# Patient Record
Sex: MALE | Race: WHITE | Employment: OTHER | URBAN - METROPOLITAN AREA
[De-identification: names, ages, dates, MRNs, and addresses within clinical notes are randomized per-mention and may not be internally consistent; named-entity substitution may affect disease eponyms.]

---

## 2017-01-16 ENCOUNTER — APPOINTMENT (OUTPATIENT)
Dept: GENERAL RADIOLOGY | Age: 77
DRG: 069 | End: 2017-01-16
Payer: MEDICARE

## 2017-01-16 ENCOUNTER — APPOINTMENT (OUTPATIENT)
Dept: ULTRASOUND IMAGING | Age: 77
DRG: 069 | End: 2017-01-16
Payer: MEDICARE

## 2017-01-16 ENCOUNTER — HOSPITAL ENCOUNTER (OUTPATIENT)
Age: 77
Setting detail: OBSERVATION
Discharge: HOME | DRG: 069 | End: 2017-01-18
Attending: STUDENT IN AN ORGANIZED HEALTH CARE EDUCATION/TRAINING PROGRAM | Admitting: INTERNAL MEDICINE
Payer: MEDICARE

## 2017-01-16 ENCOUNTER — APPOINTMENT (OUTPATIENT)
Dept: CT IMAGING | Age: 77
DRG: 069 | End: 2017-01-16
Payer: MEDICARE

## 2017-01-16 DIAGNOSIS — G45.9 TRANSIENT CEREBRAL ISCHEMIA, UNSPECIFIED TYPE: ICD-10-CM

## 2017-01-16 DIAGNOSIS — R41.0 DISORIENTATION: Primary | ICD-10-CM

## 2017-01-16 LAB
ALBUMIN SERPL-MCNC: 4.1 G/DL (ref 3.9–4.9)
ALP BLD-CCNC: 83 U/L (ref 35–104)
ALT SERPL-CCNC: 21 U/L (ref 0–41)
AMMONIA: 40 UMOL/L (ref 16–60)
ANION GAP SERPL CALCULATED.3IONS-SCNC: 13 MEQ/L (ref 7–13)
APTT: 25.7 SEC (ref 21.6–35.4)
AST SERPL-CCNC: 20 U/L (ref 0–40)
BACTERIA: ABNORMAL /HPF
BASOPHILS ABSOLUTE: 0 K/UL (ref 0–0.2)
BASOPHILS RELATIVE PERCENT: 0 %
BILIRUB SERPL-MCNC: 0.9 MG/DL (ref 0–1.2)
BILIRUBIN URINE: NEGATIVE
BLOOD, URINE: ABNORMAL
BUN BLDV-MCNC: 19 MG/DL (ref 8–23)
C-REACTIVE PROTEIN, HIGH SENSITIVITY: 12.6 MG/L (ref 0–5)
CALCIUM SERPL-MCNC: 8.8 MG/DL (ref 8.6–10.2)
CHLORIDE BLD-SCNC: 97 MEQ/L (ref 98–107)
CHOLESTEROL, TOTAL: 78 MG/DL (ref 0–199)
CHP ED QC CHECK: YES
CLARITY: CLEAR
CO2: 28 MEQ/L (ref 22–29)
COLOR: YELLOW
CREAT SERPL-MCNC: 1.16 MG/DL (ref 0.7–1.2)
EOSINOPHILS ABSOLUTE: 0.1 K/UL (ref 0–0.7)
EOSINOPHILS RELATIVE PERCENT: 1.2 %
EPITHELIAL CELLS, UA: ABNORMAL /HPF
GFR AFRICAN AMERICAN: >60
GFR NON-AFRICAN AMERICAN: >60
GLOBULIN: 2.3 G/DL (ref 2.3–3.5)
GLUCOSE BLD-MCNC: 105 MG/DL (ref 60–115)
GLUCOSE BLD-MCNC: 113 MG/DL (ref 60–115)
GLUCOSE BLD-MCNC: 117 MG/DL (ref 74–109)
GLUCOSE BLD-MCNC: 119 MG/DL
GLUCOSE URINE: NEGATIVE MG/DL
HCT VFR BLD CALC: 43.4 % (ref 42–52)
HDLC SERPL-MCNC: 29 MG/DL (ref 40–59)
HEMOGLOBIN: 14.5 G/DL (ref 14–18)
INR BLD: 1.1
KETONES, URINE: NEGATIVE MG/DL
LACTIC ACID: 1 MMOL/L (ref 0.5–2.2)
LDL CHOLESTEROL CALCULATED: 35 MG/DL (ref 0–129)
LEUKOCYTE ESTERASE, URINE: NEGATIVE
LYMPHOCYTES ABSOLUTE: 0.2 K/UL (ref 1–4.8)
LYMPHOCYTES RELATIVE PERCENT: 3.4 %
MAGNESIUM: 2.1 MG/DL (ref 1.7–2.3)
MCH RBC QN AUTO: 32.2 PG (ref 27–31.3)
MCHC RBC AUTO-ENTMCNC: 33.5 % (ref 33–37)
MCV RBC AUTO: 96 FL (ref 80–100)
MONOCYTES ABSOLUTE: 0.5 K/UL (ref 0.2–0.8)
MONOCYTES RELATIVE PERCENT: 6.7 %
NEUTROPHILS ABSOLUTE: 6.3 K/UL (ref 1.4–6.5)
NEUTROPHILS RELATIVE PERCENT: 88.7 %
NITRITE, URINE: NEGATIVE
PDW BLD-RTO: 14 % (ref 11.5–14.5)
PERFORMED ON: NORMAL
PERFORMED ON: NORMAL
PH UA: 6.5 (ref 5–9)
PLATELET # BLD: 144 K/UL (ref 130–400)
POTASSIUM SERPL-SCNC: 4.2 MEQ/L (ref 3.5–5.1)
PROTEIN UA: 30 MG/DL
PROTHROMBIN TIME: 11.6 SEC (ref 9.6–12.3)
RBC # BLD: 4.52 M/UL (ref 4.7–6.1)
RBC UA: ABNORMAL /HPF (ref 0–2)
SODIUM BLD-SCNC: 138 MEQ/L (ref 132–144)
SPECIFIC GRAVITY UA: 1.02 (ref 1–1.03)
TOTAL CK: 98 U/L (ref 0–190)
TOTAL PROTEIN: 6.4 G/DL (ref 6.4–8.1)
TRIGL SERPL-MCNC: 68 MG/DL (ref 0–200)
TROPONIN: <0.01 NG/ML (ref 0–0.01)
TSH SERPL DL<=0.05 MIU/L-ACNC: 1.09 UIU/ML (ref 0.27–4.2)
URINE REFLEX TO CULTURE: YES
UROBILINOGEN, URINE: 0.2 E.U./DL
WBC # BLD: 7.1 K/UL (ref 4.8–10.8)
WBC UA: ABNORMAL /HPF (ref 0–5)

## 2017-01-16 PROCEDURE — 1210000000 HC MED SURG R&B

## 2017-01-16 PROCEDURE — 80053 COMPREHEN METABOLIC PANEL: CPT

## 2017-01-16 PROCEDURE — 6370000000 HC RX 637 (ALT 250 FOR IP): Performed by: STUDENT IN AN ORGANIZED HEALTH CARE EDUCATION/TRAINING PROGRAM

## 2017-01-16 PROCEDURE — G0378 HOSPITAL OBSERVATION PER HR: HCPCS

## 2017-01-16 PROCEDURE — 86141 C-REACTIVE PROTEIN HS: CPT

## 2017-01-16 PROCEDURE — 81001 URINALYSIS AUTO W/SCOPE: CPT

## 2017-01-16 PROCEDURE — 71010 XR CHEST PORTABLE: CPT

## 2017-01-16 PROCEDURE — 36415 COLL VENOUS BLD VENIPUNCTURE: CPT

## 2017-01-16 PROCEDURE — 6360000002 HC RX W HCPCS: Performed by: INTERNAL MEDICINE

## 2017-01-16 PROCEDURE — 93005 ELECTROCARDIOGRAM TRACING: CPT

## 2017-01-16 PROCEDURE — 87086 URINE CULTURE/COLONY COUNT: CPT

## 2017-01-16 PROCEDURE — 83735 ASSAY OF MAGNESIUM: CPT

## 2017-01-16 PROCEDURE — 93880 EXTRACRANIAL BILAT STUDY: CPT

## 2017-01-16 PROCEDURE — 82140 ASSAY OF AMMONIA: CPT

## 2017-01-16 PROCEDURE — 6370000000 HC RX 637 (ALT 250 FOR IP): Performed by: INTERNAL MEDICINE

## 2017-01-16 PROCEDURE — 96372 THER/PROPH/DIAG INJ SC/IM: CPT

## 2017-01-16 PROCEDURE — 99285 EMERGENCY DEPT VISIT HI MDM: CPT

## 2017-01-16 PROCEDURE — 87040 BLOOD CULTURE FOR BACTERIA: CPT

## 2017-01-16 PROCEDURE — 70450 CT HEAD/BRAIN W/O DYE: CPT

## 2017-01-16 PROCEDURE — 84443 ASSAY THYROID STIM HORMONE: CPT

## 2017-01-16 PROCEDURE — 83605 ASSAY OF LACTIC ACID: CPT

## 2017-01-16 PROCEDURE — 82550 ASSAY OF CK (CPK): CPT

## 2017-01-16 PROCEDURE — 84484 ASSAY OF TROPONIN QUANT: CPT

## 2017-01-16 PROCEDURE — 80061 LIPID PANEL: CPT

## 2017-01-16 PROCEDURE — 85025 COMPLETE CBC W/AUTO DIFF WBC: CPT

## 2017-01-16 PROCEDURE — 85730 THROMBOPLASTIN TIME PARTIAL: CPT

## 2017-01-16 PROCEDURE — 85610 PROTHROMBIN TIME: CPT

## 2017-01-16 PROCEDURE — 2580000003 HC RX 258: Performed by: INTERNAL MEDICINE

## 2017-01-16 RX ORDER — ATORVASTATIN CALCIUM 10 MG/1
10 TABLET, FILM COATED ORAL DAILY
Status: DISCONTINUED | OUTPATIENT
Start: 2017-01-16 | End: 2017-01-18 | Stop reason: HOSPADM

## 2017-01-16 RX ORDER — DOCUSATE SODIUM 100 MG/1
100 CAPSULE, LIQUID FILLED ORAL 2 TIMES DAILY
Status: DISCONTINUED | OUTPATIENT
Start: 2017-01-16 | End: 2017-01-18 | Stop reason: HOSPADM

## 2017-01-16 RX ORDER — DONEPEZIL HYDROCHLORIDE 5 MG/1
10 TABLET, FILM COATED ORAL NIGHTLY
Status: DISCONTINUED | OUTPATIENT
Start: 2017-01-16 | End: 2017-01-18 | Stop reason: HOSPADM

## 2017-01-16 RX ORDER — MESALAMINE 400 MG/1
400 CAPSULE, DELAYED RELEASE ORAL 3 TIMES DAILY
Status: DISCONTINUED | OUTPATIENT
Start: 2017-01-16 | End: 2017-01-18 | Stop reason: HOSPADM

## 2017-01-16 RX ORDER — ASPIRIN 81 MG/1
324 TABLET, CHEWABLE ORAL ONCE
Status: COMPLETED | OUTPATIENT
Start: 2017-01-16 | End: 2017-01-16

## 2017-01-16 RX ORDER — SODIUM CHLORIDE 0.9 % (FLUSH) 0.9 %
10 SYRINGE (ML) INJECTION EVERY 12 HOURS SCHEDULED
Status: DISCONTINUED | OUTPATIENT
Start: 2017-01-16 | End: 2017-01-18 | Stop reason: HOSPADM

## 2017-01-16 RX ORDER — PROPRANOLOL HYDROCHLORIDE 40 MG/1
80 TABLET ORAL 2 TIMES DAILY
Status: DISCONTINUED | OUTPATIENT
Start: 2017-01-16 | End: 2017-01-18 | Stop reason: HOSPADM

## 2017-01-16 RX ORDER — SODIUM CHLORIDE 0.9 % (FLUSH) 0.9 %
10 SYRINGE (ML) INJECTION PRN
Status: DISCONTINUED | OUTPATIENT
Start: 2017-01-16 | End: 2017-01-18 | Stop reason: HOSPADM

## 2017-01-16 RX ORDER — AMLODIPINE BESYLATE AND BENAZEPRIL HYDROCHLORIDE 5; 20 MG/1; MG/1
1 CAPSULE ORAL 2 TIMES DAILY
Status: DISCONTINUED | OUTPATIENT
Start: 2017-01-16 | End: 2017-01-16 | Stop reason: CLARIF

## 2017-01-16 RX ORDER — ACETAMINOPHEN 325 MG/1
650 TABLET ORAL EVERY 4 HOURS PRN
Status: DISCONTINUED | OUTPATIENT
Start: 2017-01-16 | End: 2017-01-18 | Stop reason: HOSPADM

## 2017-01-16 RX ORDER — AMLODIPINE BESYLATE 5 MG/1
5 TABLET ORAL 2 TIMES DAILY
Status: DISCONTINUED | OUTPATIENT
Start: 2017-01-16 | End: 2017-01-18 | Stop reason: HOSPADM

## 2017-01-16 RX ORDER — CLONAZEPAM 0.5 MG/1
0.5 TABLET ORAL NIGHTLY PRN
Status: DISCONTINUED | OUTPATIENT
Start: 2017-01-16 | End: 2017-01-18 | Stop reason: HOSPADM

## 2017-01-16 RX ORDER — MEMANTINE HYDROCHLORIDE 5 MG/1
10 TABLET ORAL DAILY
Status: DISCONTINUED | OUTPATIENT
Start: 2017-01-16 | End: 2017-01-18 | Stop reason: HOSPADM

## 2017-01-16 RX ORDER — POTASSIUM CHLORIDE 20 MEQ/1
20 TABLET, EXTENDED RELEASE ORAL DAILY
Status: DISCONTINUED | OUTPATIENT
Start: 2017-01-16 | End: 2017-01-18 | Stop reason: HOSPADM

## 2017-01-16 RX ORDER — MULTIVITAMIN WITH FOLIC ACID 400 MCG
1 TABLET ORAL DAILY
Status: DISCONTINUED | OUTPATIENT
Start: 2017-01-16 | End: 2017-01-18 | Stop reason: HOSPADM

## 2017-01-16 RX ORDER — ASPIRIN 81 MG/1
81 TABLET ORAL DAILY
Status: DISCONTINUED | OUTPATIENT
Start: 2017-01-16 | End: 2017-01-18 | Stop reason: HOSPADM

## 2017-01-16 RX ORDER — LISINOPRIL 20 MG/1
20 TABLET ORAL 2 TIMES DAILY
Status: DISCONTINUED | OUTPATIENT
Start: 2017-01-16 | End: 2017-01-18 | Stop reason: HOSPADM

## 2017-01-16 RX ORDER — ALBUTEROL SULFATE 90 UG/1
2 AEROSOL, METERED RESPIRATORY (INHALATION) 4 TIMES DAILY
Status: DISCONTINUED | OUTPATIENT
Start: 2017-01-16 | End: 2017-01-17

## 2017-01-16 RX ORDER — OXYBUTYNIN CHLORIDE 5 MG/1
10 TABLET, EXTENDED RELEASE ORAL DAILY
Status: DISCONTINUED | OUTPATIENT
Start: 2017-01-16 | End: 2017-01-18 | Stop reason: HOSPADM

## 2017-01-16 RX ORDER — ONDANSETRON 2 MG/ML
4 INJECTION INTRAMUSCULAR; INTRAVENOUS EVERY 6 HOURS PRN
Status: DISCONTINUED | OUTPATIENT
Start: 2017-01-16 | End: 2017-01-18 | Stop reason: HOSPADM

## 2017-01-16 RX ORDER — MESALAMINE 1.2 G/1
1200 TABLET, DELAYED RELEASE ORAL
COMMUNITY
End: 2018-03-02 | Stop reason: ALTCHOICE

## 2017-01-16 RX ORDER — LOVASTATIN 20 MG/1
20 TABLET ORAL DAILY
Status: DISCONTINUED | OUTPATIENT
Start: 2017-01-16 | End: 2017-01-16 | Stop reason: CLARIF

## 2017-01-16 RX ORDER — BISACODYL 10 MG
10 SUPPOSITORY, RECTAL RECTAL DAILY PRN
Status: DISCONTINUED | OUTPATIENT
Start: 2017-01-16 | End: 2017-01-18 | Stop reason: HOSPADM

## 2017-01-16 RX ORDER — MESALAMINE 1.2 G/1
1.2 TABLET, DELAYED RELEASE ORAL
Status: DISCONTINUED | OUTPATIENT
Start: 2017-01-17 | End: 2017-01-16 | Stop reason: CLARIF

## 2017-01-16 RX ADMIN — DOCUSATE SODIUM 100 MG: 100 CAPSULE, LIQUID FILLED ORAL at 20:44

## 2017-01-16 RX ADMIN — ATORVASTATIN CALCIUM 10 MG: 10 TABLET, FILM COATED ORAL at 20:44

## 2017-01-16 RX ADMIN — DONEPEZIL HYDROCHLORIDE 10 MG: 5 TABLET, FILM COATED ORAL at 20:44

## 2017-01-16 RX ADMIN — ASPIRIN 81 MG 324 MG: 81 TABLET ORAL at 15:01

## 2017-01-16 RX ADMIN — PROPRANOLOL HYDROCHLORIDE 80 MG: 40 TABLET ORAL at 20:44

## 2017-01-16 RX ADMIN — LISINOPRIL 20 MG: 20 TABLET ORAL at 20:44

## 2017-01-16 RX ADMIN — MESALAMINE 400 MG: 400 CAPSULE, DELAYED RELEASE ORAL at 20:46

## 2017-01-16 RX ADMIN — AMLODIPINE BESYLATE 5 MG: 5 TABLET ORAL at 20:44

## 2017-01-16 RX ADMIN — ENOXAPARIN SODIUM 40 MG: 40 INJECTION SUBCUTANEOUS at 20:45

## 2017-01-16 RX ADMIN — Medication 10 ML: at 20:56

## 2017-01-16 ASSESSMENT — ENCOUNTER SYMPTOMS
ABDOMINAL PAIN: 0
COUGH: 1
DIARRHEA: 0
VOMITING: 0
SHORTNESS OF BREATH: 0
PHOTOPHOBIA: 0
NAUSEA: 0

## 2017-01-16 ASSESSMENT — PAIN SCALES - GENERAL
PAINLEVEL_OUTOF10: 0
PAINLEVEL_OUTOF10: 0

## 2017-01-17 ENCOUNTER — APPOINTMENT (OUTPATIENT)
Dept: NON INVASIVE DIAGNOSTICS | Age: 77
End: 2017-01-17
Payer: MEDICARE

## 2017-01-17 ENCOUNTER — HOSPITAL ENCOUNTER (OUTPATIENT)
Dept: MRI IMAGING | Age: 77
Discharge: HOME | End: 2017-01-17
Payer: MEDICARE

## 2017-01-17 ENCOUNTER — APPOINTMENT (OUTPATIENT)
Dept: MRI IMAGING | Age: 77
End: 2017-01-17
Payer: MEDICARE

## 2017-01-17 LAB
ANION GAP SERPL CALCULATED.3IONS-SCNC: 13 MEQ/L (ref 7–13)
BUN BLDV-MCNC: 18 MG/DL (ref 8–23)
CALCIUM SERPL-MCNC: 8.3 MG/DL (ref 8.6–10.2)
CHLORIDE BLD-SCNC: 97 MEQ/L (ref 98–107)
CO2: 27 MEQ/L (ref 22–29)
CREAT SERPL-MCNC: 0.95 MG/DL (ref 0.7–1.2)
GFR AFRICAN AMERICAN: >60
GFR NON-AFRICAN AMERICAN: >60
GLUCOSE BLD-MCNC: 99 MG/DL (ref 74–109)
HCT VFR BLD CALC: 37.6 % (ref 42–52)
HEMOGLOBIN: 12.6 G/DL (ref 14–18)
MCH RBC QN AUTO: 32.2 PG (ref 27–31.3)
MCHC RBC AUTO-ENTMCNC: 33.4 % (ref 33–37)
MCV RBC AUTO: 96.5 FL (ref 80–100)
PDW BLD-RTO: 14.6 % (ref 11.5–14.5)
PLATELET # BLD: 120 K/UL (ref 130–400)
POTASSIUM SERPL-SCNC: 3.6 MEQ/L (ref 3.5–5.1)
RBC # BLD: 3.9 M/UL (ref 4.7–6.1)
SODIUM BLD-SCNC: 137 MEQ/L (ref 132–144)
WBC # BLD: 5.9 K/UL (ref 4.8–10.8)

## 2017-01-17 PROCEDURE — 6370000000 HC RX 637 (ALT 250 FOR IP): Performed by: INTERNAL MEDICINE

## 2017-01-17 PROCEDURE — 97166 OT EVAL MOD COMPLEX 45 MIN: CPT

## 2017-01-17 PROCEDURE — 93306 TTE W/DOPPLER COMPLETE: CPT

## 2017-01-17 PROCEDURE — 2580000003 HC RX 258: Performed by: INTERNAL MEDICINE

## 2017-01-17 PROCEDURE — 6360000004 HC RX CONTRAST MEDICATION: Performed by: RADIOLOGY

## 2017-01-17 PROCEDURE — G8987 SELF CARE CURRENT STATUS: HCPCS

## 2017-01-17 PROCEDURE — 94761 N-INVAS EAR/PLS OXIMETRY MLT: CPT

## 2017-01-17 PROCEDURE — 70553 MRI BRAIN STEM W/O & W/DYE: CPT

## 2017-01-17 PROCEDURE — 1210000000 HC MED SURG R&B

## 2017-01-17 PROCEDURE — 6370000000 HC RX 637 (ALT 250 FOR IP): Performed by: STUDENT IN AN ORGANIZED HEALTH CARE EDUCATION/TRAINING PROGRAM

## 2017-01-17 PROCEDURE — G8988 SELF CARE GOAL STATUS: HCPCS

## 2017-01-17 PROCEDURE — 6360000002 HC RX W HCPCS: Performed by: INTERNAL MEDICINE

## 2017-01-17 PROCEDURE — 80048 BASIC METABOLIC PNL TOTAL CA: CPT

## 2017-01-17 PROCEDURE — A9579 GAD-BASE MR CONTRAST NOS,1ML: HCPCS | Performed by: RADIOLOGY

## 2017-01-17 PROCEDURE — 85027 COMPLETE CBC AUTOMATED: CPT

## 2017-01-17 PROCEDURE — 36415 COLL VENOUS BLD VENIPUNCTURE: CPT

## 2017-01-17 PROCEDURE — G0378 HOSPITAL OBSERVATION PER HR: HCPCS

## 2017-01-17 PROCEDURE — 70544 MR ANGIOGRAPHY HEAD W/O DYE: CPT

## 2017-01-17 RX ORDER — TIZANIDINE 2 MG/1
4 TABLET ORAL NIGHTLY
Status: DISCONTINUED | OUTPATIENT
Start: 2017-01-17 | End: 2017-01-18 | Stop reason: HOSPADM

## 2017-01-17 RX ORDER — LORAZEPAM 1 MG/1
1 TABLET ORAL ONCE
Status: COMPLETED | OUTPATIENT
Start: 2017-01-17 | End: 2017-01-17

## 2017-01-17 RX ORDER — ALBUTEROL SULFATE 90 UG/1
2 AEROSOL, METERED RESPIRATORY (INHALATION) 4 TIMES DAILY
Status: DISCONTINUED | OUTPATIENT
Start: 2017-01-18 | End: 2017-01-18 | Stop reason: HOSPADM

## 2017-01-17 RX ORDER — SODIUM CHLORIDE 0.9 % (FLUSH) 0.9 %
10 SYRINGE (ML) INJECTION 2 TIMES DAILY
Status: DISCONTINUED | OUTPATIENT
Start: 2017-01-17 | End: 2017-01-20 | Stop reason: HOSPADM

## 2017-01-17 RX ORDER — PHENOBARBITAL SODIUM 65 MG/ML
130 INJECTION INTRAMUSCULAR ONCE
Status: DISCONTINUED | OUTPATIENT
Start: 2017-01-17 | End: 2017-01-17 | Stop reason: ALTCHOICE

## 2017-01-17 RX ORDER — PRIMIDONE 50 MG/1
50 TABLET ORAL 2 TIMES DAILY
Status: DISCONTINUED | OUTPATIENT
Start: 2017-01-17 | End: 2017-01-18 | Stop reason: HOSPADM

## 2017-01-17 RX ADMIN — POTASSIUM CHLORIDE 20 MEQ: 1500 TABLET, EXTENDED RELEASE ORAL at 08:44

## 2017-01-17 RX ADMIN — DONEPEZIL HYDROCHLORIDE 10 MG: 5 TABLET, FILM COATED ORAL at 22:33

## 2017-01-17 RX ADMIN — ASPIRIN 81 MG: 81 TABLET ORAL at 08:44

## 2017-01-17 RX ADMIN — MESALAMINE 400 MG: 400 CAPSULE, DELAYED RELEASE ORAL at 22:32

## 2017-01-17 RX ADMIN — OXYBUTYNIN CHLORIDE 10 MG: 5 TABLET, FILM COATED, EXTENDED RELEASE ORAL at 08:44

## 2017-01-17 RX ADMIN — PROPRANOLOL HYDROCHLORIDE 80 MG: 40 TABLET ORAL at 22:40

## 2017-01-17 RX ADMIN — LORAZEPAM 1 MG: 1 TABLET ORAL at 09:53

## 2017-01-17 RX ADMIN — LISINOPRIL 20 MG: 20 TABLET ORAL at 08:44

## 2017-01-17 RX ADMIN — MEMANTINE 10 MG: 5 TABLET ORAL at 08:44

## 2017-01-17 RX ADMIN — MESALAMINE 400 MG: 400 CAPSULE, DELAYED RELEASE ORAL at 08:44

## 2017-01-17 RX ADMIN — AMLODIPINE BESYLATE 5 MG: 5 TABLET ORAL at 08:44

## 2017-01-17 RX ADMIN — PROPRANOLOL HYDROCHLORIDE 80 MG: 40 TABLET ORAL at 08:45

## 2017-01-17 RX ADMIN — DOCUSATE SODIUM 100 MG: 100 CAPSULE, LIQUID FILLED ORAL at 22:33

## 2017-01-17 RX ADMIN — LISINOPRIL 20 MG: 20 TABLET ORAL at 22:33

## 2017-01-17 RX ADMIN — GADOVERSETAMIDE 15 ML: 0.5 INJECTION, SOLUTION INTRAVENOUS at 13:00

## 2017-01-17 RX ADMIN — DOCUSATE SODIUM 100 MG: 100 CAPSULE, LIQUID FILLED ORAL at 08:44

## 2017-01-17 RX ADMIN — Medication 10 ML: at 22:35

## 2017-01-17 RX ADMIN — MULTIVITAMIN TABLET 1 TABLET: TABLET at 08:44

## 2017-01-17 RX ADMIN — ATORVASTATIN CALCIUM 10 MG: 10 TABLET, FILM COATED ORAL at 08:44

## 2017-01-17 RX ADMIN — AMLODIPINE BESYLATE 5 MG: 5 TABLET ORAL at 22:33

## 2017-01-17 ASSESSMENT — PAIN SCALES - GENERAL: PAINLEVEL_OUTOF10: 5

## 2017-01-17 ASSESSMENT — PAIN DESCRIPTION - PAIN TYPE: TYPE: CHRONIC PAIN

## 2017-01-17 ASSESSMENT — PAIN DESCRIPTION - ORIENTATION: ORIENTATION: RIGHT

## 2017-01-17 ASSESSMENT — PAIN DESCRIPTION - LOCATION: LOCATION: ARM

## 2017-01-18 VITALS
BODY MASS INDEX: 27.62 KG/M2 | TEMPERATURE: 98.7 F | RESPIRATION RATE: 18 BRPM | WEIGHT: 176 LBS | HEART RATE: 62 BPM | SYSTOLIC BLOOD PRESSURE: 127 MMHG | HEIGHT: 67 IN | OXYGEN SATURATION: 96 % | DIASTOLIC BLOOD PRESSURE: 70 MMHG

## 2017-01-18 LAB
ALBUMIN SERPL-MCNC: 3.5 G/DL (ref 3.9–4.9)
ALP BLD-CCNC: 67 U/L (ref 35–104)
ALT SERPL-CCNC: 15 U/L (ref 0–41)
AMMONIA: 46 UMOL/L (ref 16–60)
AST SERPL-CCNC: 15 U/L (ref 0–40)
BILIRUB SERPL-MCNC: 1 MG/DL (ref 0–1.2)
BILIRUBIN DIRECT: 0.2 MG/DL (ref 0–0.3)
BILIRUBIN, INDIRECT: 0.8 MG/DL (ref 0–0.6)
C-REACTIVE PROTEIN, HIGH SENSITIVITY: 106.7 MG/L (ref 0–5)
FOLATE: 13.7 NG/ML (ref 7.3–26.1)
SEDIMENTATION RATE, ERYTHROCYTE: 20 MM (ref 0–20)
TOTAL PROTEIN: 5.4 G/DL (ref 6.4–8.1)
TSH SERPL DL<=0.05 MIU/L-ACNC: 3.06 UIU/ML (ref 0.27–4.2)
URINE CULTURE, ROUTINE: NORMAL
VITAMIN B-12: 345 PG/ML (ref 211–946)

## 2017-01-18 PROCEDURE — 82746 ASSAY OF FOLIC ACID SERUM: CPT

## 2017-01-18 PROCEDURE — 97530 THERAPEUTIC ACTIVITIES: CPT

## 2017-01-18 PROCEDURE — 86780 TREPONEMA PALLIDUM: CPT

## 2017-01-18 PROCEDURE — G0378 HOSPITAL OBSERVATION PER HR: HCPCS

## 2017-01-18 PROCEDURE — 86618 LYME DISEASE ANTIBODY: CPT

## 2017-01-18 PROCEDURE — 96372 THER/PROPH/DIAG INJ SC/IM: CPT

## 2017-01-18 PROCEDURE — G8979 MOBILITY GOAL STATUS: HCPCS

## 2017-01-18 PROCEDURE — 86141 C-REACTIVE PROTEIN HS: CPT

## 2017-01-18 PROCEDURE — 83921 ORGANIC ACID SINGLE QUANT: CPT

## 2017-01-18 PROCEDURE — 6370000000 HC RX 637 (ALT 250 FOR IP): Performed by: STUDENT IN AN ORGANIZED HEALTH CARE EDUCATION/TRAINING PROGRAM

## 2017-01-18 PROCEDURE — 85652 RBC SED RATE AUTOMATED: CPT

## 2017-01-18 PROCEDURE — 82140 ASSAY OF AMMONIA: CPT

## 2017-01-18 PROCEDURE — 6370000000 HC RX 637 (ALT 250 FOR IP): Performed by: INTERNAL MEDICINE

## 2017-01-18 PROCEDURE — G8978 MOBILITY CURRENT STATUS: HCPCS

## 2017-01-18 PROCEDURE — 84443 ASSAY THYROID STIM HORMONE: CPT

## 2017-01-18 PROCEDURE — 2580000003 HC RX 258: Performed by: PSYCHIATRY & NEUROLOGY

## 2017-01-18 PROCEDURE — 6360000002 HC RX W HCPCS: Performed by: INTERNAL MEDICINE

## 2017-01-18 PROCEDURE — 6360000002 HC RX W HCPCS: Performed by: PSYCHIATRY & NEUROLOGY

## 2017-01-18 PROCEDURE — 82607 VITAMIN B-12: CPT

## 2017-01-18 PROCEDURE — 2580000003 HC RX 258: Performed by: INTERNAL MEDICINE

## 2017-01-18 PROCEDURE — 97162 PT EVAL MOD COMPLEX 30 MIN: CPT

## 2017-01-18 PROCEDURE — 6370000000 HC RX 637 (ALT 250 FOR IP): Performed by: PSYCHIATRY & NEUROLOGY

## 2017-01-18 PROCEDURE — 80076 HEPATIC FUNCTION PANEL: CPT

## 2017-01-18 PROCEDURE — 36415 COLL VENOUS BLD VENIPUNCTURE: CPT

## 2017-01-18 RX ORDER — ACETAMINOPHEN 325 MG/1
650 TABLET ORAL EVERY 4 HOURS PRN
Status: CANCELLED | OUTPATIENT
Start: 2017-01-18

## 2017-01-18 RX ORDER — ONDANSETRON 2 MG/ML
4 INJECTION INTRAMUSCULAR; INTRAVENOUS EVERY 6 HOURS PRN
Status: CANCELLED | OUTPATIENT
Start: 2017-01-18

## 2017-01-18 RX ORDER — ATORVASTATIN CALCIUM 10 MG/1
10 TABLET, FILM COATED ORAL DAILY
Status: CANCELLED | OUTPATIENT
Start: 2017-01-19

## 2017-01-18 RX ORDER — SODIUM CHLORIDE 0.9 % (FLUSH) 0.9 %
10 SYRINGE (ML) INJECTION EVERY 12 HOURS SCHEDULED
Status: CANCELLED | OUTPATIENT
Start: 2017-01-18

## 2017-01-18 RX ORDER — MEMANTINE HYDROCHLORIDE 5 MG/1
10 TABLET ORAL DAILY
Status: CANCELLED | OUTPATIENT
Start: 2017-01-19

## 2017-01-18 RX ORDER — CLONAZEPAM 0.5 MG/1
0.5 TABLET ORAL NIGHTLY PRN
Status: CANCELLED | OUTPATIENT
Start: 2017-01-18

## 2017-01-18 RX ORDER — OXYBUTYNIN CHLORIDE 5 MG/1
10 TABLET, EXTENDED RELEASE ORAL DAILY
Status: CANCELLED | OUTPATIENT
Start: 2017-01-19

## 2017-01-18 RX ORDER — DOCUSATE SODIUM 100 MG/1
100 CAPSULE, LIQUID FILLED ORAL 2 TIMES DAILY
Status: CANCELLED | OUTPATIENT
Start: 2017-01-18

## 2017-01-18 RX ORDER — MESALAMINE 400 MG/1
400 CAPSULE, DELAYED RELEASE ORAL 3 TIMES DAILY
Status: CANCELLED | OUTPATIENT
Start: 2017-01-18

## 2017-01-18 RX ORDER — ALBUTEROL SULFATE 90 UG/1
2 AEROSOL, METERED RESPIRATORY (INHALATION) 4 TIMES DAILY
Status: CANCELLED | OUTPATIENT
Start: 2017-01-18

## 2017-01-18 RX ORDER — SODIUM CHLORIDE 0.9 % (FLUSH) 0.9 %
10 SYRINGE (ML) INJECTION PRN
Status: CANCELLED | OUTPATIENT
Start: 2017-01-18

## 2017-01-18 RX ORDER — MULTIVITAMIN WITH FOLIC ACID 400 MCG
1 TABLET ORAL DAILY
Status: CANCELLED | OUTPATIENT
Start: 2017-01-19

## 2017-01-18 RX ORDER — DONEPEZIL HYDROCHLORIDE 5 MG/1
10 TABLET, FILM COATED ORAL NIGHTLY
Status: CANCELLED | OUTPATIENT
Start: 2017-01-18

## 2017-01-18 RX ORDER — PROPRANOLOL HYDROCHLORIDE 40 MG/1
80 TABLET ORAL 2 TIMES DAILY
Status: CANCELLED | OUTPATIENT
Start: 2017-01-18

## 2017-01-18 RX ORDER — ASPIRIN 81 MG/1
81 TABLET ORAL DAILY
Status: CANCELLED | OUTPATIENT
Start: 2017-01-19

## 2017-01-18 RX ORDER — PRIMIDONE 50 MG/1
50 TABLET ORAL 2 TIMES DAILY
Qty: 90 TABLET | Refills: 3 | Status: SHIPPED | OUTPATIENT
Start: 2017-01-18 | End: 2021-01-12 | Stop reason: SDUPTHER

## 2017-01-18 RX ORDER — AMLODIPINE BESYLATE 5 MG/1
5 TABLET ORAL 2 TIMES DAILY
Status: CANCELLED | OUTPATIENT
Start: 2017-01-18

## 2017-01-18 RX ORDER — TIZANIDINE 2 MG/1
4 TABLET ORAL NIGHTLY
Status: CANCELLED | OUTPATIENT
Start: 2017-01-18

## 2017-01-18 RX ORDER — BISACODYL 10 MG
10 SUPPOSITORY, RECTAL RECTAL DAILY PRN
Status: CANCELLED | OUTPATIENT
Start: 2017-01-18

## 2017-01-18 RX ORDER — PRIMIDONE 50 MG/1
50 TABLET ORAL 2 TIMES DAILY
Status: CANCELLED | OUTPATIENT
Start: 2017-01-18

## 2017-01-18 RX ORDER — LISINOPRIL 20 MG/1
20 TABLET ORAL 2 TIMES DAILY
Status: CANCELLED | OUTPATIENT
Start: 2017-01-18

## 2017-01-18 RX ORDER — POTASSIUM CHLORIDE 20 MEQ/1
20 TABLET, EXTENDED RELEASE ORAL DAILY
Status: CANCELLED | OUTPATIENT
Start: 2017-01-19

## 2017-01-18 RX ADMIN — LISINOPRIL 20 MG: 20 TABLET ORAL at 08:55

## 2017-01-18 RX ADMIN — PHENOBARBITAL SODIUM 130 MG: 65 INJECTION INTRAMUSCULAR; INTRAVENOUS at 00:38

## 2017-01-18 RX ADMIN — MULTIVITAMIN TABLET 1 TABLET: TABLET at 08:55

## 2017-01-18 RX ADMIN — PRIMIDONE 50 MG: 50 TABLET ORAL at 08:54

## 2017-01-18 RX ADMIN — ASPIRIN 81 MG: 81 TABLET ORAL at 08:54

## 2017-01-18 RX ADMIN — MEMANTINE 10 MG: 5 TABLET ORAL at 08:55

## 2017-01-18 RX ADMIN — OXYBUTYNIN CHLORIDE 10 MG: 5 TABLET, FILM COATED, EXTENDED RELEASE ORAL at 08:54

## 2017-01-18 RX ADMIN — ENOXAPARIN SODIUM 40 MG: 40 INJECTION SUBCUTANEOUS at 08:56

## 2017-01-18 RX ADMIN — POTASSIUM CHLORIDE 20 MEQ: 1500 TABLET, EXTENDED RELEASE ORAL at 08:55

## 2017-01-18 RX ADMIN — Medication 10 ML: at 09:00

## 2017-01-18 RX ADMIN — MESALAMINE 400 MG: 400 CAPSULE, DELAYED RELEASE ORAL at 08:55

## 2017-01-18 RX ADMIN — AMLODIPINE BESYLATE 5 MG: 5 TABLET ORAL at 08:54

## 2017-01-18 RX ADMIN — PROPRANOLOL HYDROCHLORIDE 80 MG: 40 TABLET ORAL at 08:54

## 2017-01-18 RX ADMIN — DOCUSATE SODIUM 100 MG: 100 CAPSULE, LIQUID FILLED ORAL at 08:55

## 2017-01-18 RX ADMIN — PRIMIDONE 50 MG: 50 TABLET ORAL at 00:37

## 2017-01-18 ASSESSMENT — PAIN SCALES - GENERAL
PAINLEVEL_OUTOF10: 0

## 2017-01-19 LAB — LYME, EIA: 0.12 LIV (ref 0–1.2)

## 2017-01-20 LAB
METHYLMALONIC ACID: 0.14 UMOL/L (ref 0–0.4)
RPR: NON REACTIVE

## 2017-01-21 LAB
BLOOD CULTURE, ROUTINE: NORMAL
CULTURE, BLOOD 2: NORMAL

## 2017-01-26 LAB
EKG ATRIAL RATE: 59 BPM
EKG P AXIS: 44 DEGREES
EKG P-R INTERVAL: 156 MS
EKG Q-T INTERVAL: 424 MS
EKG QRS DURATION: 88 MS
EKG QTC CALCULATION (BAZETT): 419 MS
EKG R AXIS: 20 DEGREES
EKG T AXIS: 41 DEGREES
EKG VENTRICULAR RATE: 59 BPM

## 2017-02-07 ENCOUNTER — HOSPITAL ENCOUNTER (OUTPATIENT)
Dept: LAB | Age: 77
Discharge: HOME | End: 2017-02-07
Payer: MEDICARE

## 2017-02-07 LAB
ANION GAP SERPL CALCULATED.3IONS-SCNC: 11 MEQ/L (ref 7–13)
BUN BLDV-MCNC: 25 MG/DL (ref 8–23)
CALCIUM SERPL-MCNC: 8.4 MG/DL (ref 8.6–10.2)
CHLORIDE BLD-SCNC: 95 MEQ/L (ref 98–107)
CO2: 35 MEQ/L (ref 22–29)
CREAT SERPL-MCNC: 1.58 MG/DL (ref 0.7–1.2)
GFR AFRICAN AMERICAN: 51.5
GFR NON-AFRICAN AMERICAN: 42.5
GLUCOSE BLD-MCNC: 102 MG/DL (ref 74–109)
POTASSIUM SERPL-SCNC: 3.5 MEQ/L (ref 3.5–5.1)
SODIUM BLD-SCNC: 141 MEQ/L (ref 132–144)

## 2017-02-07 PROCEDURE — 80048 BASIC METABOLIC PNL TOTAL CA: CPT

## 2017-02-07 PROCEDURE — 36415 COLL VENOUS BLD VENIPUNCTURE: CPT

## 2017-02-16 ENCOUNTER — HOSPITAL ENCOUNTER (OUTPATIENT)
Dept: LAB | Age: 77
Discharge: HOME | End: 2017-02-16
Payer: MEDICARE

## 2017-02-16 LAB
ALBUMIN SERPL-MCNC: 3.5 G/DL (ref 3.9–4.9)
ALP BLD-CCNC: 111 U/L (ref 35–104)
ALT SERPL-CCNC: 16 U/L (ref 0–41)
ANION GAP SERPL CALCULATED.3IONS-SCNC: 12 MEQ/L (ref 7–13)
AST SERPL-CCNC: 19 U/L (ref 0–40)
BILIRUB SERPL-MCNC: 0.3 MG/DL (ref 0–1.2)
BUN BLDV-MCNC: 29 MG/DL (ref 8–23)
CALCIUM SERPL-MCNC: 8.4 MG/DL (ref 8.6–10.2)
CHLORIDE BLD-SCNC: 99 MEQ/L (ref 98–107)
CO2: 28 MEQ/L (ref 22–29)
CREAT SERPL-MCNC: 1.24 MG/DL (ref 0.7–1.2)
GFR AFRICAN AMERICAN: >60
GFR NON-AFRICAN AMERICAN: 56.2
GLOBULIN: 2.2 G/DL (ref 2.3–3.5)
GLUCOSE BLD-MCNC: 82 MG/DL (ref 74–109)
POTASSIUM SERPL-SCNC: 4.1 MEQ/L (ref 3.5–5.1)
SODIUM BLD-SCNC: 139 MEQ/L (ref 132–144)
TOTAL PROTEIN: 5.7 G/DL (ref 6.4–8.1)

## 2017-02-16 PROCEDURE — 36415 COLL VENOUS BLD VENIPUNCTURE: CPT

## 2017-02-16 PROCEDURE — 80053 COMPREHEN METABOLIC PANEL: CPT

## 2017-04-11 ENCOUNTER — HOSPITAL ENCOUNTER (OUTPATIENT)
Dept: LAB | Age: 77
Discharge: HOME | End: 2017-04-11
Payer: MEDICARE

## 2017-04-11 PROCEDURE — 87086 URINE CULTURE/COLONY COUNT: CPT

## 2017-04-11 PROCEDURE — 88112 CYTOPATH CELL ENHANCE TECH: CPT

## 2017-04-13 LAB — URINE CULTURE, ROUTINE: NORMAL

## 2017-06-06 ENCOUNTER — OFFICE VISIT (OUTPATIENT)
Dept: FAMILY MEDICINE CLINIC | Age: 77
End: 2017-06-06

## 2017-06-06 ENCOUNTER — HOSPITAL ENCOUNTER (OUTPATIENT)
Age: 77
Setting detail: SPECIMEN
Discharge: HOME | End: 2017-06-06
Payer: MEDICARE

## 2017-06-06 VITALS
BODY MASS INDEX: 28.09 KG/M2 | SYSTOLIC BLOOD PRESSURE: 130 MMHG | DIASTOLIC BLOOD PRESSURE: 82 MMHG | HEART RATE: 50 BPM | RESPIRATION RATE: 18 BRPM | TEMPERATURE: 98.1 F | OXYGEN SATURATION: 98 % | WEIGHT: 179 LBS | HEIGHT: 67 IN

## 2017-06-06 DIAGNOSIS — N13.8 BPH WITH OBSTRUCTION/LOWER URINARY TRACT SYMPTOMS: Chronic | ICD-10-CM

## 2017-06-06 DIAGNOSIS — R30.0 DYSURIA: ICD-10-CM

## 2017-06-06 DIAGNOSIS — R39.15 URINARY URGENCY: ICD-10-CM

## 2017-06-06 DIAGNOSIS — I25.10 CORONARY ARTERY DISEASE INVOLVING NATIVE CORONARY ARTERY OF NATIVE HEART WITHOUT ANGINA PECTORIS: Chronic | ICD-10-CM

## 2017-06-06 DIAGNOSIS — R35.0 URINARY FREQUENCY: ICD-10-CM

## 2017-06-06 DIAGNOSIS — R30.0 DYSURIA: Primary | ICD-10-CM

## 2017-06-06 DIAGNOSIS — I10 ESSENTIAL HYPERTENSION: Chronic | ICD-10-CM

## 2017-06-06 DIAGNOSIS — Z23 NEED FOR VACCINATION: ICD-10-CM

## 2017-06-06 DIAGNOSIS — K57.30 DIVERTICULOSIS OF LARGE INTESTINE WITHOUT HEMORRHAGE: Chronic | ICD-10-CM

## 2017-06-06 DIAGNOSIS — G25.0 BENIGN HEAD TREMOR: Chronic | ICD-10-CM

## 2017-06-06 DIAGNOSIS — I50.9 CONGESTIVE HEART FAILURE, UNSPECIFIED: ICD-10-CM

## 2017-06-06 DIAGNOSIS — N32.81 OVERACTIVE BLADDER: Chronic | ICD-10-CM

## 2017-06-06 DIAGNOSIS — K51.90 ULCERATIVE COLITIS WITHOUT COMPLICATIONS, UNSPECIFIED LOCATION (HCC): Chronic | ICD-10-CM

## 2017-06-06 DIAGNOSIS — K51.90 ULCERATIVE COLITIS, UNSPECIFIED: ICD-10-CM

## 2017-06-06 DIAGNOSIS — N40.1 BPH WITH OBSTRUCTION/LOWER URINARY TRACT SYMPTOMS: Chronic | ICD-10-CM

## 2017-06-06 DIAGNOSIS — E78.5 HYPERLIPIDEMIA, UNSPECIFIED HYPERLIPIDEMIA TYPE: Chronic | ICD-10-CM

## 2017-06-06 LAB
BACTERIA: ABNORMAL /HPF
BILIRUBIN, POC: NORMAL
BLOOD URINE, POC: NORMAL
CLARITY, POC: CLEAR
COLOR, POC: YELLOW
EPITHELIAL CELLS, UA: ABNORMAL /HPF
GLUCOSE URINE, POC: NORMAL
KETONES, POC: NORMAL
LEUKOCYTE EST, POC: NORMAL
MUCUS: PRESENT
NITRITE, POC: NORMAL
PH, POC: 6.5
PROTEIN, POC: NORMAL
RBC UA: ABNORMAL /HPF (ref 0–2)
SPECIFIC GRAVITY, POC: 1.02
UROBILINOGEN, POC: NORMAL
WBC UA: ABNORMAL /HPF (ref 0–5)

## 2017-06-06 PROCEDURE — 81002 URINALYSIS NONAUTO W/O SCOPE: CPT | Performed by: FAMILY MEDICINE

## 2017-06-06 PROCEDURE — 3288F FALL RISK ASSESSMENT DOCD: CPT | Performed by: FAMILY MEDICINE

## 2017-06-06 PROCEDURE — 99214 OFFICE O/P EST MOD 30 MIN: CPT | Performed by: FAMILY MEDICINE

## 2017-06-06 PROCEDURE — 87086 URINE CULTURE/COLONY COUNT: CPT

## 2017-06-06 PROCEDURE — G8510 SCR DEP NEG, NO PLAN REQD: HCPCS | Performed by: FAMILY MEDICINE

## 2017-06-06 PROCEDURE — 81015 MICROSCOPIC EXAM OF URINE: CPT

## 2017-06-06 RX ORDER — TORSEMIDE 20 MG/1
20 TABLET ORAL DAILY
Refills: 1 | COMMUNITY
Start: 2017-04-03

## 2017-06-06 RX ORDER — CIPROFLOXACIN 500 MG/1
500 TABLET, FILM COATED ORAL 2 TIMES DAILY
Qty: 28 TABLET | Refills: 0 | Status: SHIPPED | OUTPATIENT
Start: 2017-06-06 | End: 2017-06-20

## 2017-06-06 RX ORDER — ACETAMINOPHEN 325 MG/1
650 TABLET ORAL EVERY 6 HOURS PRN
COMMUNITY
End: 2020-01-02

## 2017-06-06 RX ORDER — ATORVASTATIN CALCIUM 10 MG/1
10 TABLET, FILM COATED ORAL
COMMUNITY
End: 2017-09-28 | Stop reason: SDUPTHER

## 2017-06-06 RX ORDER — TIZANIDINE 4 MG/1
TABLET ORAL
Refills: 0 | COMMUNITY
Start: 2017-05-09 | End: 2017-09-28 | Stop reason: ALTCHOICE

## 2017-06-06 RX ORDER — TRAMADOL HYDROCHLORIDE 50 MG/1
50 TABLET ORAL
COMMUNITY
Start: 2016-11-09 | End: 2018-07-02 | Stop reason: ALTCHOICE

## 2017-06-06 RX ORDER — APIXABAN 5 MG/1
TABLET, FILM COATED ORAL
Refills: 0 | COMMUNITY
Start: 2017-05-30 | End: 2017-11-16

## 2017-06-06 RX ORDER — OMEPRAZOLE 40 MG/1
CAPSULE, DELAYED RELEASE ORAL
COMMUNITY
Start: 2017-05-24 | End: 2017-09-28 | Stop reason: ALTCHOICE

## 2017-06-06 RX ORDER — THIAMINE HCL 100 MG
TABLET ORAL
COMMUNITY
End: 2019-03-07 | Stop reason: DRUGHIGH

## 2017-06-06 RX ORDER — PROPRANOLOL HYDROCHLORIDE 120 MG/1
CAPSULE, EXTENDED RELEASE ORAL
COMMUNITY
Start: 2017-03-23 | End: 2017-12-20 | Stop reason: SDUPTHER

## 2017-06-06 ASSESSMENT — ENCOUNTER SYMPTOMS
NAUSEA: 0
VOMITING: 0
COUGH: 0
SHORTNESS OF BREATH: 0
ABDOMINAL PAIN: 0
DIARRHEA: 0
BLOOD IN STOOL: 0
CHEST TIGHTNESS: 0
WHEEZING: 0

## 2017-06-06 ASSESSMENT — PATIENT HEALTH QUESTIONNAIRE - PHQ9
2. FEELING DOWN, DEPRESSED OR HOPELESS: 0
SUM OF ALL RESPONSES TO PHQ9 QUESTIONS 1 & 2: 0
SUM OF ALL RESPONSES TO PHQ QUESTIONS 1-9: 0
1. LITTLE INTEREST OR PLEASURE IN DOING THINGS: 0

## 2017-06-08 LAB — URINE CULTURE, ROUTINE: NORMAL

## 2017-06-09 ENCOUNTER — HOSPITAL ENCOUNTER (OUTPATIENT)
Dept: LAB | Age: 77
Discharge: HOME | End: 2017-06-09
Payer: MEDICARE

## 2017-06-09 DIAGNOSIS — E78.5 HYPERLIPIDEMIA, UNSPECIFIED HYPERLIPIDEMIA TYPE: Chronic | ICD-10-CM

## 2017-06-09 DIAGNOSIS — I10 ESSENTIAL HYPERTENSION: Chronic | ICD-10-CM

## 2017-06-09 LAB
ALBUMIN SERPL-MCNC: 4 G/DL (ref 3.9–4.9)
ALP BLD-CCNC: 89 U/L (ref 35–104)
ALT SERPL-CCNC: 17 U/L (ref 0–41)
ANION GAP SERPL CALCULATED.3IONS-SCNC: 13 MEQ/L (ref 7–13)
AST SERPL-CCNC: 23 U/L (ref 0–40)
BASOPHILS ABSOLUTE: 0.1 K/UL (ref 0–0.2)
BASOPHILS RELATIVE PERCENT: 1 %
BILIRUB SERPL-MCNC: 0.7 MG/DL (ref 0–1.2)
BUN BLDV-MCNC: 30 MG/DL (ref 8–23)
CALCIUM SERPL-MCNC: 8.7 MG/DL (ref 8.6–10.2)
CHLORIDE BLD-SCNC: 102 MEQ/L (ref 98–107)
CHOLESTEROL, TOTAL: 133 MG/DL (ref 0–199)
CO2: 26 MEQ/L (ref 22–29)
CREAT SERPL-MCNC: 1.23 MG/DL (ref 0.7–1.2)
EOSINOPHILS ABSOLUTE: 0.2 K/UL (ref 0–0.7)
EOSINOPHILS RELATIVE PERCENT: 4.4 %
GFR AFRICAN AMERICAN: >60
GFR NON-AFRICAN AMERICAN: 56.7
GLOBULIN: 2.2 G/DL (ref 2.3–3.5)
GLUCOSE BLD-MCNC: 91 MG/DL (ref 74–109)
HCT VFR BLD CALC: 39.1 % (ref 42–52)
HDLC SERPL-MCNC: 47 MG/DL (ref 40–59)
HEMOGLOBIN: 13 G/DL (ref 14–18)
LDL CHOLESTEROL CALCULATED: 66 MG/DL (ref 0–129)
LYMPHOCYTES ABSOLUTE: 0.7 K/UL (ref 1–4.8)
LYMPHOCYTES RELATIVE PERCENT: 12.6 %
MCH RBC QN AUTO: 32.3 PG (ref 27–31.3)
MCHC RBC AUTO-ENTMCNC: 33.3 % (ref 33–37)
MCV RBC AUTO: 97 FL (ref 80–100)
MONOCYTES ABSOLUTE: 0.5 K/UL (ref 0.2–0.8)
MONOCYTES RELATIVE PERCENT: 9.3 %
NEUTROPHILS ABSOLUTE: 3.9 K/UL (ref 1.4–6.5)
NEUTROPHILS RELATIVE PERCENT: 72.7 %
PDW BLD-RTO: 13.9 % (ref 11.5–14.5)
PLATELET # BLD: 164 K/UL (ref 130–400)
POTASSIUM SERPL-SCNC: 4.5 MEQ/L (ref 3.5–5.1)
RBC # BLD: 4.03 M/UL (ref 4.7–6.1)
SODIUM BLD-SCNC: 141 MEQ/L (ref 132–144)
TOTAL PROTEIN: 6.2 G/DL (ref 6.4–8.1)
TRIGL SERPL-MCNC: 102 MG/DL (ref 0–200)
WBC # BLD: 5.4 K/UL (ref 4.8–10.8)

## 2017-06-09 PROCEDURE — 36415 COLL VENOUS BLD VENIPUNCTURE: CPT

## 2017-06-09 PROCEDURE — 80061 LIPID PANEL: CPT

## 2017-06-09 PROCEDURE — 80053 COMPREHEN METABOLIC PANEL: CPT

## 2017-06-09 PROCEDURE — 85025 COMPLETE CBC W/AUTO DIFF WBC: CPT

## 2017-06-20 ENCOUNTER — OFFICE VISIT (OUTPATIENT)
Dept: FAMILY MEDICINE CLINIC | Age: 77
End: 2017-06-20

## 2017-06-20 VITALS
RESPIRATION RATE: 18 BRPM | HEIGHT: 67 IN | HEART RATE: 57 BPM | DIASTOLIC BLOOD PRESSURE: 60 MMHG | BODY MASS INDEX: 28.25 KG/M2 | OXYGEN SATURATION: 97 % | TEMPERATURE: 98.2 F | SYSTOLIC BLOOD PRESSURE: 120 MMHG | WEIGHT: 180 LBS

## 2017-06-20 DIAGNOSIS — I10 ESSENTIAL HYPERTENSION: Chronic | ICD-10-CM

## 2017-06-20 DIAGNOSIS — N32.81 OVERACTIVE BLADDER: Primary | Chronic | ICD-10-CM

## 2017-06-20 DIAGNOSIS — I25.10 CORONARY ARTERY DISEASE INVOLVING NATIVE CORONARY ARTERY OF NATIVE HEART WITHOUT ANGINA PECTORIS: Chronic | ICD-10-CM

## 2017-06-20 DIAGNOSIS — N40.1 BPH WITH OBSTRUCTION/LOWER URINARY TRACT SYMPTOMS: Chronic | ICD-10-CM

## 2017-06-20 DIAGNOSIS — R31.29 MICROSCOPIC HEMATURIA: ICD-10-CM

## 2017-06-20 DIAGNOSIS — N13.8 BPH WITH OBSTRUCTION/LOWER URINARY TRACT SYMPTOMS: Chronic | ICD-10-CM

## 2017-06-20 PROCEDURE — 99214 OFFICE O/P EST MOD 30 MIN: CPT | Performed by: FAMILY MEDICINE

## 2017-06-20 RX ORDER — POTASSIUM CHLORIDE 20 MEQ/1
TABLET, EXTENDED RELEASE ORAL
Refills: 1 | COMMUNITY
Start: 2017-06-13 | End: 2019-01-22 | Stop reason: SDUPTHER

## 2017-06-20 RX ORDER — TRAMADOL HYDROCHLORIDE 50 MG/1
50 TABLET ORAL
COMMUNITY
Start: 2016-11-09 | End: 2017-09-28 | Stop reason: SDUPTHER

## 2017-06-20 ASSESSMENT — ENCOUNTER SYMPTOMS
VOMITING: 0
BLOOD IN STOOL: 0
WHEEZING: 0
ABDOMINAL PAIN: 0
COUGH: 0
NAUSEA: 0
ANAL BLEEDING: 0
CHEST TIGHTNESS: 0
DIARRHEA: 0
SHORTNESS OF BREATH: 0

## 2017-06-26 ENCOUNTER — HOSPITAL ENCOUNTER (OUTPATIENT)
Dept: LAB | Age: 77
Discharge: HOME | End: 2017-06-26
Payer: MEDICARE

## 2017-06-26 DIAGNOSIS — R31.29 MICROSCOPIC HEMATURIA: ICD-10-CM

## 2017-06-26 LAB
AMORPHOUS: NORMAL
BILIRUBIN URINE: NEGATIVE
BLOOD, URINE: NEGATIVE
CLARITY: CLEAR
COLOR: YELLOW
EPITHELIAL CELLS, UA: NORMAL /HPF
GLUCOSE URINE: NEGATIVE MG/DL
KETONES, URINE: NEGATIVE MG/DL
LEUKOCYTE ESTERASE, URINE: ABNORMAL
NITRITE, URINE: NEGATIVE
PH UA: 7.5 (ref 5–9)
PROTEIN UA: NEGATIVE MG/DL
RBC UA: NORMAL /HPF (ref 0–2)
RENAL EPITHELIAL, UA: NORMAL /HPF
SPECIFIC GRAVITY UA: 1.01 (ref 1–1.03)
URINE REFLEX TO CULTURE: YES
UROBILINOGEN, URINE: 0.2 E.U./DL
WBC UA: NORMAL /HPF (ref 0–5)

## 2017-06-26 PROCEDURE — 87086 URINE CULTURE/COLONY COUNT: CPT

## 2017-06-26 PROCEDURE — 81001 URINALYSIS AUTO W/SCOPE: CPT

## 2017-06-28 LAB — URINE CULTURE, ROUTINE: NORMAL

## 2017-08-17 PROCEDURE — G8987 SELF CARE CURRENT STATUS: HCPCS

## 2017-08-17 PROCEDURE — G8988 SELF CARE GOAL STATUS: HCPCS

## 2017-08-18 PROCEDURE — G8978 MOBILITY CURRENT STATUS: HCPCS

## 2017-08-18 PROCEDURE — G8979 MOBILITY GOAL STATUS: HCPCS

## 2017-09-28 ENCOUNTER — OFFICE VISIT (OUTPATIENT)
Dept: FAMILY MEDICINE CLINIC | Age: 77
End: 2017-09-28

## 2017-09-28 ENCOUNTER — CARE COORDINATION (OUTPATIENT)
Dept: FAMILY MEDICINE CLINIC | Age: 77
End: 2017-09-28

## 2017-09-28 VITALS
WEIGHT: 181.2 LBS | RESPIRATION RATE: 12 BRPM | BODY MASS INDEX: 28.44 KG/M2 | SYSTOLIC BLOOD PRESSURE: 138 MMHG | DIASTOLIC BLOOD PRESSURE: 78 MMHG | HEIGHT: 67 IN | TEMPERATURE: 97 F | HEART RATE: 60 BPM

## 2017-09-28 DIAGNOSIS — Z23 NEED FOR VACCINATION: ICD-10-CM

## 2017-09-28 DIAGNOSIS — N40.1 BPH WITH OBSTRUCTION/LOWER URINARY TRACT SYMPTOMS: Chronic | ICD-10-CM

## 2017-09-28 DIAGNOSIS — N13.8 BPH WITH OBSTRUCTION/LOWER URINARY TRACT SYMPTOMS: Chronic | ICD-10-CM

## 2017-09-28 DIAGNOSIS — N32.81 OVERACTIVE BLADDER: Chronic | ICD-10-CM

## 2017-09-28 DIAGNOSIS — R42 DIZZINESS: ICD-10-CM

## 2017-09-28 DIAGNOSIS — R26.81 UNSTEADY GAIT: Primary | ICD-10-CM

## 2017-09-28 PROCEDURE — 99214 OFFICE O/P EST MOD 30 MIN: CPT | Performed by: FAMILY MEDICINE

## 2017-09-28 PROCEDURE — 90662 IIV NO PRSV INCREASED AG IM: CPT | Performed by: FAMILY MEDICINE

## 2017-09-28 PROCEDURE — G0008 ADMIN INFLUENZA VIRUS VAC: HCPCS | Performed by: FAMILY MEDICINE

## 2017-09-28 RX ORDER — TROSPIUM CHLORIDE 20 MG/1
TABLET, FILM COATED ORAL
Refills: 0 | COMMUNITY
Start: 2017-08-30 | End: 2020-01-02 | Stop reason: ALTCHOICE

## 2017-09-28 RX ORDER — TERAZOSIN 1 MG/1
CAPSULE ORAL
Refills: 0 | COMMUNITY
Start: 2017-08-05 | End: 2018-03-02 | Stop reason: ALTCHOICE

## 2017-09-28 ASSESSMENT — ENCOUNTER SYMPTOMS
DIARRHEA: 0
SHORTNESS OF BREATH: 0
VOMITING: 0
CHEST TIGHTNESS: 0
COUGH: 0
WHEEZING: 0
SORE THROAT: 0
SINUS PRESSURE: 0
ABDOMINAL PAIN: 0
NAUSEA: 0

## 2017-10-09 ENCOUNTER — TELEPHONE (OUTPATIENT)
Dept: FAMILY MEDICINE CLINIC | Age: 77
End: 2017-10-09

## 2017-10-09 NOTE — TELEPHONE ENCOUNTER
Patient was here to see Dr. Alexandra Malagon for a OV on 9/29/2017. Dr. Alexandra Malagon asked that I call Dr. [facility] office ([phone]) to get most recent Urology note faxed to the office. When I called the other day the office was already closed as they close at 4:30 PM. I called today and the  reported that the patient does not have anyone listed as his PCP so they would need the patient to sign a release form.     I called and spoke with Nicolette Collet  Patient will be in the area on Wednesday  She will have patient stop by to sign release form    TO MA or FO:    Dr. Ibarra Sheets fax number is [phone]

## 2017-10-11 ENCOUNTER — HOSPITAL ENCOUNTER (OUTPATIENT)
Dept: PHYSICAL THERAPY | Age: 77
Setting detail: THERAPIES SERIES
Discharge: HOME | End: 2017-10-11
Payer: MEDICARE

## 2017-10-11 PROCEDURE — G8979 MOBILITY GOAL STATUS: HCPCS

## 2017-10-11 PROCEDURE — G8978 MOBILITY CURRENT STATUS: HCPCS

## 2017-10-11 PROCEDURE — 97110 THERAPEUTIC EXERCISES: CPT

## 2017-10-11 PROCEDURE — 97162 PT EVAL MOD COMPLEX 30 MIN: CPT

## 2017-10-11 PROCEDURE — 97112 NEUROMUSCULAR REEDUCATION: CPT

## 2017-10-11 ASSESSMENT — PAIN SCALES - GENERAL: PAINLEVEL_OUTOF10: 3

## 2017-10-11 ASSESSMENT — PAIN DESCRIPTION - LOCATION: LOCATION: SHOULDER

## 2017-10-11 ASSESSMENT — PAIN DESCRIPTION - ORIENTATION: ORIENTATION: RIGHT

## 2017-10-11 ASSESSMENT — PAIN DESCRIPTION - PAIN TYPE: TYPE: CHRONIC PAIN

## 2017-10-11 NOTE — PROGRESS NOTES
improve with his balance        Therapy Time   Individual Concurrent Group Co-treatment   Time In  2:00pm         Time Out  3:00pm         Minutes  60 min                 JESSEE Hoyos#[id number]

## 2017-10-17 ENCOUNTER — HOSPITAL ENCOUNTER (OUTPATIENT)
Dept: PHYSICAL THERAPY | Age: 77
Setting detail: THERAPIES SERIES
Discharge: HOME | End: 2017-10-17
Payer: MEDICARE

## 2017-10-17 PROCEDURE — 97112 NEUROMUSCULAR REEDUCATION: CPT

## 2017-10-17 PROCEDURE — 97116 GAIT TRAINING THERAPY: CPT

## 2017-10-17 PROCEDURE — 97110 THERAPEUTIC EXERCISES: CPT

## 2017-10-17 NOTE — PROGRESS NOTES
Physical Therapy  Daily Treatment Note  Date: 10/17/2017  Patient Name: Salma Gibbons  MRN: [id number]     :   1940    Treatment Diagnosis: Unsteady gait and dizziness    Subjective:   General  Chart Reviewed: Yes  Additional Pertinent Hx: Pt has dystonia and tremors   Family / Caregiver Present: No  Referring Practitioner: Dr. Jed Lyons  PT Visit Information  Onset Date: 10/11/17  Total # of Visits Approved: 10  Total # of Visits to Date: 2  Plan of Care/Certification Expiration Date: 17  No Show: 0  Canceled Appointment: 0  Subjective  Subjective: Pt. comes to therapy with no c/o pain or reports of LOB. Pain Screening  Patient Currently in Pain: No  Vital Signs  Patient Currently in Pain: No       Treatment Activities:                  Ambulation  Ambulation?: Yes  Ambulation 1  Surface: level tile;carpet  Device: No Device  Assistance: Independent  Quality of Gait: Slight lateral lean to R with gait good pace and sride no LOB   Distance: 440'         Balance  Tandem Stance R Leg:  (30\" w/o UE support )  Tandem Stance L Leg:  (15\" w/o UE support )  Single Leg Stance R Leg:  (3\", w/ single UE support maintains for 30\")  Single Leg Stance L Leg:  (3\" with Single UE support maintains for 30 sec )  Comments: Dynamic Balance Activities w/o device SBA/CGA including ; High knees, Retro gait, Side Stepping, Tandem Gait x2 laps ea, about 20-30 ft. Pt. challenged mostly with tandem gait and Retro. All Activities performed on stable surface. Exercises  Exercise 1: Heel Toe Raises x20 hold 3 sec   Exercise 2: Squats at Bar x10   Exercise 3: SLS BLE x3 hold up to 30 sec 1 with single UE support and x2 w/o UE support   Exercise 4: B Hip ABD w/ BUE support x10 ea. Exercise 5: B Hip Ext w/ BUE support x10 ea.    Exercise 6: Tandem Stance at bar single UE support x2 hold 30 sec x2                                    Assessment:   Conditions Requiring Skilled Therapeutic Intervention  Body structures, Functions, Activity limitations: Decreased functional mobility ; Decreased safe awareness;Decreased balance  Assessment: Pt. with decreased balance and stability requires single UE support to B UE for standing therex. PT. with mild LOB with tandem gait uses stepping strategy to correct and min A. Continue to trial unstable balance activities next session. Treatment Diagnosis: Unsteady gait and dizziness  Prognosis: Good  Patient Education: Issued HO   REQUIRES PT FOLLOW UP: Yes  Activity Tolerance  Activity Tolerance: Patient Tolerated treatment well      G-Code:     OutComes Score                                                     Goals:  Short term goals  Time Frame for Short term goals: 1 week  Short term goal 1: Pt reports able to do ther ex at least 1x/day  Long term goals  Time Frame for Long term goals : 4-5 weeks  Long term goal 1: Pt reports no falls for at least 1 month  Long term goal 2: Pt able to do SLS for 10 or greater B LE to improve balance  Long term goal 3: Improve Nayak Balance  for pt has 15% Disability Score or less (55/65 or better)  Long term goal 4: Indep with HEP  Long term goal 5: Pt able to ambulate for 400' or > with no LOB noted.   Patient Goals   Patient goals : Pt wants to increase in strength and improve with his balance     Plan:      Plan  Times per week: 2x week for 4-5 weeks=10 visits  Current Treatment Recommendations: Strengthening, ROM, Balance Training, Functional Mobility Training, Transfer Training, Endurance Training, Gait Training, Stair training, Neuromuscular Re-education, Pain Management, Home Exercise Program, Equipment Evaluation, Education, & procurement           Therapy Time   Individual Concurrent Group Co-treatment   Time In  1100         Time Out  1200         Minutes  [address] [place] [address]  License and [address] Number: [id number]

## 2017-10-20 ENCOUNTER — HOSPITAL ENCOUNTER (OUTPATIENT)
Dept: PHYSICAL THERAPY | Age: 77
Setting detail: THERAPIES SERIES
Discharge: HOME | End: 2017-10-20
Payer: MEDICARE

## 2017-10-20 PROCEDURE — 97110 THERAPEUTIC EXERCISES: CPT

## 2017-10-20 PROCEDURE — 97112 NEUROMUSCULAR REEDUCATION: CPT

## 2017-10-20 NOTE — PROGRESS NOTES
Physical Therapy  Daily Treatment Note  Date: 10/20/2017  Patient Name: Sierra Sandra  MRN: [id number]     :   1940      Treatment Diagnosis: Unsteady gait and dizziness    Subjective:   General  Chart Reviewed: Yes  Additional Pertinent Hx: Pt has dystonia and tremors   Family / Caregiver Present: No  Referring Practitioner: Dr. Robel Rush  PT Visit Information  Onset Date: 10/11/17  Total # of Visits Approved: 10  Total # of Visits to Date: 3  Plan of Care/Certification Expiration Date: 17  No Show: 0  Canceled Appointment: 0  Subjective  Subjective: \"No matter how many times I try to stand on one foot I can't, it I have alittle counterbalance I have no problem. \"   Pain Screening  Patient Currently in Pain: No  Vital Signs  Patient Currently in Pain: No       Treatment Activities:                           Balance  Comments: Dynamic Balance Activities stable and unstable surface w/o device CGA/SBA, including Retro , Sidestepping, High Knees Fwd AMbulation with head turns and w/ flex/ext. Also performed turning in full 360 and back. Pt. with 2 LOB this date with tandem gait on even surface(held on uneven) and also with ambulation w/ cervical rot on uneven surface. Exercises  Exercise 1: Heel Toe Raises x20 hold 3 sec   Exercise 2: Squats to Chair focus on eccentric lowering 2 x5 no hands   Exercise 3: SLS x2 ea. hold 30 sec with single UE support  Exercise 4: B Hip ABD w/ BUE support 2x10 hold 3 sec  2#    Exercise 5: B Hip Ext w/ BUE support2 x10 ea. hld 3 sec 2#    Exercise 6: Tandem Stance at bar single UE support x2 hold 30 sec x2   Exercise 7: standing March x20 hold 3 sec 1#   Exercise 8: B Hip Flex Standing 2 x10 hold 3 sec  2#                                    Assessment:   Conditions Requiring Skilled Therapeutic Intervention  Body structures, Functions, Activity limitations: Decreased functional mobility ; Decreased safe awareness;Decreased balance  Assessment: Added Dynamic

## 2017-10-24 ENCOUNTER — HOSPITAL ENCOUNTER (OUTPATIENT)
Dept: PHYSICAL THERAPY | Age: 77
Setting detail: THERAPIES SERIES
Discharge: HOME | End: 2017-10-24
Payer: MEDICARE

## 2017-10-24 PROCEDURE — 97112 NEUROMUSCULAR REEDUCATION: CPT

## 2017-10-24 PROCEDURE — 97110 THERAPEUTIC EXERCISES: CPT

## 2017-10-24 NOTE — PROGRESS NOTES
Physical Therapy  Daily Treatment Note  Date: 10/24/2017  Patient Name: Kris Sutton  MRN: [id number]     :   1940    Treatment Diagnosis: Unsteady gait and dizziness    Subjective:   General  Chart Reviewed: Yes  Additional Pertinent Hx: Pt has dystonia and tremors   Family / Caregiver Present: No  Referring Practitioner: Dr. John Dial  PT Visit Information  Onset Date: 10/11/17  Total # of Visits Approved: 10  Total # of Visits to Date: 4  Plan of Care/Certification Expiration Date: 17  No Show: 0  Canceled Appointment: 0  Subjective  Subjective: \"I think we should back off alittle today I was sore for two days p last time. \"   Pt. reports no falls since initiating therapy. Pain Screening  Patient Currently in Pain: No  Vital Signs  Patient Currently in Pain: No       Treatment Activities:                  Ambulation  Ambulation?: Yes  Ambulation 1  Surface: level tile;carpet  Device: No Device  Assistance: Independent  Quality of Gait: Slight lateral lean to R with gait good pace and stride no LOB   Distance: 440'         Balance  Comments: Dynamic Balance Activities unstable surface no AD w/ SBA/CGA which include High knee march, Retro gait , sidestepping. Also Catching and throwing ball with bounce pass in/out of WILLIAM, fwd and also sidestepping. Pt. displays 2 mild LOB which were able to self correct with stepping strategy. Exercises  Exercise 1: Heel Toe Raises x20 hold 3 sec   Exercise 2: Deep Squats x10   Exercise 3: SLS x2 ea. hold 30 sec with single UE support  Exercise 6: Tandem Stance at bar single UE support  hold 30 sec x2   Exercise 9: LAQ 2 x10 hold 5 sec   Exercise 10: Seated B HS Curl GTB 2 x10 hold 5 sec   Exercise 11: Seated Hip ABD x20 hold 5 sec                                    Assessment:   Conditions Requiring Skilled Therapeutic Intervention  Body structures, Functions, Activity limitations: Decreased functional mobility ; Decreased safe awareness;Decreased

## 2017-10-27 ENCOUNTER — HOSPITAL ENCOUNTER (OUTPATIENT)
Dept: PHYSICAL THERAPY | Age: 77
Setting detail: THERAPIES SERIES
Discharge: HOME | End: 2017-10-27
Payer: MEDICARE

## 2017-10-27 PROCEDURE — 97112 NEUROMUSCULAR REEDUCATION: CPT

## 2017-10-27 PROCEDURE — 97110 THERAPEUTIC EXERCISES: CPT

## 2017-10-27 NOTE — PROGRESS NOTES
Diagnosis: Unsteady gait and dizziness  Prognosis: Good  REQUIRES PT FOLLOW UP: Yes  Activity Tolerance  Activity Tolerance: Patient Tolerated treatment well      G-Code:     OutComes Score                                                     Goals:  Short term goals  Time Frame for Short term goals: 1 week  Short term goal 1: Pt reports able to do ther ex at least 1x/day (GOAL MET )  Long term goals  Time Frame for Long term goals : 4-5 weeks  Long term goal 1: Pt reports no falls for at least 1 month  Long term goal 2: Pt able to do SLS for 10 or greater B LE to improve balance  Long term goal 3: Improve Nayak Balance  for pt has 15% Disability Score or less (55/65 or better)  Long term goal 4: Indep with HEP  Long term goal 5: Pt able to ambulate for 400' or > with no LOB noted.  (GOAL MET )  Patient Goals   Patient goals : Pt wants to increase in strength and improve with his balance     Plan:          Times per week: 2x week for 4-5 weeks=10 visits  Current Treatment Recommendations: Strengthening, ROM, Balance Training, Functional Mobility Training, Transfer Training, Endurance Training, Gait Training, Stair training, Neuromuscular Re-education, Pain Management, Home Exercise Program, Equipment Evaluation, Education, & procurement    Therapy Time   Individual Concurrent Group Co-treatment   Time In  1100         Time Out  1200         Minutes  [address] [facility], [facility]  License and [address] Number: [id number]

## 2017-10-31 ENCOUNTER — HOSPITAL ENCOUNTER (OUTPATIENT)
Dept: PHYSICAL THERAPY | Age: 77
Setting detail: THERAPIES SERIES
Discharge: HOME | End: 2017-10-31
Payer: MEDICARE

## 2017-10-31 PROCEDURE — 97110 THERAPEUTIC EXERCISES: CPT

## 2017-10-31 NOTE — PROGRESS NOTES
Tolerance: Patient Tolerated treatment well      G-Code:     OutComes Score                                                     Goals:  Short term goals  Time Frame for Short term goals: 1 week  Short term goal 1: Pt reports able to do ther ex at least 1x/day (GOAL MET )  Long term goals  Time Frame for Long term goals : 4-5 weeks  Long term goal 1: Pt reports no falls for at least 1 month  Long term goal 2: Pt able to do SLS for 10 or greater B LE to improve balance  Long term goal 3: Improve Nayak Balance  for pt has 15% Disability Score or less (55/65 or better)  Long term goal 4: Indep with HEP  Long term goal 5: Pt able to ambulate for 400' or > with no LOB noted.  (GOAL MET )  Patient Goals   Patient goals : Pt wants to increase in strength and improve with his balance     Plan:      Times per week: 2x week for 4-5 weeks=10 visits  Current Treatment Recommendations: Strengthening, ROM, Balance Training, Functional Mobility Training, Transfer Training, Endurance Training, Gait Training, Stair training, Neuromuscular Re-education, Pain Management, Home Exercise Program, Equipment Evaluation, Education, & procurement        Therapy Time   Individual Concurrent Group Co-treatment   Time In  900         Time Out  1000         Minutes  [address] [place] [address]  License and [address] Number: [id number]

## 2017-11-03 ENCOUNTER — HOSPITAL ENCOUNTER (OUTPATIENT)
Dept: PHYSICAL THERAPY | Age: 77
Setting detail: THERAPIES SERIES
Discharge: HOME | End: 2017-11-03
Payer: MEDICARE

## 2017-11-03 PROCEDURE — 97112 NEUROMUSCULAR REEDUCATION: CPT

## 2017-11-03 PROCEDURE — 97110 THERAPEUTIC EXERCISES: CPT

## 2017-11-07 ENCOUNTER — HOSPITAL ENCOUNTER (OUTPATIENT)
Dept: PHYSICAL THERAPY | Age: 77
Setting detail: THERAPIES SERIES
Discharge: HOME | End: 2017-11-07
Payer: MEDICARE

## 2017-11-07 PROCEDURE — 97112 NEUROMUSCULAR REEDUCATION: CPT

## 2017-11-07 PROCEDURE — 97110 THERAPEUTIC EXERCISES: CPT

## 2017-11-07 NOTE — PROGRESS NOTES
Physical Therapy  Daily Treatment Note  Date: 2017  Patient Name: Poncho Chavez  MRN: [id number]     :   1940    Treatment Diagnosis: Unsteady gait and dizziness    Subjective:   General  Chart Reviewed: Yes  Additional Pertinent Hx: Pt has dystonia and tremors   Family / Caregiver Present: No  Referring Practitioner: Dr. Mabel Gallardo  PT Visit Information  Onset Date: 10/11/17  Total # of Visits Approved: 10  Total # of Visits to Date: 8  Plan of Care/Certification Expiration Date: 17  No Show: 0  Canceled Appointment: 0  Subjective  Subjective: Pt. and wife come to therapy with questions regarding HEP for ankle strengthening. \" Pt. reprots hasn't had any fall since initiating PT. Pain Screening  Patient Currently in Pain: No  Vital Signs  Patient Currently in Pain: No       Treatment Activities:                           Balance  Single Leg Stance R Leg:  (5\" )  Single Leg Stance L Leg:  (10\" )  Comments: Dynamic Balance Activities on unstable surface no AD w/ SBA including Marching, Retro, Ambulation with Eyes closed. Tandem Gait on level surface, all activities x20 -30' x2 ea.   No LOB this date           Exercises  Exercise 1: Heel Toe Raises x20 hold 5 sec   Exercise 3: SLS x3 ea no UE support best 5 sec RLE and 10 sec LLE   Exercise 4: B hip ABD w/ 2 # x10 ea. hold 3 sec   Exercise 5: B Hip Ext x10 ea. hold 3 sec 2#   Exercise 6: Tandem Stance at bar no  UE support  hold 30 sec x2   Exercise 7: standing March  x20 hold 3 sec 2#   Exercise 8: B Hip Flex Standing  x10 hold 3 sec  2#   Exercise 9: LAQ x20 hold 3 sec 2#    Exercise 10: Seated B HS Curl GTB x20 hold 3 sec   Exercise 17: Reveiwed ankle amara for HEP   Exercise 18: Catching and throwing ball at rebounder normal WILLIAM, narrow WILLIAM, narrow WILLIAM on blue disk x10 ea with CGA/ Min A                                    Assessment:   Conditions Requiring Skilled Therapeutic Intervention  Body structures, Functions, Activity limitations: Decreased

## 2017-11-08 ENCOUNTER — HOSPITAL ENCOUNTER (OUTPATIENT)
Dept: LAB | Age: 77
Discharge: HOME | End: 2017-11-08
Payer: MEDICARE

## 2017-11-10 ENCOUNTER — HOSPITAL ENCOUNTER (OUTPATIENT)
Age: 77
Setting detail: SPECIMEN
Discharge: HOME | End: 2017-11-10
Payer: MEDICARE

## 2017-11-10 ENCOUNTER — HOSPITAL ENCOUNTER (OUTPATIENT)
Dept: PHYSICAL THERAPY | Age: 77
Setting detail: THERAPIES SERIES
Discharge: HOME | End: 2017-11-10
Payer: MEDICARE

## 2017-11-10 PROCEDURE — 97530 THERAPEUTIC ACTIVITIES: CPT

## 2017-11-10 PROCEDURE — G8979 MOBILITY GOAL STATUS: HCPCS

## 2017-11-10 PROCEDURE — G8978 MOBILITY CURRENT STATUS: HCPCS

## 2017-11-10 PROCEDURE — 87506 IADNA-DNA/RNA PROBE TQ 6-11: CPT

## 2017-11-10 PROCEDURE — 87493 C DIFF AMPLIFIED PROBE: CPT

## 2017-11-10 PROCEDURE — 97112 NEUROMUSCULAR REEDUCATION: CPT

## 2017-11-10 PROCEDURE — 83993 ASSAY FOR CALPROTECTIN FECAL: CPT

## 2017-11-10 PROCEDURE — G8980 MOBILITY D/C STATUS: HCPCS

## 2017-11-10 ASSESSMENT — PAIN SCALES - GENERAL: PAINLEVEL_OUTOF10: 0

## 2017-11-10 NOTE — PROGRESS NOTES
balance exercises. REQUIRES PT FOLLOW UP: No  Activity Tolerance  Activity Tolerance: Patient Tolerated treatment well         Plan Discharged from PT       G-Code  PT G-Codes  Functional Assessment Tool Used: Nayak  Score: 11% (50/56) Disability at DC  Functional Limitation: Mobility: Walking and moving around  Mobility: Walking and Moving Around Current Status (): At least 1 percent but less than 20 percent impaired, limited or restricted  Mobility: Walking and Moving Around Goal Status (): At least 1 percent but less than 20 percent impaired, limited or restricted  Mobility: Walking and Moving Around Discharge Status (): At least 1 percent but less than 20 percent impaired, limited or restricted      Goals  Short term goals  Time Frame for Short term goals: 1 week  Short term goal 1: Pt reports able to do ther ex at least 1x/day (GOAL MET )  Long term goals  Time Frame for Long term goals : 4-5 weeks  Long term goal 1: Pt reports no falls for at least 1 month (GOAL MET )  Long term goal 2: Pt able to do SLS for 10 or greater B LE to improve balance (R SLS= 7 sec  L LE=4 sec  -NOT MET)  Long term goal 3: Improve Nayak Balance  for pt has 15% Disability Score or less (55/65 or better) (Nayak Balance Test at DC= 11% GOAL MET)  Long term goal 4: Indep with HEP (GOAL MET)  Long term goal 5: Pt able to ambulate for 400' or > with no LOB noted.  (GOAL MET )  Patient Goals   Patient goals : Pt wants to increase in strength and improve with his balance        Therapy Time   Individual Concurrent Group Co-treatment   Time In  11:10am         Time Out  11:45am         Minutes  35 min                 JESSEE Mcgowan#[id number]

## 2017-11-11 LAB
CLOSTRIDIUM DIFFICILE DNA AMPLIFICATION: NORMAL
GI BACTERIAL PATHOGENS BY PCR: NORMAL

## 2017-11-14 ENCOUNTER — HOSPITAL ENCOUNTER (OUTPATIENT)
Dept: PHYSICAL THERAPY | Age: 77
Setting detail: THERAPIES SERIES
Discharge: HOME | End: 2017-11-14
Payer: MEDICARE

## 2017-11-15 LAB — CALPROTECTIN: 16 UG/G

## 2017-11-16 ENCOUNTER — APPOINTMENT (OUTPATIENT)
Dept: PHYSICAL THERAPY | Age: 77
End: 2017-11-16
Payer: MEDICARE

## 2017-11-16 ENCOUNTER — HOSPITAL ENCOUNTER (OUTPATIENT)
Dept: LAB | Age: 77
Discharge: HOME | End: 2017-11-16
Payer: MEDICARE

## 2017-11-16 ENCOUNTER — OFFICE VISIT (OUTPATIENT)
Dept: FAMILY MEDICINE CLINIC | Age: 77
End: 2017-11-16

## 2017-11-16 ENCOUNTER — TELEPHONE (OUTPATIENT)
Dept: FAMILY MEDICINE CLINIC | Age: 77
End: 2017-11-16

## 2017-11-16 VITALS
BODY MASS INDEX: 27.59 KG/M2 | HEART RATE: 55 BPM | RESPIRATION RATE: 14 BRPM | WEIGHT: 175.8 LBS | SYSTOLIC BLOOD PRESSURE: 142 MMHG | DIASTOLIC BLOOD PRESSURE: 92 MMHG | HEIGHT: 67 IN | TEMPERATURE: 97.3 F

## 2017-11-16 DIAGNOSIS — Z86.73 HISTORY OF TIA (TRANSIENT ISCHEMIC ATTACK): Chronic | ICD-10-CM

## 2017-11-16 DIAGNOSIS — K92.1 MELENA: ICD-10-CM

## 2017-11-16 DIAGNOSIS — I48.0 PAROXYSMAL ATRIAL FIBRILLATION (HCC): Chronic | ICD-10-CM

## 2017-11-16 DIAGNOSIS — I25.10 CORONARY ARTERY DISEASE INVOLVING NATIVE CORONARY ARTERY OF NATIVE HEART WITHOUT ANGINA PECTORIS: Chronic | ICD-10-CM

## 2017-11-16 DIAGNOSIS — R31.0 GROSS HEMATURIA: Primary | ICD-10-CM

## 2017-11-16 DIAGNOSIS — R10.30 LOWER ABDOMINAL PAIN: ICD-10-CM

## 2017-11-16 DIAGNOSIS — R31.0 GROSS HEMATURIA: ICD-10-CM

## 2017-11-16 DIAGNOSIS — I50.9 CONGESTIVE HEART FAILURE, UNSPECIFIED CONGESTIVE HEART FAILURE CHRONICITY, UNSPECIFIED CONGESTIVE HEART FAILURE TYPE: Chronic | ICD-10-CM

## 2017-11-16 DIAGNOSIS — I10 ESSENTIAL HYPERTENSION: Chronic | ICD-10-CM

## 2017-11-16 PROBLEM — G45.9 TIA (TRANSIENT ISCHEMIC ATTACK): Status: RESOLVED | Noted: 2017-01-16 | Resolved: 2017-11-16

## 2017-11-16 LAB
ALBUMIN SERPL-MCNC: 4.2 G/DL (ref 3.9–4.9)
ALP BLD-CCNC: 83 U/L (ref 35–104)
ALT SERPL-CCNC: 16 U/L (ref 0–41)
ANION GAP SERPL CALCULATED.3IONS-SCNC: 12 MEQ/L (ref 7–13)
AST SERPL-CCNC: 24 U/L (ref 0–40)
BASOPHILS ABSOLUTE: 0 K/UL (ref 0–0.2)
BASOPHILS RELATIVE PERCENT: 0.5 %
BILIRUB SERPL-MCNC: 0.6 MG/DL (ref 0–1.2)
BILIRUBIN URINE: ABNORMAL
BILIRUBIN, POC: NORMAL
BLOOD URINE, POC: NORMAL
BLOOD, URINE: ABNORMAL
BUN BLDV-MCNC: 20 MG/DL (ref 8–23)
CALCIUM SERPL-MCNC: 9.1 MG/DL (ref 8.6–10.2)
CHLORIDE BLD-SCNC: 102 MEQ/L (ref 98–107)
CLARITY, POC: NORMAL
CLARITY: ABNORMAL
CO2: 28 MEQ/L (ref 22–29)
COLOR, POC: NORMAL
COLOR: ABNORMAL
CREAT SERPL-MCNC: 1.26 MG/DL (ref 0.7–1.2)
EOSINOPHILS ABSOLUTE: 0.4 K/UL (ref 0–0.7)
EOSINOPHILS RELATIVE PERCENT: 4.4 %
EPITHELIAL CELLS, UA: ABNORMAL /HPF
GFR AFRICAN AMERICAN: >60
GFR NON-AFRICAN AMERICAN: 55.1
GLOBULIN: 2.4 G/DL (ref 2.3–3.5)
GLUCOSE BLD-MCNC: 93 MG/DL (ref 74–109)
GLUCOSE URINE, POC: NORMAL
GLUCOSE URINE: NEGATIVE MG/DL
HCT VFR BLD CALC: 44.3 % (ref 42–52)
HEMOGLOBIN: 14.5 G/DL (ref 14–18)
KETONES, POC: NORMAL
KETONES, URINE: 15 MG/DL
LEUKOCYTE EST, POC: NORMAL
LEUKOCYTE ESTERASE, URINE: ABNORMAL
LYMPHOCYTES ABSOLUTE: 1 K/UL (ref 1–4.8)
LYMPHOCYTES RELATIVE PERCENT: 12.4 %
MCH RBC QN AUTO: 31.9 PG (ref 27–31.3)
MCHC RBC AUTO-ENTMCNC: 32.6 % (ref 33–37)
MCV RBC AUTO: 97.8 FL (ref 80–100)
MONOCYTES ABSOLUTE: 0.6 K/UL (ref 0.2–0.8)
MONOCYTES RELATIVE PERCENT: 7.6 %
NEUTROPHILS ABSOLUTE: 6 K/UL (ref 1.4–6.5)
NEUTROPHILS RELATIVE PERCENT: 75.1 %
NITRITE, POC: NORMAL
NITRITE, URINE: POSITIVE
PDW BLD-RTO: 14.2 % (ref 11.5–14.5)
PH UA: 7 (ref 5–9)
PH, POC: 7
PLATELET # BLD: 250 K/UL (ref 130–400)
POTASSIUM SERPL-SCNC: 4.7 MEQ/L (ref 3.5–5.1)
PROTEIN UA: 30 MG/DL
PROTEIN, POC: NORMAL
RBC # BLD: 4.53 M/UL (ref 4.7–6.1)
RBC UA: >100 /HPF (ref 0–2)
SODIUM BLD-SCNC: 142 MEQ/L (ref 132–144)
SPECIFIC GRAVITY UA: 1.01 (ref 1–1.03)
SPECIFIC GRAVITY, POC: 1.02
TOTAL PROTEIN: 6.6 G/DL (ref 6.4–8.1)
UROBILINOGEN, POC: NORMAL
UROBILINOGEN, URINE: 1 E.U./DL
WBC # BLD: 8 K/UL (ref 4.8–10.8)
WBC UA: ABNORMAL /HPF (ref 0–5)

## 2017-11-16 PROCEDURE — 81001 URINALYSIS AUTO W/SCOPE: CPT

## 2017-11-16 PROCEDURE — 85025 COMPLETE CBC W/AUTO DIFF WBC: CPT

## 2017-11-16 PROCEDURE — 99214 OFFICE O/P EST MOD 30 MIN: CPT | Performed by: FAMILY MEDICINE

## 2017-11-16 PROCEDURE — 36415 COLL VENOUS BLD VENIPUNCTURE: CPT

## 2017-11-16 PROCEDURE — 80053 COMPREHEN METABOLIC PANEL: CPT

## 2017-11-16 PROCEDURE — 81003 URINALYSIS AUTO W/O SCOPE: CPT | Performed by: FAMILY MEDICINE

## 2017-11-16 PROCEDURE — 87086 URINE CULTURE/COLONY COUNT: CPT

## 2017-11-16 RX ORDER — METRONIDAZOLE 500 MG/1
TABLET ORAL
Refills: 0 | COMMUNITY
Start: 2017-11-10 | End: 2018-03-02 | Stop reason: ALTCHOICE

## 2017-11-16 RX ORDER — CIPROFLOXACIN 500 MG/1
TABLET, FILM COATED ORAL
Refills: 0 | COMMUNITY
Start: 2017-11-10 | End: 2017-11-30 | Stop reason: ALTCHOICE

## 2017-11-16 ASSESSMENT — ENCOUNTER SYMPTOMS
ABDOMINAL PAIN: 1
VOMITING: 0
SHORTNESS OF BREATH: 0
CHEST TIGHTNESS: 0
BLOOD IN STOOL: 0
CONSTIPATION: 0
DIARRHEA: 0
ANAL BLEEDING: 0
WHEEZING: 0
NAUSEA: 0
COUGH: 0

## 2017-11-16 NOTE — PROGRESS NOTES
Subjective:      Patient ID: Vadim Downing is a 76 y.o. male who presents today for:  Chief Complaint   Patient presents with    Hematuria     Patient presents today c/o blood in his urine. SX started today patient is on eliquis. HPI     Patient here for acute visit Re: Gross hematuria. Patient reports today noting red colored blood in urine starting this AM. He reports he has noted red blood with every time he has urinated today. No dysuria, no new frequency or urgency. He reports suprapubic discomfort with chills and sweats over the past 4-6 weeks. He denies any fevers. No reported flank pain. No foul odor noted to urine. Patient reports mild lower abdominal discomfort. He was seen by Dr. Ml Serrano (GI) eight days ago for lower abdominal pain and was treated with course of cipro and flagyl for empiric management of diverticulosis. Patient reports taking cipro and flagyl as directed. He denies any N/V, no diarrhea or constipation.  No hematemesis, no rectal bleeding noted or hematochezia, however, patient does report very dark/black colored stool since this AM.     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    CAD (coronary artery disease)     status post stents in 2006 and 2008    Cervical stenosis of spinal canal     status post laminectomies in cervical spine    CHF (congestive heart failure) ([address] 6/6/2017    Diverticulosis     colonoscopy 9/2016    Diverticulosis of colon 6/6/2017    Fracture of rib of right side     Hernia     umbilical hernia repair    History of TIA (transient ischemic attack) 11/16/2017    HTN (hypertension) 8/26/2014    Hyperlipidemia     Hypertension     Iron deficiency anemia     Melanoma ([address]     right arm 2004    Memory loss 3/4/2014    Osteoarthritis     Paroxysmal atrial fibrillation ([address] 11/16/2017    Renal insufficiency     Status post total knee replacement distress. He has no wheezes. Abdominal: Soft. Bowel sounds are normal. He exhibits no distension. There is tenderness in the right lower quadrant, suprapubic area and left lower quadrant. There is no rigidity, no rebound, no guarding, no CVA tenderness and negative Kern's sign. Genitourinary: Testes normal and penis normal. Right testis shows no mass, no swelling and no tenderness. Left testis shows no mass, no swelling and no tenderness. Circumcised. No penile erythema or penile tenderness. No discharge found. Genitourinary Comments: No blood noted from urethra, No signs of local injury or irritation to head of penis   Musculoskeletal: Normal range of motion. He exhibits no edema. Lymphadenopathy:     He has no cervical adenopathy. Neurological: He is alert and oriented to person, place, and time. Skin: Skin is warm. No rash noted. Psychiatric: He has a normal mood and affect. Ortho Exam (If Applicable)    Results for POC orders placed in visit on 11/16/17   POCT Urinalysis No Micro (Auto)   Result Value Ref Range    Color, UA Dark red     Clarity, UA Cloudy     Glucose, UA POC -     Bilirubin, UA -     Ketones, UA -     Spec Grav, UA 1.020     Blood, UA POC 3+     pH, UA 7.0     Protein, UA POC 1+     Urobilinogen, UA -     Leukocytes, UA 1+     Nitrite, UA +        Assessment & Plan:      1. Gross hematuria  Unclear etiology. Consider relation to use of Eliquis +/- UTI +/- nephrolithiasis. Will send urine for formal culture and urinalysis. No change to chronic urination and patient currently on cipro x 8 days as part of empiric management of lower abdominal discomfort. He has visit already scheduled with urology office in 2 days, he will keep this visit. I will obtain imaging and labwork to further evaluate. - POCT Urinalysis No Micro (Auto)  - URINALYSIS, MICRO; Future  - Urine Culture; Future  - CBC Auto Differential; Future  - Comprehensive Metabolic Panel;  Future  - CT ABDOMEN PELVIS

## 2017-11-17 ENCOUNTER — HOSPITAL ENCOUNTER (OUTPATIENT)
Dept: CT IMAGING | Age: 77
Discharge: HOME | End: 2017-11-17
Payer: MEDICARE

## 2017-11-17 ENCOUNTER — HOSPITAL ENCOUNTER (OUTPATIENT)
Dept: LAB | Age: 77
End: 2017-11-17
Payer: MEDICARE

## 2017-11-17 DIAGNOSIS — R10.30 LOWER ABDOMINAL PAIN: ICD-10-CM

## 2017-11-17 DIAGNOSIS — R31.0 GROSS HEMATURIA: ICD-10-CM

## 2017-11-17 PROCEDURE — 6360000004 HC RX CONTRAST MEDICATION: Performed by: FAMILY MEDICINE

## 2017-11-17 PROCEDURE — 2500000003 HC RX 250 WO HCPCS: Performed by: FAMILY MEDICINE

## 2017-11-17 PROCEDURE — 74177 CT ABD & PELVIS W/CONTRAST: CPT

## 2017-11-17 RX ADMIN — IOPAMIDOL 100 ML: 755 INJECTION, SOLUTION INTRAVENOUS at 13:57

## 2017-11-17 RX ADMIN — BARIUM SULFATE 450 ML: 20 SUSPENSION ORAL at 12:46

## 2017-11-17 NOTE — TELEPHONE ENCOUNTER
Heriberto Joel nurse called back and was given info---she is sending the dr guzman msg to let him know----ah

## 2017-11-17 NOTE — TELEPHONE ENCOUNTER
Dr. Saul Lu saw patient today for Hematuria. He would like for us to call the patients Cardiologist with the following information tomorrow to give them a FYI so we are all on the same page:    Patient was advised to stop Eliquis until cleared by specialist due to gross hematuria and melena with worsening fatigue and abdominal pain. We have referred patient to Urology and GI.     Deo Madrigal MD Consulting Physician        Phone: [phone]

## 2017-11-18 LAB — URINE CULTURE, ROUTINE: NORMAL

## 2017-11-20 DIAGNOSIS — R10.30 LOWER ABDOMINAL PAIN: Primary | ICD-10-CM

## 2017-11-20 DIAGNOSIS — I10 ESSENTIAL HYPERTENSION: Chronic | ICD-10-CM

## 2017-11-20 DIAGNOSIS — I48.0 PAROXYSMAL ATRIAL FIBRILLATION (HCC): Chronic | ICD-10-CM

## 2017-11-30 ENCOUNTER — OFFICE VISIT (OUTPATIENT)
Dept: FAMILY MEDICINE CLINIC | Age: 77
End: 2017-11-30

## 2017-11-30 ENCOUNTER — HOSPITAL ENCOUNTER (OUTPATIENT)
Dept: ULTRASOUND IMAGING | Age: 77
Discharge: HOME | End: 2017-11-30
Payer: MEDICARE

## 2017-11-30 VITALS
HEART RATE: 76 BPM | BODY MASS INDEX: 27.15 KG/M2 | RESPIRATION RATE: 16 BRPM | TEMPERATURE: 97 F | HEIGHT: 67 IN | WEIGHT: 173 LBS | DIASTOLIC BLOOD PRESSURE: 84 MMHG | SYSTOLIC BLOOD PRESSURE: 142 MMHG

## 2017-11-30 DIAGNOSIS — I10 ESSENTIAL HYPERTENSION: Chronic | ICD-10-CM

## 2017-11-30 DIAGNOSIS — Z23 NEED FOR VACCINATION: ICD-10-CM

## 2017-11-30 DIAGNOSIS — R31.0 GROSS HEMATURIA: Primary | ICD-10-CM

## 2017-11-30 DIAGNOSIS — I48.0 PAROXYSMAL ATRIAL FIBRILLATION (HCC): Chronic | ICD-10-CM

## 2017-11-30 DIAGNOSIS — R31.9 HEMATURIA, UNSPECIFIED TYPE: ICD-10-CM

## 2017-11-30 DIAGNOSIS — K51.90 ULCERATIVE COLITIS WITHOUT COMPLICATIONS, UNSPECIFIED LOCATION (HCC): Chronic | ICD-10-CM

## 2017-11-30 DIAGNOSIS — I25.10 CORONARY ARTERY DISEASE INVOLVING NATIVE CORONARY ARTERY OF NATIVE HEART WITHOUT ANGINA PECTORIS: Chronic | ICD-10-CM

## 2017-11-30 DIAGNOSIS — I50.9 CONGESTIVE HEART FAILURE, UNSPECIFIED CONGESTIVE HEART FAILURE CHRONICITY, UNSPECIFIED CONGESTIVE HEART FAILURE TYPE: Chronic | ICD-10-CM

## 2017-11-30 DIAGNOSIS — R10.30 LOWER ABDOMINAL PAIN: ICD-10-CM

## 2017-11-30 LAB
BILIRUBIN, POC: NORMAL
BLOOD URINE, POC: NORMAL
CLARITY, POC: CLEAR
COLOR, POC: YELLOW
GLUCOSE URINE, POC: NORMAL
KETONES, POC: NORMAL
LEUKOCYTE EST, POC: NORMAL
NITRITE, POC: NORMAL
PH, POC: 7
PROTEIN, POC: NORMAL
SPECIFIC GRAVITY, POC: 1.02
UROBILINOGEN, POC: NORMAL

## 2017-11-30 PROCEDURE — 90732 PPSV23 VACC 2 YRS+ SUBQ/IM: CPT | Performed by: FAMILY MEDICINE

## 2017-11-30 PROCEDURE — 99214 OFFICE O/P EST MOD 30 MIN: CPT | Performed by: FAMILY MEDICINE

## 2017-11-30 PROCEDURE — 81003 URINALYSIS AUTO W/O SCOPE: CPT | Performed by: FAMILY MEDICINE

## 2017-11-30 PROCEDURE — 76770 US EXAM ABDO BACK WALL COMP: CPT

## 2017-11-30 PROCEDURE — G0009 ADMIN PNEUMOCOCCAL VACCINE: HCPCS | Performed by: FAMILY MEDICINE

## 2017-11-30 RX ORDER — NITROFURANTOIN 25; 75 MG/1; MG/1
CAPSULE ORAL
Refills: 0 | COMMUNITY
Start: 2017-11-27 | End: 2018-03-02 | Stop reason: ALTCHOICE

## 2017-11-30 ASSESSMENT — ENCOUNTER SYMPTOMS
VOMITING: 0
ABDOMINAL PAIN: 1
CHEST TIGHTNESS: 0
NAUSEA: 0
SHORTNESS OF BREATH: 0
CONSTIPATION: 0
BLOOD IN STOOL: 0
WHEEZING: 0
DIARRHEA: 0
COUGH: 0
ANAL BLEEDING: 0

## 2017-11-30 NOTE — PROGRESS NOTES
 potassium chloride (KLOR-CON M) 20 MEQ extended release tablet   1    Magnesium 500 MG TABS Take by mouth      propranolol (INDERAL LA) 120 MG extended release capsule       acetaminophen (TYLENOL) 325 MG tablet Take 650 mg by mouth every 6 hours as needed for Pain      torsemide (DEMADEX) 20 MG tablet   1    traMADol (ULTRAM) 50 MG tablet Take 50 mg by mouth      primidone (MYSOLINE) 50 MG tablet Take 1 tablet by mouth 2 times daily 90 tablet 3    mesalamine (LIALDA) 1.2 G EC tablet Take 1,200 mg by mouth daily (with breakfast)      atorvastatin (LIPITOR) 10 MG tablet   0    beclomethasone (QVAR) 80 MCG/ACT inhaler Inhale 2 puffs into the lungs      donepezil (ARICEPT) 5 MG tablet   0    amLODIPine-benazepril (LOTREL) 5-20 MG per capsule Take 1 capsule by mouth 2 times daily 180 capsule 3    aspirin 81 MG EC tablet Take 81 mg by mouth daily Patient is taking once a week      Multiple Vitamin (DAILY VITAMIN PO)   Take 1 tablet by mouth daily        No current facility-administered medications on file prior to visit. Allergies:  Codeine and Morphine    Review of Systems   Constitutional: Negative for appetite change, chills, diaphoresis, fatigue and fever. Respiratory: Negative for cough, chest tightness, shortness of breath and wheezing. Cardiovascular: Negative for chest pain, palpitations and leg swelling. No orthopnea, No PND   Gastrointestinal: Positive for abdominal pain. Negative for anal bleeding, blood in stool, constipation, diarrhea, nausea and vomiting. No heartburn, No melena   Genitourinary: Negative for dysuria, flank pain, frequency, hematuria and urgency. Musculoskeletal: Negative for myalgias. Skin: Negative for rash. Neurological: Negative for dizziness, syncope, weakness, light-headedness, numbness and headaches.        Objective:     BP (!) 142/84 (Site: Left Arm, Position: Sitting)   Pulse 76   Temp 97 °F (36.1 °C) (Temporal)   Resp 16   Ht 5' 7\" (1.702 m)   Wt 173 lb (78.5 kg)   BMI 27.10 kg/m²     Physical Exam   Constitutional: He is oriented to person, place, and time. He appears well-developed and well-nourished. Neck: Neck supple. Carotid bruit is not present. No thyromegaly present. Cardiovascular: Normal rate, regular rhythm, normal heart sounds and intact distal pulses. No murmur heard. Pulmonary/Chest: Effort normal and breath sounds normal. No respiratory distress. He has no wheezes. Abdominal: Soft. Bowel sounds are normal. He exhibits no distension. There is no tenderness. There is no rebound and no guarding. Musculoskeletal: Normal range of motion. He exhibits no edema. Lymphadenopathy:     He has no cervical adenopathy. Neurological: He is alert and oriented to person, place, and time. Skin: Skin is warm. No rash noted. Psychiatric: He has a normal mood and affect. Ortho Exam (If Applicable)    Results for POC orders placed in visit on 11/30/17   POCT Urinalysis No Micro (Auto)   Result Value Ref Range    Color, UA yellow     Clarity, UA clear     Glucose, UA POC -     Bilirubin, UA -     Ketones, UA -     Spec Grav, UA 1.020     Blood, UA POC -     pH, UA 7.0     Protein, UA POC + -     Urobilinogen, UA -     Leukocytes, UA -     Nitrite, UA -        Assessment & Plan:      1. Gross hematuria  Resolved since most recent visit. Urology evaluation is underway. Will await recommendations per specialist    - POCT Urinalysis No Micro (Auto)    2. Lower abdominal pain  Likely related to colitis. Patient with continued mild intermittent abdominal complaints of discomfort. He was offered referral to a [facility] GI [facility]. Patient and wife declined this referral today. He'll think about doing this after urology evaluation is completed for his gross hematuria. 3. Essential hypertension  Borderline elevated today in the office.   Patient will continue with current medication and

## 2017-12-04 ENCOUNTER — TELEPHONE (OUTPATIENT)
Dept: FAMILY MEDICINE CLINIC | Age: 77
End: 2017-12-04

## 2017-12-07 ENCOUNTER — PATIENT MESSAGE (OUTPATIENT)
Dept: FAMILY MEDICINE CLINIC | Age: 77
End: 2017-12-07

## 2017-12-07 DIAGNOSIS — I10 ESSENTIAL HYPERTENSION: Primary | Chronic | ICD-10-CM

## 2017-12-07 RX ORDER — AMLODIPINE BESYLATE AND BENAZEPRIL HYDROCHLORIDE 5; 20 MG/1; MG/1
1 CAPSULE ORAL 2 TIMES DAILY
Qty: 180 CAPSULE | Refills: 3 | Status: SHIPPED | OUTPATIENT
Start: 2017-12-07 | End: 2018-12-06 | Stop reason: SDUPTHER

## 2017-12-07 NOTE — TELEPHONE ENCOUNTER
From: Poncho Chavez  To: Zohreh Leiva MD  Sent: 12/7/2017 8:59 AM EST  Subject: Prescription Question    This message is being sent by Micaela Bhagat. Daniel on behalf of Ca Sanchez. I need a refill for Amlodipine Benazepril 5-20 mg. Thank you.

## 2017-12-13 ENCOUNTER — NURSE ONLY (OUTPATIENT)
Dept: FAMILY MEDICINE CLINIC | Age: 77
End: 2017-12-13

## 2017-12-13 ENCOUNTER — TELEPHONE (OUTPATIENT)
Dept: FAMILY MEDICINE CLINIC | Age: 77
End: 2017-12-13

## 2017-12-13 VITALS — DIASTOLIC BLOOD PRESSURE: 90 MMHG | SYSTOLIC BLOOD PRESSURE: 152 MMHG

## 2017-12-13 DIAGNOSIS — I10 ESSENTIAL HYPERTENSION: Primary | Chronic | ICD-10-CM

## 2017-12-13 NOTE — TELEPHONE ENCOUNTER
patient in today for  BP check , his machine it was 161/85 left arm ,  Ours was 152/90  RIGHT in office manual.please advise

## 2017-12-13 NOTE — PROGRESS NOTES
Patient in office for BP check. Telephone call sent to PCP.   PATIENT FIRST BP IS FROM HIS MACHINE , SECOUND IS OURS IN OFFICE PLEASE ADVISE

## 2017-12-18 ENCOUNTER — PATIENT MESSAGE (OUTPATIENT)
Dept: FAMILY MEDICINE CLINIC | Age: 77
End: 2017-12-18

## 2017-12-18 NOTE — TELEPHONE ENCOUNTER
From: Sierra Sandra  To: Celeste Latif MD  Sent: 12/18/2017 12:36 PM EST  Subject: Non-Urgent Medical Question    This message is being sent by Dilip Godfrey. Daniel on behalf of Sue Lombardi. Daniel.     we're you able to contact [facility] about a second opinion for Vincenzo Robbins since he can't get into [facility] until February 16th?

## 2017-12-20 RX ORDER — PROPRANOLOL HYDROCHLORIDE 160 MG/1
160 CAPSULE, EXTENDED RELEASE ORAL DAILY
Qty: 30 CAPSULE | Refills: 5 | Status: SHIPPED | OUTPATIENT
Start: 2017-12-20 | End: 2018-06-14 | Stop reason: SDUPTHER

## 2017-12-20 NOTE — TELEPHONE ENCOUNTER
Please call patient and wife to inform them that blood pressure remains elevated. I would like for him to increase his dose of propranolol to 160 mg daily. I have sent a prescription for the new higher dose tablet to his pharmacy.   He should return to the office in 2 weeks for another blood pressure check to determine if any further medication adjustments are needed

## 2018-03-02 ENCOUNTER — OFFICE VISIT (OUTPATIENT)
Dept: FAMILY MEDICINE CLINIC | Age: 78
End: 2018-03-02
Payer: MEDICARE

## 2018-03-02 ENCOUNTER — HOSPITAL ENCOUNTER (OUTPATIENT)
Dept: LAB | Age: 78
Discharge: HOME | End: 2018-03-02
Payer: MEDICARE

## 2018-03-02 VITALS
BODY MASS INDEX: 27.21 KG/M2 | DIASTOLIC BLOOD PRESSURE: 82 MMHG | WEIGHT: 173.4 LBS | HEIGHT: 67 IN | HEART RATE: 54 BPM | RESPIRATION RATE: 14 BRPM | SYSTOLIC BLOOD PRESSURE: 142 MMHG | TEMPERATURE: 97.3 F | OXYGEN SATURATION: 94 %

## 2018-03-02 DIAGNOSIS — K51.90 ULCERATIVE COLITIS WITHOUT COMPLICATIONS, UNSPECIFIED LOCATION (HCC): Chronic | ICD-10-CM

## 2018-03-02 DIAGNOSIS — I10 ESSENTIAL HYPERTENSION: Chronic | ICD-10-CM

## 2018-03-02 DIAGNOSIS — I25.10 CORONARY ARTERY DISEASE INVOLVING NATIVE CORONARY ARTERY OF NATIVE HEART WITHOUT ANGINA PECTORIS: Chronic | ICD-10-CM

## 2018-03-02 DIAGNOSIS — N13.8 BPH WITH OBSTRUCTION/LOWER URINARY TRACT SYMPTOMS: Chronic | ICD-10-CM

## 2018-03-02 DIAGNOSIS — I48.0 PAROXYSMAL ATRIAL FIBRILLATION (HCC): Chronic | ICD-10-CM

## 2018-03-02 DIAGNOSIS — K51.919 ULCERATIVE COLITIS WITH COMPLICATION, UNSPECIFIED LOCATION (HCC): ICD-10-CM

## 2018-03-02 DIAGNOSIS — I25.10 CORONARY ARTERY DISEASE INVOLVING NATIVE CORONARY ARTERY OF NATIVE HEART WITHOUT ANGINA PECTORIS: Primary | Chronic | ICD-10-CM

## 2018-03-02 DIAGNOSIS — Z23 NEED FOR VACCINATION: ICD-10-CM

## 2018-03-02 DIAGNOSIS — I50.9 CONGESTIVE HEART FAILURE, UNSPECIFIED CONGESTIVE HEART FAILURE CHRONICITY, UNSPECIFIED CONGESTIVE HEART FAILURE TYPE: Chronic | ICD-10-CM

## 2018-03-02 DIAGNOSIS — E78.5 HYPERLIPIDEMIA, UNSPECIFIED HYPERLIPIDEMIA TYPE: Chronic | ICD-10-CM

## 2018-03-02 DIAGNOSIS — N40.1 BPH WITH OBSTRUCTION/LOWER URINARY TRACT SYMPTOMS: Chronic | ICD-10-CM

## 2018-03-02 DIAGNOSIS — J45.30 MILD PERSISTENT ASTHMA WITHOUT COMPLICATION: Chronic | ICD-10-CM

## 2018-03-02 DIAGNOSIS — R10.30 LOWER ABDOMINAL PAIN: ICD-10-CM

## 2018-03-02 DIAGNOSIS — R31.9 HEMATURIA, UNSPECIFIED TYPE: ICD-10-CM

## 2018-03-02 LAB
ANION GAP SERPL CALCULATED.3IONS-SCNC: 12 MEQ/L (ref 7–13)
BUN BLDV-MCNC: 23 MG/DL (ref 8–23)
CALCIUM SERPL-MCNC: 8.9 MG/DL (ref 8.6–10.2)
CHLORIDE BLD-SCNC: 104 MEQ/L (ref 98–107)
CHOLESTEROL, TOTAL: 152 MG/DL (ref 0–199)
CO2: 29 MEQ/L (ref 22–29)
CREAT SERPL-MCNC: 0.98 MG/DL (ref 0.7–1.2)
GFR AFRICAN AMERICAN: >60
GFR NON-AFRICAN AMERICAN: >60
GLUCOSE BLD-MCNC: 99 MG/DL (ref 74–109)
HDLC SERPL-MCNC: 59 MG/DL (ref 40–59)
LDL CHOLESTEROL CALCULATED: 72 MG/DL (ref 0–129)
POTASSIUM SERPL-SCNC: 4.6 MEQ/L (ref 3.5–5.1)
SODIUM BLD-SCNC: 145 MEQ/L (ref 132–144)
TRIGL SERPL-MCNC: 105 MG/DL (ref 0–200)
TSH SERPL DL<=0.05 MIU/L-ACNC: 1.6 UIU/ML (ref 0.27–4.2)

## 2018-03-02 PROCEDURE — 84443 ASSAY THYROID STIM HORMONE: CPT

## 2018-03-02 PROCEDURE — 99214 OFFICE O/P EST MOD 30 MIN: CPT | Performed by: FAMILY MEDICINE

## 2018-03-02 PROCEDURE — 80061 LIPID PANEL: CPT

## 2018-03-02 PROCEDURE — 80048 BASIC METABOLIC PNL TOTAL CA: CPT

## 2018-03-02 PROCEDURE — 36415 COLL VENOUS BLD VENIPUNCTURE: CPT

## 2018-03-02 RX ORDER — APIXABAN 5 MG/1
5 TABLET, FILM COATED ORAL 2 TIMES DAILY
COMMUNITY
Start: 2018-01-31

## 2018-03-02 ASSESSMENT — ENCOUNTER SYMPTOMS
CONSTIPATION: 0
COUGH: 0
CHEST TIGHTNESS: 0
DIARRHEA: 0
NAUSEA: 0
VOMITING: 0
WHEEZING: 0
SHORTNESS OF BREATH: 0
ABDOMINAL PAIN: 0

## 2018-06-14 ENCOUNTER — PATIENT MESSAGE (OUTPATIENT)
Dept: FAMILY MEDICINE CLINIC | Age: 78
End: 2018-06-14

## 2018-06-14 DIAGNOSIS — I10 ESSENTIAL HYPERTENSION: Chronic | ICD-10-CM

## 2018-06-14 RX ORDER — PROPRANOLOL HYDROCHLORIDE 160 MG/1
160 CAPSULE, EXTENDED RELEASE ORAL DAILY
Qty: 30 CAPSULE | Refills: 5 | Status: SHIPPED | OUTPATIENT
Start: 2018-06-14 | End: 2018-07-02 | Stop reason: SDUPTHER

## 2018-07-02 ENCOUNTER — OFFICE VISIT (OUTPATIENT)
Dept: FAMILY MEDICINE CLINIC | Age: 78
End: 2018-07-02
Payer: MEDICARE

## 2018-07-02 VITALS
OXYGEN SATURATION: 95 % | RESPIRATION RATE: 14 BRPM | BODY MASS INDEX: 27.81 KG/M2 | HEIGHT: 67 IN | WEIGHT: 177.2 LBS | TEMPERATURE: 97.9 F | DIASTOLIC BLOOD PRESSURE: 78 MMHG | HEART RATE: 54 BPM | SYSTOLIC BLOOD PRESSURE: 146 MMHG

## 2018-07-02 DIAGNOSIS — N13.8 BPH WITH OBSTRUCTION/LOWER URINARY TRACT SYMPTOMS: Chronic | ICD-10-CM

## 2018-07-02 DIAGNOSIS — M15.9 PRIMARY OSTEOARTHRITIS INVOLVING MULTIPLE JOINTS: Chronic | ICD-10-CM

## 2018-07-02 DIAGNOSIS — K57.30 DIVERTICULOSIS OF COLON: Chronic | ICD-10-CM

## 2018-07-02 DIAGNOSIS — I25.10 CORONARY ARTERY DISEASE INVOLVING NATIVE CORONARY ARTERY OF NATIVE HEART WITHOUT ANGINA PECTORIS: Chronic | ICD-10-CM

## 2018-07-02 DIAGNOSIS — G25.0 BENIGN HEAD TREMOR: Chronic | ICD-10-CM

## 2018-07-02 DIAGNOSIS — E78.5 HYPERLIPIDEMIA, UNSPECIFIED HYPERLIPIDEMIA TYPE: Chronic | ICD-10-CM

## 2018-07-02 DIAGNOSIS — N40.1 BPH WITH OBSTRUCTION/LOWER URINARY TRACT SYMPTOMS: Chronic | ICD-10-CM

## 2018-07-02 DIAGNOSIS — I10 ESSENTIAL HYPERTENSION: Chronic | ICD-10-CM

## 2018-07-02 DIAGNOSIS — K51.90 ULCERATIVE COLITIS WITHOUT COMPLICATIONS, UNSPECIFIED LOCATION (HCC): Chronic | ICD-10-CM

## 2018-07-02 DIAGNOSIS — J45.30 MILD PERSISTENT ASTHMA WITHOUT COMPLICATION: Chronic | ICD-10-CM

## 2018-07-02 DIAGNOSIS — I50.9 CHRONIC CONGESTIVE HEART FAILURE, UNSPECIFIED CONGESTIVE HEART FAILURE TYPE: Chronic | ICD-10-CM

## 2018-07-02 DIAGNOSIS — N32.81 OVERACTIVE BLADDER: Chronic | ICD-10-CM

## 2018-07-02 DIAGNOSIS — I48.0 PAROXYSMAL ATRIAL FIBRILLATION (HCC): Chronic | ICD-10-CM

## 2018-07-02 DIAGNOSIS — Z23 NEED FOR VACCINATION: ICD-10-CM

## 2018-07-02 DIAGNOSIS — Z00.00 ROUTINE GENERAL MEDICAL EXAMINATION AT A HEALTH CARE FACILITY: Primary | ICD-10-CM

## 2018-07-02 PROCEDURE — G0438 PPPS, INITIAL VISIT: HCPCS | Performed by: FAMILY MEDICINE

## 2018-07-02 RX ORDER — BECLOMETHASONE DIPROPIONATE HFA 80 UG/1
AEROSOL, METERED RESPIRATORY (INHALATION)
Refills: 0 | COMMUNITY
Start: 2018-05-14 | End: 2018-07-02 | Stop reason: SDUPTHER

## 2018-07-02 RX ORDER — BECLOMETHASONE DIPROPIONATE HFA 80 UG/1
AEROSOL, METERED RESPIRATORY (INHALATION)
Qty: 1 INHALER | Refills: 5 | Status: SHIPPED | OUTPATIENT
Start: 2018-07-02 | End: 2020-01-02 | Stop reason: ALTCHOICE

## 2018-07-02 RX ORDER — PROPRANOLOL HYDROCHLORIDE 120 MG/1
120 CAPSULE, EXTENDED RELEASE ORAL DAILY
Qty: 30 CAPSULE | Refills: 5 | Status: SHIPPED | OUTPATIENT
Start: 2018-07-02 | End: 2019-03-07 | Stop reason: SDUPTHER

## 2018-07-02 RX ORDER — HYDRALAZINE HYDROCHLORIDE 10 MG/1
10 TABLET, FILM COATED ORAL 3 TIMES DAILY
Qty: 90 TABLET | Refills: 5 | Status: SHIPPED | OUTPATIENT
Start: 2018-07-02 | End: 2019-01-03 | Stop reason: SDUPTHER

## 2018-07-02 ASSESSMENT — ANXIETY QUESTIONNAIRES: GAD7 TOTAL SCORE: 1

## 2018-07-02 ASSESSMENT — LIFESTYLE VARIABLES: HOW OFTEN DO YOU HAVE A DRINK CONTAINING ALCOHOL: 0

## 2018-07-02 ASSESSMENT — PATIENT HEALTH QUESTIONNAIRE - PHQ9: SUM OF ALL RESPONSES TO PHQ QUESTIONS 1-9: 0

## 2018-07-02 NOTE — PROGRESS NOTES
Medicare Annual Wellness Visit    Name: Diann Everett Date: 2018   MRN: [id number] Sex: male   : 1940 Age: [de-identified] y.o. Rbeeca Horse is here for   Chief Complaint   Patient presents with   [facility]     Screenings for behavioral, psychosocial and functional/safety risks, and cognitive dysfunction are all negative except as indicated below. These results, as well as other patient data from the [address] form, are documented in Flowsheets linked to this Encounter. Allergies   Allergen Reactions    Codeine      nausea    Morphine     Singulair [Montelukast] Hives       Prior to Visit Medications    Medication Sig Taking?  Authorizing Provider   zoster recombinant adjuvanted vaccine (SHINGRIX) 50 MCG SUSR injection Inject 0.5 mLs into the muscle once for 1 dose Yes Stephanie Bower MD   QVAR REDIHALER 80 MCG/ACT AERB inhaler inhale 2 puffs by mouth twice a day Yes Stephanie Bower MD   propranolol (INDERAL LA) 120 MG extended release capsule Take 1 capsule by mouth daily Yes Stephanie Bower MD   hydrALAZINE (APRESOLINE) 10 MG tablet Take 1 tablet by mouth 3 times daily Yes Stephanie Bower MD   Fexofenadine HCl (ALLEGRA PO) Take by mouth Yes Historical Provider, MD   ELIQUIS 5 MG TABS tablet  Yes Historical Provider, MD   atorvastatin (LIPITOR) 10 MG tablet Take 1 tablet by mouth daily Yes Stephanie Bower MD   amLODIPine-benazepril (LOTREL) 5-20 MG per capsule Take 1 capsule by mouth 2 times daily Yes Stephanie Bower MD   trospium (SANCTURA) 20 MG tablet  Yes Historical Provider, MD   potassium chloride (KLOR-CON M) 20 MEQ extended release tablet  Yes Historical Provider, MD   Magnesium 500 MG TABS Take by mouth Yes Historical Provider, MD   acetaminophen (TYLENOL) 325 MG tablet Take 650 mg by mouth every 6 hours as needed for Pain Yes Historical Provider, MD   torsemide (DEMADEX) 20 MG tablet  Yes Historical Provider, MD   primidone (MYSOLINE) 50 MG tablet more falls in past year?: no  Fall with injury in past year?: no    Fall Risk Interventions:    · None indicated    Depression:   PHQ-2 Score: 0  Depression Interventions:  · None indicated    Anxiety:   Anxiety Score: 1  Anxiety Interventions:  · None indicated    Cognitive: Words recalled: 3  Clock Drawing Test (CDT) Score: Normal  Cognitive Impairment Interventions:  · None indicated    Substance Abuse:  Social History     Social History     Social History Main Topics    Smoking status: Never Smoker    Smokeless tobacco: Never Used    Alcohol use Yes      Comment: occasional    Drug use: No    Sexual activity: Yes     Partners: Female        Substance Abuse Interventions:  · None indicated    Health Risk Assessment:      General  In general, how would you say your health is?: Good  In the past 7 days, have you experienced any of the following?: None of These  Do you get the social and emotional support that you need?: Yes  Do you have a Living Will?: (!) No  General Health Risk Interventions:  · No Living Will: additional information provided       Body mass index is 27.75 kg/m².   Health Habits/Nutrition Interventions:  · Inadequate physical activity:  educational materials provided to promote increased physical activity  · Nutritional issues:  educational materials for healthy, well-balanced diet provided  · Dental exam overdue:  patient encouraged to make appointment with his/her dentist     Hearing/Vision  Do you or your family notice any trouble with your hearing?: (!) Yes  Do you have difficulty driving, watching TV, or doing any of your daily activities because of your eyesight?: No  Have you had an eye exam within the past year?: (!) No  Hearing/Vision Interventions:  · Hearing concerns:  patient declines any further evaluation/treatment for hearing issues  · Vision concerns:  patient encouraged to make appointment with his/her eye specialist    Safety  Do you have working smoke detectors?: Yes  Have all throw rugs been removed or fastened?: (!) No  Do you have non-slip mats in all bathtubs?: (!) No  Do all of your stairways have a railing or banister?: Yes  Are your doorways, halls and stairs free of clutter?: Yes  Do you always fasten your seatbelt when you are in a car?: Yes  Safety Interventions:  · Home safety tips provided     ADLs  In the past 7 days, did you need help from others to perform any of the following everyday activities?: None  In the past 7 days, did you need help from others to take care of any of the following?: None  ADL Interventions:  · None indicated    Personalized Preventive Plan   Current Health Maintenance Status    Immunization History   Administered Date(s) Administered    Influenza Vaccine, unspecified formulation 10/16/2016    Influenza Virus Vaccine 10/01/2014    Influenza, High Dose 09/28/2017    PPD Test 08/04/2016    Pneumococcal 13-valent Conjugate ([id number]) 10/16/2016    Pneumococcal Polysaccharide ([id number]) 11/30/2017    Tdap (Boostrix, Adacel) 03/09/2018    Zoster Live (Zostavax) 10/11/2013       Health Maintenance   Topic Date Due    Shingles Vaccine (1 of 2 - 2 Dose Series) 12/11/2013    Flu vaccine (1) 09/01/2018    Potassium monitoring  03/02/2019    Creatinine monitoring  03/02/2019    Colon cancer screen colonoscopy  09/16/2026    DTaP/Tdap/Td vaccine (2 - Td) 03/09/2028    Pneumococcal low/med risk  Completed       Recommendations for Preventive Services Due: see orders. Recommended screening schedule for the next 5-10 years is provided to the patient in written form: see Patient Instructions/AVS.    Assessment & Plan:      1. Routine general medical examination at a health care facility      2. Essential hypertension  Blood pressure above goal control in the office. Patient noted to be bradycardic. We will lower her dose of propranolol and add hydralazine for further blood pressure management.   Patient will keep close follow-up with nurse Previous Medication    PROPRANOLOL (INDERAL LA) 120 MG EXTENDED RELEASE CAPSULE propranolol (INDERAL LA) 160 MG extended release capsule       Take 1 capsule by mouth daily    Take 1 capsule by mouth daily    QVAR REDIHALER 80 MCG/ACT AERB INHALER QVAR REDIHALER 80 MCG/ACT AERB inhaler       inhale 2 puffs by mouth twice a day    inhale 2 puffs by mouth twice a day       Medications Discontinued During This Encounter   Medication Reason    beclomethasone (QVAR) 80 MCG/ACT inhaler DUPLICATE    traMADol (ULTRAM) 50 MG tablet Therapy completed    QVAR REDIHALER 80 MCG/ACT AERB inhaler REORDER    propranolol (INDERAL LA) 160 MG extended release capsule REORDER       Return for nurse visit BP check in 1 week, Chronic Disease Check in 4 Months.     Juan C Hall MD

## 2018-07-02 NOTE — PATIENT INSTRUCTIONS
pressure. DASH stands for Dietary Approaches to Stop Hypertension. Hypertension is high blood pressure. The DASH diet focuses on eating foods that are high in calcium, potassium, and magnesium. These nutrients can lower blood pressure. The foods that are highest in these nutrients are fruits, vegetables, low-fat dairy products, nuts, seeds, and legumes. But taking calcium, potassium, and magnesium supplements instead of eating foods that are high in those nutrients does not have the same effect. The DASH diet also includes whole grains, fish, and poultry. The DASH diet is one of several lifestyle changes your doctor may recommend to lower your high blood pressure. Your doctor may also want you to decrease the amount of sodium in your diet. Lowering sodium while following the DASH diet can lower blood pressure even further than just the DASH diet alone. Follow-up care is a key part of your treatment and safety. Be sure to make and go to all appointments, and call your doctor if you are having problems. Its also a good idea to know your test results and keep a list of the medicines you take. How can you care for yourself at home? Following the DASH diet  Eat 4 to 5 servings of fruit each day. A serving is 1 medium-sized piece of fruit, ½ cup chopped or canned fruit, 1/4 cup dried fruit, or 4 ounces (½ cup) of fruit juice. Choose fruit more often than fruit juice. Eat 4 to 5 servings of vegetables each day. A serving is 1 cup of lettuce or raw leafy vegetables, ½ cup of chopped or cooked vegetables, or 4 ounces (½ cup) of vegetable juice. Choose vegetables more often than vegetable juice. Get 2 to 3 servings of low-fat and fat-free dairy each day. A serving is 8 ounces of milk, 1 cup of yogurt, or 1 ½ ounces of cheese. Eat 7 to 8 servings of grains each day.  A serving is 1 slice of bread, 1 ounce of dry cereal, or ½ cup of cooked rice, pasta, or cooked cereal. Try to choose whole-grain products as much as or shower, or using the toilet. To avoid dizziness, get up slowly from a lying down position. Sit up first, dangling your legs for a minute or two before rising to a standing position. Overall Home Safety Check   According to the Consumer Product Safety Commision's \"Older Consumer Home Safety Checklist,\" it is important to check for potential hazards in each room. And remember, proper lighting is an essential factor in home safety. If you cannot see clearly, you are more likely to fall. Important questions to ask yourself include:   Are lamp, electric, extension, and telephone cords placed out of the flow of traffic and maintained in good condition? Have frayed cords been replaced? Are all small rugs and runners slip resistant? If not, you can secure them to the floor with a special double-sided carpet tape. Are smoke detectors properly locatedone on every floor of your home and one outside of every sleeping area? Are they in good working order? Are batteries replaced at least once a year? Do you have a well-maintained carbon monoxide detector outside every sleeping are in your home? Does your furniture layout leave plenty of space to maneuver between and around chairs, tables, beds, and sofas? Are hallways, stairs and passages between rooms well lit? Can you reach a lamp without getting out of bed? Are floor surfaces well maintained? Shag rugs, high-pile carpeting, tile floors, and polished wood floors can be particularly slippery. Stairs should always have handrails and be carpeted or fitted with a non-skid tread. Is your telephone easily reachable. Is the cord safely tucked away? Room by Room   According to the Association of Aging, bathrooms and jack are the two most potentially hazardous rooms in your home. In the Kitchen    Be sure your stove is in proper working order and always make sure burners and the oven are off before you go out or go to sleep.     Keep pots on the back burners, turn handles away from the front of the stove, and keep stove clean and free of grease build-up. Kitchen ventilation systems and range exhausts should be working properly. Keep flammable objects such as towels and pot holders away from the cooking area except when in use. Make sure kitchen curtains are tied back. Move cords and appliances away from the sink and hot surfaces. If extension cords are needed, install wiring guides so they do not hang over the sink, range, or working areas. Look for coffee pots, kettles and toaster ovens with automatic shut-offs. Keep a mop handy in the kitchen so you can wipe up spills instantly. You should also have a small fire extinguisher. Arrange your kitchen with frequently used items on lower shelves to avoid the need to stand on a stepstool to reach them. Make sure countertops are well-lit to avoid injuries while cutting and preparing food. In the Bathroom    Use a non-slip mat or decals in the tub and shower, since wet, soapy tile or porcelain surfaces are extremely slippery. Make sure bathroom rugs are non-skid or tape them firmly to the floor. Bathtubs should have at least one, preferably two, grab bars, firmly attached to structural supports in the wall. (Do not use built-in soap holders or glass shower doors as grab bars.)    Tub seats fitted with non-slip material on the legs allow you to wash sitting down. For people with limited mobility, bathtub transfer benches allow you to slide safely into the tub. Raised toilet seats and toilet safety rails are helpful for those with knee or hip problems. In the [facility]    Make sure you use a nightlight and that the area around your bed is clear of potential obstacles. Be careful with electric blankets and never go to sleep with a heating pad, which can cause serious burns even if on a low setting. Use fire-resistant mattress covers and pillows, and NEVER smoke in bed.     Keep a phone next to the bed that is programmed to dial 911 at the push of a button. If you have a chronic condition, you may want to sign on with an automatic call-in service. Typically the system includes a small pendant that connects directly to an emergency medical voice-response system. You should also make arrangements to stay in contact with someonefriend, neighbor, family memberon a regular schedule. Fire Prevention   According to the [employer]. (Smoke Alarms for Every) [address] [place] [address], senior citizens are one of the two highest risk groups for death and serious injuries due to residential fires. When cooking, wear short-sleeved items, never a bulky long-sleeved robe. The [facility]'s Safety Checklist for Older Consumers emphasizes the importance of checking basements, garages, workshops and storage areas for fire hazards, such as volatile liquids, piles of old rags or clothing and overloaded circuits. Never smoke in bed or when lying down on a couch or recliner chair. Small portable electric or kerosene heaters are responsible for many home fires and should be used cautiously if at all. If you do use one, be sure to keep them away from flammable materials. In case of fire, make sure you have a pre-established emergency exit plan. Have a professional check your fireplace and other fuel-burning appliances yearly. Helping Hands   Baby boomers entering the tavarez years will continue to see the development of new products to help older adults live safely and independently in spite of age-related changes. Making Life More Livable  , by Katie Resendiz, lists over 1,000 products for \"living well in the mature years,\" such as bathing and mobility aids, household security devices, ergonomically designed knives and peelers, and faucet valves and knobs for temperature control.  Medical supply stores and organizations are good sources of information about products that improve your quality of life and insure your safety. Last Reviewed: November 2009 Dariusz Trevino MD   Updated: 3/7/2011     ·        Advance Care Planning: Care Instructions  Your Care Instructions    It can be hard to live with an illness that cannot be cured. But if your health is getting worse, you may want to make decisions about end-of-life care. Planning for the end of your life does not mean that you are giving up. It is a way to make sure that your wishes are met. Clearly stating your wishes can make it easier for your loved ones. Making plans while you are still able may also ease your mind and make your final days less stressful and more meaningful. Follow-up care is a key part of your treatment and safety. Be sure to make and go to all appointments, and call your doctor if you are having problems. It's also a good idea to know your test results and keep a list of the medicines you take. What can you do to plan for the end of life? You can bring these issues up with your doctor. You do not need to wait until your doctor starts the conversation. You might start with \"I would not be willing to live with . Christopher Quiet Christopher Quiet Christopher Quiet \" When you complete this sentence it helps your doctor understand your wishes. Talk openly and honestly with your doctor. This is the best way to understand the decisions you will need to make as your health changes. Know that you can always change your mind. Ask your doctor about commonly used life-support measures. These include tube feedings, breathing machines, and fluids given through a vein (IV). Understanding these treatments will help you decide whether you want them. You may choose to have these life-supporting treatments for a limited time. This allows a trial period to see whether they will help you. You may also decide that you want your doctor to take only certain measures to keep you alive. It is important to spell out these conditions so that your doctor and family understand them.   Talk to your doctor about how medical file. Give your family updated copies of the papers. Where can you learn more? Go to [email or web address] and sign in to your [employer] account. Enter P184 in the [facility] box to learn more about \"Advance Care Planning: Care Instructions. \"     If you do not have an account, please click on the \"Sign Up Now\" link. Current as of: October 6, 2017  Content Version: 11.6  © 1429-5946 [employer], [employer] Care instructions adapted under license by [facility] ([facility]). If you have questions about a medical condition or this instruction, always ask your healthcare professional. [address] any warranty or liability for your use of this information. ·        Learning About Living Chris Byers  What is a living will? A living will is a legal form you use to write down the kind of care you want at the end of your life. It is used by the health professionals who will treat you if you aren't able to decide for yourself. If you put your wishes in writing, your loved ones and others will know what kind of care you want. They won't need to guess. This can ease your mind and be helpful to others. A living will is not the same as an estate or property will. An estate will explains what you want to happen with your money and property after you die. Is a living will a legal document? A living will is a legal document. Each state has its own laws about living [place]. If you move to another state, make sure that your living will is legal in the state where you now live. Or you might use a universal form that has been approved by many states. This kind of form can sometimes be completed and stored online. Your electronic copy will then be available wherever you have a connection to the Internet. In most cases, doctors will respect your wishes even if you have a form from a different state. You don't need an  to complete a living will.  But legal advice can be helpful if your state's laws are unclear, your health history is complicated, or your family can't agree on what should be in your living will. You can change your living will at any time. Some people find that their wishes about end-of-life care change as their health changes. In addition to making a living will, think about completing a medical power of  form. This form lets you name the person you want to make end-of-life treatment decisions for you (your \"health care agent\") if you're not able to. Many hospitals and nursing homes will give you the forms you need to complete a living will and a medical power of . Your living will is used only if you can't make or communicate decisions for yourself anymore. If you become able to make decisions again, you can accept or refuse any treatment, no matter what you wrote in your living will. Your state may offer an online registry. This is a place where you can store your living will online so the doctors and nurses who need to treat you can find it right away. What should you think about when creating a living will? Talk about your end-of-life wishes with your family members and your doctor. Let them know what you want. That way the people making decisions for you won't be surprised by your choices. Think about these questions as you make your living will:  Do you know enough about life support methods that might be used? If not, talk to your doctor so you know what might be done if you can't breathe on your own, your heart stops, or you're unable to swallow. What things would you still want to be able to do after you receive life-support methods? Would you want to be able to walk? To speak? To eat on your own? To live without the help of machines? If you have a choice, where do you want to be cared for? In your home? At a hospital or nursing home? Do you want certain Latter-day practices performed if you become very ill?   If you have a choice at the end of your life, where would you prefer to die? At home? In a hospital or nursing home? Somewhere else? Would you prefer to be buried or cremated? Do you want your organs to be donated after you die? What should you do with your living will? Make sure that your family members and your health care agent have copies of your living will. Give your doctor a copy of your living will to keep in your medical record. If you have more than one doctor, make sure that each one has a copy. You may want to put a copy of your living will where it can be easily found. Where can you learn more? Go to [email or web address] and sign in to your [employer] account. Enter K658 in the [employer] box to learn more about \"Learning About Living [place]. \"     If you do not have an account, please click on the \"Sign Up Now\" link. Current as of: October 6, 2017  Content Version: 11.6  © 9102-6904 [employer]. Care instructions adapted under license by [facility] ([facility]). If you have questions about a medical condition or this instruction, always ask your healthcare professional. [address] any warranty or liability for your use of this information. ·        Learning About Durable Power of  for Health Care  What is a durable power of  for health care? A durable power of  for health care is one type of the legal forms called advance directives. It lets you decide who you want to make treatment decisions for you if you cannot speak or decide for yourself. The person you choose is called your health care agent. Another type of advance directive is a living will. It lets you write down what kinds of treatment or life support you want or do not want. What should you think about when choosing a health care agent? Choose your health care agent carefully. This person may or may not be a family member.   Talk to the person before you make your final decision. Make sure he or she is comfortable with this responsibility. It's a good idea to choose someone who:  Is at least 25years old. Knows you well and understands what makes life meaningful for you. Understands your Episcopalian and moral values. Will do what you want, not what he or she wants. Will be able to make difficult choices at a stressful time. Will be able to refuse or stop treatment, if that is what you would want, even if you could die. Will be firm and confident with health professionals if needed. Will ask questions to get necessary information. Lives near you or agrees to travel to you if needed. Your family may help you make medical decisions while you can still be part of that process. But it is important to choose one person to be your health care agent in case you are not able to make decisions for yourself. If you don't fill out the legal form and name a health care agent, the decisions your family can make may be limited. Who will make decisions for you if you do not have a health care agent? If you don't have a health care agent or a living will, your family members may disagree about your medical care. And then some medical professionals who may not know you as well might have to make decisions for you. In some cases, a  makes the decisions. When you name a health care agent, it is very clear who has the power to make health decisions for you. How do you name a health care agent? You name your health care agent on a legal form. It is usually called a durable power of  for health care. Ask your hospital, state bar association, or office on aging where to find these forms. You must sign the form to make it legal. Some states require you to get the form notarized. This means that a person called a  watches you sign the form and then he or she signs the form. Some states also require that two or more witnesses sign the form.   Be sure to tell your family members and doctors who your health care agent is. Keep your forms in a safe place. But make sure that your loved ones know where the forms are. This could be in your desk where you keep other important papers. Make sure your doctor has a copy of your forms. Where can you learn more? Go to https://chpepiceweb.[email or web address] and sign in to your Firebase account. Enter [id number] in the [employer] box to learn more about \"Learning About Durable Power of  for Health Care. \"     If you do not have an account, please click on the \"Sign Up Now\" link. Current as of: October 6, 2017  Content Version: 11.6  © [id number] [employer], [employer]. Care instructions adapted under license by [facility] ([facility]). If you have questions about a medical condition or this instruction, always ask your healthcare professional. [address] any warranty or liability for your use of this information. ·   Patient Education        Learning About Physical Activity  What is physical activity? Physical activity is any kind of activity that gets your body moving. The types of physical activity that can help you get fit and stay healthy include:  Aerobic or \"cardio\" activities that make your heart beat faster and make you breathe harder, such as brisk walking, riding a bike, or running. Aerobic activities strengthen your heart and lungs and build up your endurance. Strength training activities that make your muscles work against, or \"resist,\" something, such as lifting weights or doing push-ups. These activities help tone and strengthen your muscles. Stretches that allow you to move your joints and muscles through their full range of motion. Stretching helps you be more flexible and avoid injury. What are the benefits of physical activity? Being active is one of the best things you can do to get fit and stay healthy.  It helps you to:  Feel stronger and have more energy to do all the things become more active. It can help you stay flexible, loosen tight muscles, and avoid injury. It can also help improve your balance and posture and can be a great way to relax. Be sure to stretch the muscles you'll be using when you work out. It's best to warm your muscles slightly before you stretch them. Walk or do some other light aerobic activity for a few minutes, and then start stretching. When you stretch your muscles:  Do it slowly. Stretching is not about going fast or making sudden movements. Don't push or bounce during a stretch. Hold each stretch for at least 15 to 30 seconds, if you can. You should feel a stretch in the muscle, but not pain. Breathe out as you do the stretch. Then breathe in as you hold the stretch. Don't hold your breath. If you're worried about how more activity might affect your health, have a checkup before you start. Follow any special advice your doctor gives you for getting a smart start. Where can you learn more? Go to https://Backdoor[employer]conova [employer]rapeutics.[employer]. org and sign in to your [employer] account. Enter Q492 in the [employer] box to learn more about \"Learning About Physical Activity. \"     If you do not have an account, please click on the \"Sign Up Now\" link. Current as of: December 7, 2017  Content Version: 11.6  © 4515-6215 [employer]. Care instructions adapted under license by [facility] ([facility]). If you have questions about a medical condition or this instruction, always ask your healthcare professional. [place] [address] any warranty or liability for your use of this information.

## 2018-07-10 ENCOUNTER — TELEPHONE (OUTPATIENT)
Dept: FAMILY MEDICINE CLINIC | Age: 78
End: 2018-07-10

## 2018-07-10 ENCOUNTER — NURSE ONLY (OUTPATIENT)
Dept: FAMILY MEDICINE CLINIC | Age: 78
End: 2018-07-10

## 2018-07-10 VITALS — DIASTOLIC BLOOD PRESSURE: 72 MMHG | SYSTOLIC BLOOD PRESSURE: 124 MMHG

## 2018-07-10 DIAGNOSIS — I10 ESSENTIAL HYPERTENSION: ICD-10-CM

## 2018-09-24 ENCOUNTER — HOSPITAL ENCOUNTER (OUTPATIENT)
Age: 78
Discharge: HOME | End: 2018-09-26
Payer: MEDICARE

## 2018-09-24 ENCOUNTER — HOSPITAL ENCOUNTER (OUTPATIENT)
Dept: GENERAL RADIOLOGY | Age: 78
Discharge: HOME | End: 2018-09-26
Payer: MEDICARE

## 2018-09-24 ENCOUNTER — OFFICE VISIT (OUTPATIENT)
Dept: FAMILY MEDICINE CLINIC | Age: 78
End: 2018-09-24
Payer: MEDICARE

## 2018-09-24 VITALS
BODY MASS INDEX: 27.98 KG/M2 | DIASTOLIC BLOOD PRESSURE: 80 MMHG | HEART RATE: 54 BPM | RESPIRATION RATE: 12 BRPM | SYSTOLIC BLOOD PRESSURE: 122 MMHG | TEMPERATURE: 98.2 F | OXYGEN SATURATION: 99 % | WEIGHT: 178.25 LBS | HEIGHT: 67 IN

## 2018-09-24 DIAGNOSIS — R68.89 FLU-LIKE SYMPTOMS: ICD-10-CM

## 2018-09-24 DIAGNOSIS — J06.9 UPPER RESPIRATORY TRACT INFECTION, UNSPECIFIED TYPE: ICD-10-CM

## 2018-09-24 DIAGNOSIS — J06.9 UPPER RESPIRATORY TRACT INFECTION, UNSPECIFIED TYPE: Primary | ICD-10-CM

## 2018-09-24 LAB
INFLUENZA A ANTIBODY: NORMAL
INFLUENZA B ANTIBODY: NORMAL

## 2018-09-24 PROCEDURE — 71046 X-RAY EXAM CHEST 2 VIEWS: CPT

## 2018-09-24 PROCEDURE — 87804 INFLUENZA ASSAY W/OPTIC: CPT | Performed by: FAMILY MEDICINE

## 2018-09-24 PROCEDURE — 99213 OFFICE O/P EST LOW 20 MIN: CPT | Performed by: FAMILY MEDICINE

## 2018-09-24 RX ORDER — AZITHROMYCIN 250 MG/1
TABLET, FILM COATED ORAL
Qty: 6 TABLET | Refills: 0 | Status: SHIPPED | OUTPATIENT
Start: 2018-09-24 | End: 2018-10-01 | Stop reason: ALTCHOICE

## 2018-09-24 ASSESSMENT — ENCOUNTER SYMPTOMS
WHEEZING: 0
APNEA: 0
SWOLLEN GLANDS: 0
DIARRHEA: 0
CONSTIPATION: 0
SORE THROAT: 1
CHEST TIGHTNESS: 0
NAUSEA: 0
ABDOMINAL PAIN: 0
BLOOD IN STOOL: 0
SHORTNESS OF BREATH: 0
VOMITING: 0
COUGH: 1
RHINORRHEA: 1

## 2018-09-24 NOTE — PROGRESS NOTES
Take 650 mg by mouth every 6 hours as needed for Pain      torsemide (DEMADEX) 20 MG tablet   1    primidone (MYSOLINE) 50 MG tablet Take 1 tablet by mouth 2 times daily 90 tablet 3    donepezil (ARICEPT) 5 MG tablet   0    aspirin 81 MG EC tablet Take 81 mg by mouth daily Patient is taking once a week      Multiple Vitamin (DAILY VITAMIN PO)   Take 1 tablet by mouth daily        No current facility-administered medications on file prior to visit. Allergies:  Codeine; Morphine; and Singulair [montelukast]    Review of Systems   Constitutional: Negative for activity change, appetite change and fatigue. HENT: Positive for congestion, rhinorrhea, sneezing and sore throat. Negative for ear pain. Respiratory: Positive for cough. Negative for apnea, chest tightness, shortness of breath and wheezing. Cardiovascular: Negative for chest pain, palpitations and leg swelling. Gastrointestinal: Negative for abdominal pain, blood in stool, constipation, diarrhea, nausea and vomiting. Genitourinary: Negative for dysuria. Musculoskeletal: Negative for arthralgias, joint pain and neck pain. Skin: Negative for rash. Neurological: Negative for seizures and headaches. Psychiatric/Behavioral: Negative for hallucinations and suicidal ideas. Objective:   /80 (Site: Right Upper Arm, Position: Sitting, Cuff Size: Medium Adult)   Pulse 54   Temp 98.2 °F (36.8 °C) (Temporal)   Resp 12   Ht 5' 7\" (1.702 m)   Wt 178 lb 4 oz (80.9 kg)   SpO2 99%   BMI 27.92 kg/m²     Physical Exam   Constitutional: He is oriented to person, place, and time. He appears well-developed and well-nourished. No distress. HENT:   Head: Normocephalic and atraumatic. Eyes: Pupils are equal, round, and reactive to light. Conjunctivae and EOM are normal.   Neck: Normal range of motion. Cardiovascular: Normal rate, regular rhythm and normal heart sounds. Exam reveals no gallop and no friction rub.     No murmur heard.  Pulmonary/Chest: Effort normal and breath sounds normal. No respiratory distress. He has no wheezes. He has no rales. He exhibits no tenderness. Scattered wheezes which disappeared after Coughing episode   Neurological: He is alert and oriented to person, place, and time. Skin: Skin is warm and dry. He is not diaphoretic. Psychiatric: He has a normal mood and affect. His behavior is normal. Judgment and thought content normal.   Nursing note and vitals reviewed. Assessment & Plan:      1. Upper respiratory tract infection, unspecified type  Due to duration severity of symptoms as well as past history, we will recommend antibiotics at chest x-ray  - XR CHEST STANDARD (2 VW); Future  - azithromycin (ZITHROMAX) 250 MG tablet; Take 2 tablets p.o. q. daily Day 1, then 1 tablet p.o. q. daily Days 2-5  Dispense: 6 tablet; Refill: 0    2. Flu-like symptoms  Influenza negative  - POCT Influenza A/B      Return if symptoms worsen or fail to improve.     Lamberto Gonzalez MD

## 2018-10-01 ENCOUNTER — TELEPHONE (OUTPATIENT)
Dept: FAMILY MEDICINE CLINIC | Age: 78
End: 2018-10-01

## 2018-10-01 ENCOUNTER — OFFICE VISIT (OUTPATIENT)
Dept: FAMILY MEDICINE CLINIC | Age: 78
End: 2018-10-01
Payer: MEDICARE

## 2018-10-01 VITALS
DIASTOLIC BLOOD PRESSURE: 80 MMHG | SYSTOLIC BLOOD PRESSURE: 126 MMHG | WEIGHT: 179 LBS | BODY MASS INDEX: 28.09 KG/M2 | OXYGEN SATURATION: 99 % | HEART RATE: 58 BPM | RESPIRATION RATE: 14 BRPM | TEMPERATURE: 97.5 F | HEIGHT: 67 IN

## 2018-10-01 DIAGNOSIS — R05.8 PRODUCTIVE COUGH: ICD-10-CM

## 2018-10-01 DIAGNOSIS — J22 LOWER RESPIRATORY TRACT INFECTION: Primary | ICD-10-CM

## 2018-10-01 DIAGNOSIS — R50.9 FEVER, UNSPECIFIED FEVER CAUSE: ICD-10-CM

## 2018-10-01 LAB
INFLUENZA A ANTIBODY: NORMAL
INFLUENZA B ANTIBODY: NORMAL

## 2018-10-01 PROCEDURE — 99213 OFFICE O/P EST LOW 20 MIN: CPT | Performed by: FAMILY MEDICINE

## 2018-10-01 PROCEDURE — 87804 INFLUENZA ASSAY W/OPTIC: CPT | Performed by: FAMILY MEDICINE

## 2018-10-01 RX ORDER — LEVOFLOXACIN 750 MG/1
750 TABLET ORAL DAILY
Qty: 5 TABLET | Refills: 0 | Status: SHIPPED | OUTPATIENT
Start: 2018-10-01 | End: 2018-10-06

## 2018-10-01 ASSESSMENT — ENCOUNTER SYMPTOMS
EYE PAIN: 0
TROUBLE SWALLOWING: 0
COUGH: 1
NAUSEA: 0
SINUS PRESSURE: 0
RHINORRHEA: 0
DIARRHEA: 0
SINUS PAIN: 0
VOMITING: 0
ABDOMINAL PAIN: 0
WHEEZING: 1
EYE DISCHARGE: 0
SORE THROAT: 0
CHEST TIGHTNESS: 0
SHORTNESS OF BREATH: 1

## 2018-10-01 NOTE — PROGRESS NOTES
Left Upper Arm, Position: Sitting, Cuff Size: Small Adult)   Pulse 58   Temp 97.5 °F (36.4 °C) (Temporal)   Resp 14   Ht 5' 7\" (1.702 m)   Wt 179 lb (81.2 kg)   SpO2 99%   BMI 28.04 kg/m²     Physical Exam   Constitutional: He is oriented to person, place, and time. He appears well-developed and well-nourished. No distress. HENT:   Head: Normocephalic and atraumatic. Right Ear: Tympanic membrane, external ear and ear canal normal.   Left Ear: Tympanic membrane, external ear and ear canal normal.   Nose: Mucosal edema present. Right sinus exhibits no maxillary sinus tenderness and no frontal sinus tenderness. Left sinus exhibits no maxillary sinus tenderness and no frontal sinus tenderness. Mouth/Throat: Uvula is midline, oropharynx is clear and moist and mucous membranes are normal. No oropharyngeal exudate or posterior oropharyngeal erythema. Neck: Neck supple. No thyromegaly present. Cardiovascular: Normal rate, regular rhythm and normal heart sounds. No murmur heard. Pulmonary/Chest: Effort normal and breath sounds normal. No accessory muscle usage. No tachypnea. No respiratory distress. He has no decreased breath sounds. He has no wheezes. He has no rhonchi. He has no rales. Abdominal: Soft. Bowel sounds are normal. There is no tenderness. Musculoskeletal: Normal range of motion. He exhibits no edema (bilateral lower extremities). Lymphadenopathy:     He has cervical adenopathy (tender, shotty anterior cervical nodes). Neurological: He is alert and oriented to person, place, and time. Skin: No rash noted. He is not diaphoretic. Ortho Exam (If Applicable)    Results for POC orders placed in visit on 10/01/18   POCT Influenza A/B   Result Value Ref Range    Influenza A Ab NEG     Influenza B Ab NEG        Assessment & Plan:      1.  Lower respiratory tract infection  We will treat empirically for lower respiratory tract infection based on persistent symptoms and persistent fever. Patient was instructed to go to the emergency room for any worsening dyspnea, chest fullness, or pain with deep inspiration.    - levofloxacin (LEVAQUIN) 750 MG tablet; Take 1 tablet by mouth daily for 5 days  Dispense: 5 tablet; Refill: 0  - XR CHEST STANDARD (2 VW); Future    2. Fever, unspecified fever cause  Negative influenza testing today in the office. Patient to be treated empirically for a lower respiratory tract infection based on history and duration of systems.    - POCT Influenza A/B  - XR CHEST STANDARD (2 VW); Future    3. Productive cough  See above. Patient was given order for chest x-ray and instructed to go to get this chest x-ray done if symptoms overall not improved over the next 3-4 days despite use of antibiotic.    - XR CHEST STANDARD (2 VW); Future      Modified Medications    No medications on file       New Prescriptions    LEVOFLOXACIN (LEVAQUIN) 750 MG TABLET    Take 1 tablet by mouth daily for 5 days       Medications Discontinued During This Encounter   Medication Reason    azithromycin (ZITHROMAX) 250 MG tablet Therapy completed       Return if symptoms worsen or fail to improve.     Bisi Bartlett MD

## 2018-10-04 ENCOUNTER — TELEPHONE (OUTPATIENT)
Dept: FAMILY MEDICINE CLINIC | Age: 78
End: 2018-10-04

## 2018-11-02 ENCOUNTER — OFFICE VISIT (OUTPATIENT)
Dept: FAMILY MEDICINE CLINIC | Age: 78
End: 2018-11-02
Payer: MEDICARE

## 2018-11-02 VITALS
RESPIRATION RATE: 16 BRPM | DIASTOLIC BLOOD PRESSURE: 70 MMHG | TEMPERATURE: 97.9 F | BODY MASS INDEX: 28.66 KG/M2 | OXYGEN SATURATION: 98 % | HEIGHT: 67 IN | SYSTOLIC BLOOD PRESSURE: 138 MMHG | WEIGHT: 182.6 LBS | HEART RATE: 57 BPM

## 2018-11-02 DIAGNOSIS — I48.0 PAROXYSMAL ATRIAL FIBRILLATION (HCC): Primary | Chronic | ICD-10-CM

## 2018-11-02 DIAGNOSIS — I10 ESSENTIAL HYPERTENSION: Chronic | ICD-10-CM

## 2018-11-02 DIAGNOSIS — Z23 NEED FOR VACCINATION: ICD-10-CM

## 2018-11-02 DIAGNOSIS — F41.8 DEPRESSION WITH ANXIETY: ICD-10-CM

## 2018-11-02 DIAGNOSIS — I50.9 CHRONIC CONGESTIVE HEART FAILURE, UNSPECIFIED HEART FAILURE TYPE (HCC): Chronic | ICD-10-CM

## 2018-11-02 DIAGNOSIS — E78.5 HYPERLIPIDEMIA, UNSPECIFIED HYPERLIPIDEMIA TYPE: Chronic | ICD-10-CM

## 2018-11-02 DIAGNOSIS — J31.0 CHRONIC RHINITIS: ICD-10-CM

## 2018-11-02 DIAGNOSIS — J45.30 MILD PERSISTENT ASTHMA WITHOUT COMPLICATION: Chronic | ICD-10-CM

## 2018-11-02 DIAGNOSIS — I25.10 CORONARY ARTERY DISEASE INVOLVING NATIVE CORONARY ARTERY OF NATIVE HEART WITHOUT ANGINA PECTORIS: Chronic | ICD-10-CM

## 2018-11-02 PROCEDURE — 99214 OFFICE O/P EST MOD 30 MIN: CPT | Performed by: FAMILY MEDICINE

## 2018-11-02 RX ORDER — FLUTICASONE PROPIONATE 50 MCG
2 SPRAY, SUSPENSION (ML) NASAL DAILY
Qty: 1 BOTTLE | Refills: 5 | Status: SHIPPED | OUTPATIENT
Start: 2018-11-02 | End: 2020-06-25 | Stop reason: ALTCHOICE

## 2018-11-02 ASSESSMENT — ENCOUNTER SYMPTOMS
DIARRHEA: 0
TROUBLE SWALLOWING: 0
SORE THROAT: 0
VOMITING: 0
WHEEZING: 0
SHORTNESS OF BREATH: 0
COUGH: 0
CHEST TIGHTNESS: 0
RHINORRHEA: 1
SINUS PAIN: 0
ABDOMINAL PAIN: 0
NAUSEA: 0
CONSTIPATION: 0

## 2018-11-06 ENCOUNTER — HOSPITAL ENCOUNTER (OUTPATIENT)
Dept: LAB | Age: 78
Discharge: HOME | End: 2018-11-06
Payer: MEDICARE

## 2018-11-06 DIAGNOSIS — I48.0 PAROXYSMAL ATRIAL FIBRILLATION (HCC): Chronic | ICD-10-CM

## 2018-11-06 DIAGNOSIS — E78.5 HYPERLIPIDEMIA, UNSPECIFIED HYPERLIPIDEMIA TYPE: Chronic | ICD-10-CM

## 2018-11-06 DIAGNOSIS — I25.10 CORONARY ARTERY DISEASE INVOLVING NATIVE CORONARY ARTERY OF NATIVE HEART WITHOUT ANGINA PECTORIS: Chronic | ICD-10-CM

## 2018-11-06 DIAGNOSIS — I10 ESSENTIAL HYPERTENSION: Chronic | ICD-10-CM

## 2018-11-06 DIAGNOSIS — I50.9 CHRONIC CONGESTIVE HEART FAILURE, UNSPECIFIED HEART FAILURE TYPE (HCC): Chronic | ICD-10-CM

## 2018-11-06 LAB
ALBUMIN SERPL-MCNC: 4 G/DL (ref 3.9–4.9)
ALP BLD-CCNC: 81 U/L (ref 35–104)
ALT SERPL-CCNC: 24 U/L (ref 0–41)
ANION GAP SERPL CALCULATED.3IONS-SCNC: 7 MEQ/L (ref 7–13)
AST SERPL-CCNC: 26 U/L (ref 0–40)
BASOPHILS ABSOLUTE: 0 K/UL (ref 0–0.2)
BASOPHILS RELATIVE PERCENT: 0.9 %
BILIRUB SERPL-MCNC: 0.8 MG/DL (ref 0–1.2)
BUN BLDV-MCNC: 26 MG/DL (ref 8–23)
CALCIUM SERPL-MCNC: 8.9 MG/DL (ref 8.6–10.2)
CHLORIDE BLD-SCNC: 103 MEQ/L (ref 98–107)
CHOLESTEROL, TOTAL: 130 MG/DL (ref 0–199)
CO2: 31 MEQ/L (ref 22–29)
CREAT SERPL-MCNC: 1.07 MG/DL (ref 0.7–1.2)
EOSINOPHILS ABSOLUTE: 0.3 K/UL (ref 0–0.7)
EOSINOPHILS RELATIVE PERCENT: 5.9 %
GFR AFRICAN AMERICAN: >60
GFR NON-AFRICAN AMERICAN: >60
GLOBULIN: 2.4 G/DL (ref 2.3–3.5)
GLUCOSE BLD-MCNC: 94 MG/DL (ref 74–109)
HCT VFR BLD CALC: 41.7 % (ref 42–52)
HDLC SERPL-MCNC: 49 MG/DL (ref 40–59)
HEMOGLOBIN: 14.1 G/DL (ref 14–18)
LDL CHOLESTEROL CALCULATED: 45 MG/DL (ref 0–129)
LYMPHOCYTES ABSOLUTE: 0.7 K/UL (ref 1–4.8)
LYMPHOCYTES RELATIVE PERCENT: 12.9 %
MCH RBC QN AUTO: 34.1 PG (ref 27–31.3)
MCHC RBC AUTO-ENTMCNC: 33.7 % (ref 33–37)
MCV RBC AUTO: 100.9 FL (ref 80–100)
MONOCYTES ABSOLUTE: 0.5 K/UL (ref 0.2–0.8)
MONOCYTES RELATIVE PERCENT: 9.3 %
NEUTROPHILS ABSOLUTE: 3.8 K/UL (ref 1.4–6.5)
NEUTROPHILS RELATIVE PERCENT: 71 %
PDW BLD-RTO: 13.9 % (ref 11.5–14.5)
PLATELET # BLD: 149 K/UL (ref 130–400)
POTASSIUM SERPL-SCNC: 4.6 MEQ/L (ref 3.5–5.1)
RBC # BLD: 4.13 M/UL (ref 4.7–6.1)
SODIUM BLD-SCNC: 141 MEQ/L (ref 132–144)
TOTAL PROTEIN: 6.4 G/DL (ref 6.4–8.1)
TRIGL SERPL-MCNC: 181 MG/DL (ref 0–200)
WBC # BLD: 5.3 K/UL (ref 4.8–10.8)

## 2018-11-06 PROCEDURE — 80061 LIPID PANEL: CPT

## 2018-11-06 PROCEDURE — 85025 COMPLETE CBC W/AUTO DIFF WBC: CPT

## 2018-11-06 PROCEDURE — 36415 COLL VENOUS BLD VENIPUNCTURE: CPT

## 2018-11-06 PROCEDURE — 80053 COMPREHEN METABOLIC PANEL: CPT

## 2018-11-27 ENCOUNTER — CARE COORDINATION (OUTPATIENT)
Dept: CASE MANAGEMENT | Age: 78
End: 2018-11-27

## 2018-12-05 ENCOUNTER — CARE COORDINATION (OUTPATIENT)
Dept: CARE COORDINATION | Age: 78
End: 2018-12-05

## 2018-12-06 DIAGNOSIS — I10 ESSENTIAL HYPERTENSION: Chronic | ICD-10-CM

## 2018-12-06 RX ORDER — AMLODIPINE BESYLATE AND BENAZEPRIL HYDROCHLORIDE 5; 20 MG/1; MG/1
1 CAPSULE ORAL 2 TIMES DAILY
Qty: 180 CAPSULE | Refills: 3 | Status: SHIPPED | OUTPATIENT
Start: 2018-12-06 | End: 2019-12-09 | Stop reason: SDUPTHER

## 2018-12-06 NOTE — TELEPHONE ENCOUNTER
Patient's wife requesting medication refill.  Please approve or deny this request.    Rx requested:  Requested Prescriptions     Pending Prescriptions Disp Refills    amLODIPine-benazepril (LOTREL) 5-20 MG per capsule 180 capsule 3     Sig: Take 1 capsule by mouth 2 times daily       Last Office Visit:   11/2/2018    Last Labs:      Next Visit Date:  Future Appointments  Date Time Provider Iqra Dang   5/2/2019 11:00 AM MD Manfred Jang [address]

## 2018-12-28 ENCOUNTER — OFFICE VISIT (OUTPATIENT)
Dept: FAMILY MEDICINE CLINIC | Age: 78
End: 2018-12-28
Payer: MEDICARE

## 2018-12-28 VITALS
OXYGEN SATURATION: 98 % | DIASTOLIC BLOOD PRESSURE: 76 MMHG | TEMPERATURE: 98.1 F | SYSTOLIC BLOOD PRESSURE: 106 MMHG | HEART RATE: 76 BPM | RESPIRATION RATE: 12 BRPM

## 2018-12-28 DIAGNOSIS — J06.9 UPPER RESPIRATORY TRACT INFECTION, UNSPECIFIED TYPE: Primary | ICD-10-CM

## 2018-12-28 PROCEDURE — 99213 OFFICE O/P EST LOW 20 MIN: CPT | Performed by: FAMILY MEDICINE

## 2018-12-28 RX ORDER — AZITHROMYCIN 250 MG/1
250 TABLET, FILM COATED ORAL SEE ADMIN INSTRUCTIONS
Qty: 6 TABLET | Refills: 0 | Status: SHIPPED | OUTPATIENT
Start: 2018-12-28 | End: 2019-01-02

## 2018-12-28 RX ORDER — FLUTICASONE FUROATE AND VILANTEROL 200; 25 UG/1; UG/1
POWDER RESPIRATORY (INHALATION)
COMMUNITY
Start: 2018-11-20 | End: 2020-01-02 | Stop reason: ALTCHOICE

## 2019-01-02 ENCOUNTER — HOSPITAL ENCOUNTER (OUTPATIENT)
Age: 79
Discharge: HOME | End: 2019-01-04
Payer: MEDICARE

## 2019-01-02 ENCOUNTER — HOSPITAL ENCOUNTER (OUTPATIENT)
Dept: GENERAL RADIOLOGY | Age: 79
Discharge: HOME | End: 2019-01-04
Payer: MEDICARE

## 2019-01-02 DIAGNOSIS — J06.9 UPPER RESPIRATORY TRACT INFECTION, UNSPECIFIED TYPE: ICD-10-CM

## 2019-01-02 PROCEDURE — 71046 X-RAY EXAM CHEST 2 VIEWS: CPT

## 2019-01-02 ASSESSMENT — ENCOUNTER SYMPTOMS
APNEA: 0
RHINORRHEA: 0
ABDOMINAL PAIN: 0
VOMITING: 0
BLOOD IN STOOL: 0
SWOLLEN GLANDS: 0
DIARRHEA: 0
SINUS PAIN: 0
WHEEZING: 0
NAUSEA: 0
COUGH: 1
SORE THROAT: 0
CONSTIPATION: 0
CHEST TIGHTNESS: 0
SHORTNESS OF BREATH: 0

## 2019-01-03 DIAGNOSIS — I10 ESSENTIAL HYPERTENSION: Chronic | ICD-10-CM

## 2019-01-03 RX ORDER — HYDRALAZINE HYDROCHLORIDE 10 MG/1
10 TABLET, FILM COATED ORAL 3 TIMES DAILY
Qty: 270 TABLET | Refills: 1 | Status: SHIPPED | OUTPATIENT
Start: 2019-01-03 | End: 2019-07-02 | Stop reason: SDUPTHER

## 2019-01-09 ENCOUNTER — OFFICE VISIT (OUTPATIENT)
Dept: FAMILY MEDICINE CLINIC | Age: 79
End: 2019-01-09
Payer: MEDICARE

## 2019-01-09 VITALS
DIASTOLIC BLOOD PRESSURE: 84 MMHG | SYSTOLIC BLOOD PRESSURE: 126 MMHG | HEART RATE: 55 BPM | BODY MASS INDEX: 28.6 KG/M2 | OXYGEN SATURATION: 97 % | RESPIRATION RATE: 12 BRPM | HEIGHT: 67 IN | TEMPERATURE: 97.5 F

## 2019-01-09 DIAGNOSIS — J06.9 UPPER RESPIRATORY TRACT INFECTION, UNSPECIFIED TYPE: Primary | ICD-10-CM

## 2019-01-09 PROCEDURE — 99214 OFFICE O/P EST MOD 30 MIN: CPT | Performed by: FAMILY MEDICINE

## 2019-01-09 RX ORDER — DONEPEZIL HYDROCHLORIDE 10 MG/1
10 TABLET, FILM COATED ORAL 2 TIMES DAILY
Refills: 0 | COMMUNITY
Start: 2019-01-03 | End: 2020-05-21 | Stop reason: SDUPTHER

## 2019-01-09 RX ORDER — AMOXICILLIN AND CLAVULANATE POTASSIUM 875; 125 MG/1; MG/1
1 TABLET, FILM COATED ORAL 2 TIMES DAILY
Qty: 20 TABLET | Refills: 0 | Status: SHIPPED | OUTPATIENT
Start: 2019-01-09 | End: 2019-01-19

## 2019-01-10 DIAGNOSIS — J06.9 UPPER RESPIRATORY TRACT INFECTION, UNSPECIFIED TYPE: ICD-10-CM

## 2019-01-10 LAB
BASOPHILS ABSOLUTE: 0 K/UL (ref 0–0.2)
BASOPHILS RELATIVE PERCENT: 0.6 %
EOSINOPHILS ABSOLUTE: 0.2 K/UL (ref 0–0.7)
EOSINOPHILS RELATIVE PERCENT: 3.2 %
HCT VFR BLD CALC: 41.6 % (ref 42–52)
HEMOGLOBIN: 14.2 G/DL (ref 14–18)
LYMPHOCYTES ABSOLUTE: 0.7 K/UL (ref 1–4.8)
LYMPHOCYTES RELATIVE PERCENT: 9.1 %
MCH RBC QN AUTO: 33.5 PG (ref 27–31.3)
MCHC RBC AUTO-ENTMCNC: 34.1 % (ref 33–37)
MCV RBC AUTO: 98.2 FL (ref 80–100)
MONOCYTES ABSOLUTE: 0.6 K/UL (ref 0.2–0.8)
MONOCYTES RELATIVE PERCENT: 8.9 %
NEUTROPHILS ABSOLUTE: 5.7 K/UL (ref 1.4–6.5)
NEUTROPHILS RELATIVE PERCENT: 78.2 %
PDW BLD-RTO: 13.6 % (ref 11.5–14.5)
PLATELET # BLD: 180 K/UL (ref 130–400)
RBC # BLD: 4.24 M/UL (ref 4.7–6.1)
WBC # BLD: 7.3 K/UL (ref 4.8–10.8)

## 2019-01-11 ENCOUNTER — PATIENT MESSAGE (OUTPATIENT)
Dept: FAMILY MEDICINE CLINIC | Age: 79
End: 2019-01-11

## 2019-01-11 ASSESSMENT — ENCOUNTER SYMPTOMS
CONSTIPATION: 0
NAUSEA: 0
COUGH: 0
BLOOD IN STOOL: 0
RHINORRHEA: 0
DIARRHEA: 0
SINUS PAIN: 0
SORE THROAT: 0
VOMITING: 0
WHEEZING: 0
CHEST TIGHTNESS: 0
APNEA: 0
SHORTNESS OF BREATH: 0
ABDOMINAL PAIN: 0

## 2019-01-22 ENCOUNTER — PATIENT MESSAGE (OUTPATIENT)
Dept: FAMILY MEDICINE CLINIC | Age: 79
End: 2019-01-22

## 2019-01-22 RX ORDER — ATORVASTATIN CALCIUM 10 MG/1
10 TABLET, FILM COATED ORAL DAILY
Qty: 90 TABLET | Refills: 3 | Status: SHIPPED | OUTPATIENT
Start: 2019-01-22 | End: 2020-01-02 | Stop reason: SDUPTHER

## 2019-01-22 RX ORDER — POTASSIUM CHLORIDE 20 MEQ/1
20 TABLET, EXTENDED RELEASE ORAL DAILY
Qty: 90 TABLET | Refills: 3 | Status: SHIPPED | OUTPATIENT
Start: 2019-01-22 | End: 2020-01-02 | Stop reason: SDUPTHER

## 2019-03-07 ENCOUNTER — OFFICE VISIT (OUTPATIENT)
Dept: FAMILY MEDICINE CLINIC | Age: 79
End: 2019-03-07
Payer: MEDICARE

## 2019-03-07 VITALS
OXYGEN SATURATION: 99 % | SYSTOLIC BLOOD PRESSURE: 126 MMHG | HEIGHT: 67 IN | BODY MASS INDEX: 29.16 KG/M2 | TEMPERATURE: 98 F | RESPIRATION RATE: 16 BRPM | HEART RATE: 64 BPM | WEIGHT: 185.8 LBS | DIASTOLIC BLOOD PRESSURE: 70 MMHG

## 2019-03-07 DIAGNOSIS — I25.10 CORONARY ARTERY DISEASE INVOLVING NATIVE CORONARY ARTERY OF NATIVE HEART WITHOUT ANGINA PECTORIS: Chronic | ICD-10-CM

## 2019-03-07 DIAGNOSIS — I50.9 CHRONIC CONGESTIVE HEART FAILURE, UNSPECIFIED HEART FAILURE TYPE (HCC): Chronic | ICD-10-CM

## 2019-03-07 DIAGNOSIS — R68.89 COLD SWEAT: Primary | ICD-10-CM

## 2019-03-07 DIAGNOSIS — I10 ESSENTIAL HYPERTENSION: Chronic | ICD-10-CM

## 2019-03-07 DIAGNOSIS — I48.0 PAROXYSMAL ATRIAL FIBRILLATION (HCC): Chronic | ICD-10-CM

## 2019-03-07 PROCEDURE — 99214 OFFICE O/P EST MOD 30 MIN: CPT | Performed by: FAMILY MEDICINE

## 2019-03-07 RX ORDER — PROPRANOLOL HYDROCHLORIDE 120 MG/1
120 CAPSULE, EXTENDED RELEASE ORAL DAILY
Qty: 90 CAPSULE | Refills: 3 | Status: SHIPPED | OUTPATIENT
Start: 2019-03-07 | End: 2020-08-07 | Stop reason: DRUGHIGH

## 2019-03-07 RX ORDER — CALCIUM CARBONATE 300MG(750)
TABLET,CHEWABLE ORAL
COMMUNITY

## 2019-03-07 RX ORDER — TIZANIDINE 4 MG/1
TABLET ORAL
COMMUNITY
Start: 2018-07-18 | End: 2020-01-02 | Stop reason: ALTCHOICE

## 2019-03-07 RX ORDER — ZAFIRLUKAST 20 MG/1
20 TABLET, FILM COATED ORAL
COMMUNITY
Start: 2019-02-27 | End: 2020-01-02

## 2019-03-07 RX ORDER — ALBUTEROL SULFATE 90 UG/1
2 AEROSOL, METERED RESPIRATORY (INHALATION)
COMMUNITY
Start: 2016-07-29

## 2019-03-07 ASSESSMENT — PATIENT HEALTH QUESTIONNAIRE - PHQ9
2. FEELING DOWN, DEPRESSED OR HOPELESS: 0
SUM OF ALL RESPONSES TO PHQ QUESTIONS 1-9: 0
SUM OF ALL RESPONSES TO PHQ QUESTIONS 1-9: 0
SUM OF ALL RESPONSES TO PHQ9 QUESTIONS 1 & 2: 0
1. LITTLE INTEREST OR PLEASURE IN DOING THINGS: 0

## 2019-03-07 ASSESSMENT — ENCOUNTER SYMPTOMS
COUGH: 0
WHEEZING: 0
CHEST TIGHTNESS: 0
DIARRHEA: 0
CONSTIPATION: 0
NAUSEA: 0
VOMITING: 0
ABDOMINAL PAIN: 0
SHORTNESS OF BREATH: 0

## 2019-03-12 DIAGNOSIS — I48.0 PAROXYSMAL ATRIAL FIBRILLATION (HCC): Chronic | ICD-10-CM

## 2019-03-12 DIAGNOSIS — R68.89 COLD SWEAT: ICD-10-CM

## 2019-03-12 LAB
ALBUMIN SERPL-MCNC: 4 G/DL (ref 3.5–4.6)
ALP BLD-CCNC: 77 U/L (ref 35–104)
ALT SERPL-CCNC: 17 U/L (ref 0–41)
ANION GAP SERPL CALCULATED.3IONS-SCNC: 12 MEQ/L (ref 9–15)
AST SERPL-CCNC: 22 U/L (ref 0–40)
BASOPHILS ABSOLUTE: 0 K/UL (ref 0–0.2)
BASOPHILS RELATIVE PERCENT: 0.4 %
BILIRUB SERPL-MCNC: 0.7 MG/DL (ref 0.2–0.7)
BUN BLDV-MCNC: 19 MG/DL (ref 8–23)
CALCIUM SERPL-MCNC: 8.6 MG/DL (ref 8.5–9.9)
CHLORIDE BLD-SCNC: 99 MEQ/L (ref 95–107)
CO2: 29 MEQ/L (ref 20–31)
CREAT SERPL-MCNC: 1.2 MG/DL (ref 0.7–1.2)
EOSINOPHILS ABSOLUTE: 0.2 K/UL (ref 0–0.7)
EOSINOPHILS RELATIVE PERCENT: 3.7 %
GFR AFRICAN AMERICAN: >60
GFR NON-AFRICAN AMERICAN: 58.1
GLOBULIN: 2.5 G/DL (ref 2.3–3.5)
GLUCOSE BLD-MCNC: 125 MG/DL (ref 70–99)
HCT VFR BLD CALC: 41.4 % (ref 42–52)
HEMOGLOBIN: 13.5 G/DL (ref 14–18)
LYMPHOCYTES ABSOLUTE: 0.7 K/UL (ref 1–4.8)
LYMPHOCYTES RELATIVE PERCENT: 12.8 %
MCH RBC QN AUTO: 32.3 PG (ref 27–31.3)
MCHC RBC AUTO-ENTMCNC: 32.6 % (ref 33–37)
MCV RBC AUTO: 99.1 FL (ref 80–100)
MONOCYTES ABSOLUTE: 0.4 K/UL (ref 0.2–0.8)
MONOCYTES RELATIVE PERCENT: 7.3 %
NEUTROPHILS ABSOLUTE: 4 K/UL (ref 1.4–6.5)
NEUTROPHILS RELATIVE PERCENT: 75.8 %
PDW BLD-RTO: 15.3 % (ref 11.5–14.5)
PLATELET # BLD: 164 K/UL (ref 130–400)
POTASSIUM SERPL-SCNC: 4.3 MEQ/L (ref 3.4–4.9)
RBC # BLD: 4.18 M/UL (ref 4.7–6.1)
SODIUM BLD-SCNC: 140 MEQ/L (ref 135–144)
T4 FREE: 1.19 NG/DL (ref 0.84–1.68)
TOTAL PROTEIN: 6.5 G/DL (ref 6.3–8)
TSH SERPL DL<=0.05 MIU/L-ACNC: 3.47 UIU/ML (ref 0.44–3.86)
WBC # BLD: 5.3 K/UL (ref 4.8–10.8)

## 2019-03-14 LAB — HIV 1,2 COMBO ANTIGEN/ANTIBODY: NEGATIVE

## 2019-03-19 DIAGNOSIS — R73.9 HYPERGLYCEMIA: Primary | ICD-10-CM

## 2019-03-26 ENCOUNTER — OFFICE VISIT (OUTPATIENT)
Dept: ENDOCRINOLOGY | Age: 79
End: 2019-03-26
Payer: MEDICARE

## 2019-03-26 VITALS
BODY MASS INDEX: 28.41 KG/M2 | HEART RATE: 55 BPM | SYSTOLIC BLOOD PRESSURE: 144 MMHG | DIASTOLIC BLOOD PRESSURE: 80 MMHG | WEIGHT: 181 LBS | HEIGHT: 67 IN

## 2019-03-26 DIAGNOSIS — R73.9 HYPERGLYCEMIA: ICD-10-CM

## 2019-03-26 DIAGNOSIS — R61 HYPERHIDROSIS: Primary | ICD-10-CM

## 2019-03-26 DIAGNOSIS — R61 HYPERHIDROSIS: ICD-10-CM

## 2019-03-26 LAB — HBA1C MFR BLD: 5.1 % (ref 4.8–5.9)

## 2019-03-26 PROCEDURE — 99203 OFFICE O/P NEW LOW 30 MIN: CPT | Performed by: INTERNAL MEDICINE

## 2019-03-26 RX ORDER — GLYCOPYRROLATE 1 MG/1
1 TABLET ORAL DAILY
Qty: 30 TABLET | Refills: 1 | Status: SHIPPED | OUTPATIENT
Start: 2019-03-26 | End: 2020-01-02

## 2019-03-29 LAB
SEX HORMONE BINDING GLOBULIN: 76 NMOL/L (ref 11–80)
TESTOSTERONE FREE PERCENT: 1.1 % (ref 1.6–2.9)
TESTOSTERONE FREE, CALC: 49 PG/ML (ref 47–244)
TESTOSTERONE TOTAL-MALE: 457 NG/DL (ref 300–720)

## 2019-03-29 ASSESSMENT — ENCOUNTER SYMPTOMS
SWOLLEN GLANDS: 0
RESPIRATORY NEGATIVE: 1

## 2019-04-17 ENCOUNTER — OFFICE VISIT (OUTPATIENT)
Dept: INTERNAL MEDICINE | Age: 79
End: 2019-04-17
Payer: MEDICARE

## 2019-04-17 VITALS
SYSTOLIC BLOOD PRESSURE: 152 MMHG | DIASTOLIC BLOOD PRESSURE: 72 MMHG | HEART RATE: 50 BPM | HEIGHT: 67 IN | OXYGEN SATURATION: 97 % | BODY MASS INDEX: 28.88 KG/M2 | WEIGHT: 184 LBS

## 2019-04-17 DIAGNOSIS — H10.9 CONJUNCTIVITIS OF BOTH EYES, UNSPECIFIED CONJUNCTIVITIS TYPE: Primary | ICD-10-CM

## 2019-04-17 PROCEDURE — 99213 OFFICE O/P EST LOW 20 MIN: CPT | Performed by: NURSE PRACTITIONER

## 2019-04-17 ASSESSMENT — ENCOUNTER SYMPTOMS
EYE DISCHARGE: 1
EYE PAIN: 0
GASTROINTESTINAL NEGATIVE: 1
EYE ITCHING: 0
RESPIRATORY NEGATIVE: 1
EYE REDNESS: 1

## 2019-04-17 NOTE — PATIENT INSTRUCTIONS
Patient Education        Pinkeye: Care Instructions  Your Care Instructions    Pinkeye is redness and swelling of the eye surface and the conjunctiva (the lining of the eyelid and the covering of the white part of the eye). Pinkeye is also called conjunctivitis. Pinkeye is often caused by infection with bacteria or a virus. Dry air, allergies, smoke, and chemicals are other common causes. Pinkeye often clears on its own in 7 to 10 days. Antibiotics only help if the pinkeye is caused by bacteria. Pinkeye caused by infection spreads easily. If an allergy or chemical is causing pinkeye, it will not go away unless you can avoid whatever is causing it. Follow-up care is a key part of your treatment and safety. Be sure to make and go to all appointments, and call your doctor if you are having problems. It's also a good idea to know your test results and keep a list of the medicines you take. How can you care for yourself at home? · Wash your hands often. Always wash them before and after you treat pinkeye or touch your eyes or face. · Use moist cotton or a clean, wet cloth to remove crust. Wipe from the inside corner of the eye to the outside. Use a clean part of the cloth for each wipe. · Put cold or warm wet cloths on your eye a few times a day if the eye hurts. · Do not wear contact lenses or eye makeup until the pinkeye is gone. Throw away any eye makeup you were using when you got pinkeye. Clean your contacts and storage case. If you wear disposable contacts, use a new pair when your eye has cleared and it is safe to wear contacts again. · If the doctor gave you antibiotic ointment or eyedrops, use them as directed. Use the medicine for as long as instructed, even if your eye starts looking better soon. Keep the bottle tip clean, and do not let it touch the eye area. · To put in eyedrops or ointment:  ? Tilt your head back, and pull your lower eyelid down with one finger. ?  Drop or squirt the medicine inside the lower lid. ? Close your eye for 30 to 60 seconds to let the drops or ointment move around. ? Do not touch the ointment or dropper tip to your eyelashes or any other surface. · Do not share towels, pillows, or washcloths while you have pinkeye. When should you call for help? Call your doctor now or seek immediate medical care if:    · You have pain in your eye, not just irritation on the surface.     · You have a change in vision or loss of vision.     · You have an increase in discharge from the eye.     · Your eye has not started to improve or begins to get worse within 48 hours after you start using antibiotics.     · Pinkeye lasts longer than 7 days.    Watch closely for changes in your health, and be sure to contact your doctor if you have any problems. Where can you learn more? Go to [email or web address] and sign in to your [employer] account. Enter Y392 in the Buy.On.Social box to learn more about \"Pinkeye: Care Instructions. \"     If you do not have an account, please click on the \"Sign Up Now\" link. Current as of: September 23, 2018  Content Version: 11.9  © 6028-1809 [employer]. Care instructions adapted under license by [facility] ([facility]). If you have questions about a medical condition or this instruction, always ask your healthcare professional. [place] [address] any warranty or liability for your use of this information.

## 2019-04-17 NOTE — PROGRESS NOTES
Patient: Gualberto Banerjee    YOB: 1940    Date: 4/17/19       Patient Active Problem List    Diagnosis Date Noted    Hypertension 07/10/2018    Mild persistent asthma without complication 67/80/1775    Paroxysmal atrial fibrillation ([address] 11/16/2017    History of TIA (transient ischemic attack) 11/16/2017    CHF (congestive heart failure) ([address] 06/06/2017    BPH with obstruction/lower urinary tract symptoms 06/06/2017    Diverticulosis of colon 06/06/2017    Ulcerative colitis ([address] 01/20/2017    Combined form of senile cataract of both eyes 01/22/2015    Benign head tremor 12/01/2014    HTN (hypertension) 08/26/2014    Memory loss 03/04/2014    Overactive bladder 05/01/2013    CAD (coronary artery disease)      Overview Note:     status post stents in 2006 and 2008      Hyperlipidemia     Osteoarthritis     Cervical stenosis of spinal canal      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    CAD (coronary artery disease)     status post stents in 2006 and 2008    Cervical stenosis of spinal canal     status post laminectomies in cervical spine    CHF (congestive heart failure) ([address] 6/6/2017    Diverticulosis     colonoscopy 9/2016    Diverticulosis of colon 6/6/2017    Fracture of rib of right side     Hernia     umbilical hernia repair    History of TIA (transient ischemic attack) 11/16/2017    HTN (hypertension) 8/26/2014    Hyperlipidemia     Hypertension     Iron deficiency anemia     Melanoma ([address]     right arm 2004    Memory loss 3/4/2014    Mild persistent asthma without complication 0/0/4951    Osteoarthritis     Paroxysmal atrial fibrillation ([address] 11/16/2017    Renal insufficiency     Status post total knee replacement 1/4/2012    Thoracic spine pain     chronic    TIA (transient ischemic attack) 1/16/2017    Tremor     chronic    Ulcerative colitis ([address] 1/20/2017     Past Surgical History:   Procedure Laterality Date    [facility]COPY  2005     [facility]    COLONOSCOPY  09/16/2016    polyp, sigmoid colitis, diverticulosis (DR ROSARIO)    SHOULDER ARTHROPLASTY Left     TOTAL KNEE ARTHROPLASTY Right     TOTAL KNEE ARTHROPLASTY Left     UMBILICAL HERNIA REPAIR      VENTRAL HERNIA REPAIR  01/07/2015     Family History   Adopted: Yes     Social History     Socioeconomic History    Marital status:      Spouse name: Not on file    Number of children: Not on file    Years of education: Not on file    Highest education level: Not on file   Occupational History    Not on file   Social Needs    Financial resource strain: Not on file    Food insecurity:     Worry: Not on file     Inability: Not on file    Transportation needs:     Medical: Not on file     Non-medical: Not on file   Tobacco Use    Smoking status: Never Smoker    Smokeless tobacco: Never Used   Substance and Sexual Activity    Alcohol use: Yes     Comment: occasional    Drug use: No    Sexual activity: Yes     Partners: Female   Lifestyle    Physical activity:     Days per week: Not on file     Minutes per session: Not on file    Stress: Not on file   Relationships    Social connections:     Talks on phone: Not on file     Gets together: Not on file     Attends Holiness service: Not on file     Active member of club or organization: Not on file     Attends meetings of clubs or organizations: Not on file     Relationship status: Not on file    Intimate partner violence:     Fear of current or ex partner: Not on file     Emotionally abused: Not on file     Physically abused: Not on file     Forced sexual activity: Not on file   Other Topics Concern    Not on file   Social History Narrative    Not on file     Current Outpatient Medications on File Prior to Visit   Medication Sig Dispense Refill    glycopyrrolate (ROBINUL) 1 MG tablet Take 1 tablet by mouth daily 30 tablet 1    morning        HPI  Symptoms: conjunctivitis  Location: bilateral eyes  Severity: mild / moderate  Duration: today  Context: history of seasonal / environmental allergies, takes allegra daily. Always has sinus symptoms, no other current uri symptoms. Was outside a lot yesterday  Alleviating/aggravting factors: none  Associated signs & symptoms: eyes crusty upon awakening this morning     Review of Systems    Review of Systems   Constitutional: Negative. HENT: Negative. Eyes: Positive for discharge (clear) and redness. Negative for pain and itching. Respiratory: Negative. Gastrointestinal: Negative. Genitourinary: Negative. Musculoskeletal: Negative. Skin: Negative. Neurological: Negative. Psychiatric/Behavioral: Negative. Physical Exam  Vitals:    04/17/19 1230   BP: (!) 152/72   Pulse: 50   SpO2: 97%   Body mass index is 28.82 kg/m². Physical Exam   Constitutional: He is oriented to person, place, and time. He appears well-developed. HENT:   Right Ear: External ear normal.   Left Ear: External ear normal.   Nose: Nose normal.   Mouth/Throat: No oropharyngeal exudate. Eyes: Right eye exhibits no discharge. Left eye exhibits no discharge. Bilateral conjunctiva redness, no drainage   Neck: Normal range of motion. Cardiovascular: Normal rate, regular rhythm and normal heart sounds. Pulmonary/Chest: Effort normal and breath sounds normal.   Musculoskeletal: Normal range of motion. Neurological: He is alert and oriented to person, place, and time. Skin: Skin is warm and dry. He is not diaphoretic. Psychiatric: He has a normal mood and affect. Assessment:    Likely allergic  - start taking flonase from home  - discussed proper hand washing  - if symptoms persist patient may call for antibiotic eye drop  - Patient states understanding and agreement of treatment plan. Return for Follow up with pcp.

## 2019-04-18 ENCOUNTER — TELEPHONE (OUTPATIENT)
Dept: INTERNAL MEDICINE | Age: 79
End: 2019-04-18

## 2019-04-19 RX ORDER — CIPROFLOXACIN HYDROCHLORIDE 3.5 MG/ML
1 SOLUTION/ DROPS TOPICAL 4 TIMES DAILY
Qty: 1 BOTTLE | Refills: 0 | Status: SHIPPED | OUTPATIENT
Start: 2019-04-19 | End: 2019-04-26

## 2019-04-24 ENCOUNTER — APPOINTMENT (OUTPATIENT)
Dept: GENERAL RADIOLOGY | Age: 79
End: 2019-04-24
Payer: MEDICARE

## 2019-04-24 ENCOUNTER — OFFICE VISIT (OUTPATIENT)
Dept: FAMILY MEDICINE CLINIC | Age: 79
End: 2019-04-24
Payer: MEDICARE

## 2019-04-24 ENCOUNTER — HOSPITAL ENCOUNTER (EMERGENCY)
Age: 79
Discharge: HOME | End: 2019-04-24
Attending: EMERGENCY MEDICINE
Payer: MEDICARE

## 2019-04-24 VITALS
HEART RATE: 50 BPM | RESPIRATION RATE: 16 BRPM | HEIGHT: 67 IN | BODY MASS INDEX: 28.5 KG/M2 | WEIGHT: 181.6 LBS | SYSTOLIC BLOOD PRESSURE: 130 MMHG | DIASTOLIC BLOOD PRESSURE: 80 MMHG | OXYGEN SATURATION: 95 % | TEMPERATURE: 97.9 F

## 2019-04-24 VITALS
WEIGHT: 180 LBS | OXYGEN SATURATION: 97 % | BODY MASS INDEX: 28.93 KG/M2 | RESPIRATION RATE: 20 BRPM | DIASTOLIC BLOOD PRESSURE: 85 MMHG | HEIGHT: 66 IN | SYSTOLIC BLOOD PRESSURE: 162 MMHG | TEMPERATURE: 98.1 F | HEART RATE: 50 BPM

## 2019-04-24 DIAGNOSIS — J40 BRONCHITIS: Primary | ICD-10-CM

## 2019-04-24 DIAGNOSIS — R68.89 FLU-LIKE SYMPTOMS: Primary | ICD-10-CM

## 2019-04-24 DIAGNOSIS — R00.1 BRADYCARDIA: ICD-10-CM

## 2019-04-24 LAB
ALBUMIN SERPL-MCNC: 4 G/DL (ref 3.5–4.6)
ALP BLD-CCNC: 89 U/L (ref 35–104)
ALT SERPL-CCNC: 19 U/L (ref 0–41)
ANION GAP SERPL CALCULATED.3IONS-SCNC: 11 MEQ/L (ref 9–15)
AST SERPL-CCNC: 23 U/L (ref 0–40)
BASOPHILS ABSOLUTE: 0 K/UL (ref 0–0.2)
BASOPHILS RELATIVE PERCENT: 0.7 %
BILIRUB SERPL-MCNC: 0.6 MG/DL (ref 0.2–0.7)
BUN BLDV-MCNC: 20 MG/DL (ref 8–23)
CALCIUM SERPL-MCNC: 9.4 MG/DL (ref 8.5–9.9)
CHLORIDE BLD-SCNC: 100 MEQ/L (ref 95–107)
CO2: 33 MEQ/L (ref 20–31)
CREAT SERPL-MCNC: 1.13 MG/DL (ref 0.7–1.2)
EOSINOPHILS ABSOLUTE: 0.3 K/UL (ref 0–0.7)
EOSINOPHILS RELATIVE PERCENT: 5 %
GFR AFRICAN AMERICAN: >60
GFR NON-AFRICAN AMERICAN: >60
GLOBULIN: 2.8 G/DL (ref 2.3–3.5)
GLUCOSE BLD-MCNC: 90 MG/DL (ref 70–99)
HCT VFR BLD CALC: 39 % (ref 42–52)
HEMOGLOBIN: 13.1 G/DL (ref 14–18)
INFLUENZA A ANTIBODY: NEGATIVE
INFLUENZA B ANTIBODY: NEGATIVE
INR BLD: 1.1
LACTIC ACID: 0.9 MMOL/L (ref 0.5–2.2)
LYMPHOCYTES ABSOLUTE: 0.8 K/UL (ref 1–4.8)
LYMPHOCYTES RELATIVE PERCENT: 12 %
MCH RBC QN AUTO: 32.6 PG (ref 27–31.3)
MCHC RBC AUTO-ENTMCNC: 33.6 % (ref 33–37)
MCV RBC AUTO: 97.3 FL (ref 80–100)
MONOCYTES ABSOLUTE: 0.8 K/UL (ref 0.2–0.8)
MONOCYTES RELATIVE PERCENT: 12.3 %
NEUTROPHILS ABSOLUTE: 4.5 K/UL (ref 1.4–6.5)
NEUTROPHILS RELATIVE PERCENT: 70 %
PDW BLD-RTO: 14.4 % (ref 11.5–14.5)
PLATELET # BLD: 178 K/UL (ref 130–400)
POTASSIUM SERPL-SCNC: 4.1 MEQ/L (ref 3.4–4.9)
PRO-BNP: 699 PG/ML
PROTHROMBIN TIME: 11.2 SEC (ref 9–11.5)
RBC # BLD: 4.01 M/UL (ref 4.7–6.1)
SODIUM BLD-SCNC: 144 MEQ/L (ref 135–144)
TOTAL PROTEIN: 6.8 G/DL (ref 6.3–8)
TROPONIN: <0.01 NG/ML (ref 0–0.01)
WBC # BLD: 6.4 K/UL (ref 4.8–10.8)

## 2019-04-24 PROCEDURE — 2580000003 HC RX 258: Performed by: EMERGENCY MEDICINE

## 2019-04-24 PROCEDURE — 6370000000 HC RX 637 (ALT 250 FOR IP): Performed by: EMERGENCY MEDICINE

## 2019-04-24 PROCEDURE — 83605 ASSAY OF LACTIC ACID: CPT

## 2019-04-24 PROCEDURE — 87040 BLOOD CULTURE FOR BACTERIA: CPT

## 2019-04-24 PROCEDURE — 94640 AIRWAY INHALATION TREATMENT: CPT

## 2019-04-24 PROCEDURE — 85610 PROTHROMBIN TIME: CPT

## 2019-04-24 PROCEDURE — 84484 ASSAY OF TROPONIN QUANT: CPT

## 2019-04-24 PROCEDURE — 80053 COMPREHEN METABOLIC PANEL: CPT

## 2019-04-24 PROCEDURE — 83880 ASSAY OF NATRIURETIC PEPTIDE: CPT

## 2019-04-24 PROCEDURE — 36415 COLL VENOUS BLD VENIPUNCTURE: CPT

## 2019-04-24 PROCEDURE — 85025 COMPLETE CBC W/AUTO DIFF WBC: CPT

## 2019-04-24 PROCEDURE — 93000 ELECTROCARDIOGRAM COMPLETE: CPT | Performed by: FAMILY MEDICINE

## 2019-04-24 PROCEDURE — 71045 X-RAY EXAM CHEST 1 VIEW: CPT

## 2019-04-24 PROCEDURE — 96365 THER/PROPH/DIAG IV INF INIT: CPT

## 2019-04-24 PROCEDURE — 6360000002 HC RX W HCPCS: Performed by: EMERGENCY MEDICINE

## 2019-04-24 PROCEDURE — 99214 OFFICE O/P EST MOD 30 MIN: CPT | Performed by: FAMILY MEDICINE

## 2019-04-24 PROCEDURE — 87804 INFLUENZA ASSAY W/OPTIC: CPT | Performed by: FAMILY MEDICINE

## 2019-04-24 PROCEDURE — 99285 EMERGENCY DEPT VISIT HI MDM: CPT

## 2019-04-24 RX ORDER — SODIUM CHLORIDE 0.9 % (FLUSH) 0.9 %
3 SYRINGE (ML) INJECTION EVERY 8 HOURS
Status: DISCONTINUED | OUTPATIENT
Start: 2019-04-24 | End: 2019-04-24 | Stop reason: HOSPADM

## 2019-04-24 RX ORDER — 0.9 % SODIUM CHLORIDE 0.9 %
1000 INTRAVENOUS SOLUTION INTRAVENOUS ONCE
Status: COMPLETED | OUTPATIENT
Start: 2019-04-24 | End: 2019-04-24

## 2019-04-24 RX ORDER — TRAMADOL HYDROCHLORIDE 50 MG/1
50 TABLET ORAL ONCE
Status: DISCONTINUED | OUTPATIENT
Start: 2019-04-24 | End: 2019-04-24

## 2019-04-24 RX ORDER — IPRATROPIUM BROMIDE AND ALBUTEROL SULFATE 2.5; .5 MG/3ML; MG/3ML
1 SOLUTION RESPIRATORY (INHALATION) ONCE
Status: COMPLETED | OUTPATIENT
Start: 2019-04-24 | End: 2019-04-24

## 2019-04-24 RX ORDER — ALBUTEROL SULFATE 90 UG/1
AEROSOL, METERED RESPIRATORY (INHALATION)
Qty: 1 INHALER | Refills: 1 | Status: SHIPPED | OUTPATIENT
Start: 2019-04-24 | End: 2020-01-02 | Stop reason: SDUPTHER

## 2019-04-24 RX ORDER — AMOXICILLIN AND CLAVULANATE POTASSIUM 875; 125 MG/1; MG/1
1 TABLET, FILM COATED ORAL 2 TIMES DAILY
Qty: 20 TABLET | Refills: 0 | Status: SHIPPED | OUTPATIENT
Start: 2019-04-24 | End: 2019-05-04

## 2019-04-24 RX ORDER — METHYLPREDNISOLONE ACETATE 40 MG/ML
40 INJECTION, SUSPENSION INTRA-ARTICULAR; INTRALESIONAL; INTRAMUSCULAR; SOFT TISSUE ONCE
Status: DISCONTINUED | OUTPATIENT
Start: 2019-04-24 | End: 2019-04-24

## 2019-04-24 RX ORDER — OMEPRAZOLE 40 MG/1
40 CAPSULE, DELAYED RELEASE ORAL
COMMUNITY
Start: 2019-02-27 | End: 2020-01-02

## 2019-04-24 RX ADMIN — CEFTRIAXONE 1 G: 1 INJECTION, POWDER, FOR SOLUTION INTRAMUSCULAR; INTRAVENOUS at 13:15

## 2019-04-24 RX ADMIN — IPRATROPIUM BROMIDE AND ALBUTEROL SULFATE 1 AMPULE: .5; 3 SOLUTION RESPIRATORY (INHALATION) at 12:01

## 2019-04-24 RX ADMIN — SODIUM CHLORIDE 1000 ML: 900 INJECTION, SOLUTION INTRAVENOUS at 12:49

## 2019-04-24 RX ADMIN — Medication 3 ML: at 12:49

## 2019-04-24 ASSESSMENT — ENCOUNTER SYMPTOMS
FACIAL SWELLING: 0
VOICE CHANGE: 0
RHINORRHEA: 1
ABDOMINAL PAIN: 0
CONSTIPATION: 0
DIARRHEA: 0
VOMITING: 0
CHOKING: 0
BACK PAIN: 0
COUGH: 1
CONSTIPATION: 0
COUGH: 1
SHORTNESS OF BREATH: 0
SHORTNESS OF BREATH: 1
VOMITING: 0
TROUBLE SWALLOWING: 0
SINUS PRESSURE: 0
SORE THROAT: 0
CHEST TIGHTNESS: 0
APNEA: 0
EYE REDNESS: 0
DIARRHEA: 0
NAUSEA: 0
STRIDOR: 0
SINUS PAIN: 1
EYE PAIN: 0
EYE DISCHARGE: 0
ABDOMINAL PAIN: 0
BLOOD IN STOOL: 0
CHEST TIGHTNESS: 0
BLOOD IN STOOL: 0
WHEEZING: 0

## 2019-04-24 NOTE — ED NOTES
Patient is refusing ekg . I did inform him that they could also change .       Tana Henry, RN  04/24/19 1978

## 2019-04-24 NOTE — ED PROVIDER NOTES
Skin: Negative for pallor and rash. Neurological: Negative for tremors, seizures, syncope, weakness, numbness and headaches. Hematological: Negative for adenopathy. Does not bruise/bleed easily. Psychiatric/Behavioral: Negative for agitation, behavioral problems, hallucinations and sleep disturbance. The patient is not hyperactive. All other systems reviewed and are negative. Except as noted above the remainder of the review of systems was reviewed and negative.        PAST MEDICAL HISTORY     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    CAD (coronary artery disease)     status post stents in 2006 and 2008    Cervical stenosis of spinal canal     status post laminectomies in cervical spine    CHF (congestive heart failure) ([address] 6/6/2017    Diverticulosis     colonoscopy 9/2016    Diverticulosis of colon 6/6/2017    Fracture of rib of right side     Hernia     umbilical hernia repair    History of TIA (transient ischemic attack) 11/16/2017    HTN (hypertension) 8/26/2014    Hyperlipidemia     Hypertension     Iron deficiency anemia     Melanoma ([address]     right arm 2004    Memory loss 3/4/2014    Mild persistent asthma without complication 3/8/3389    Osteoarthritis     Paroxysmal atrial fibrillation ([address] 11/16/2017    Renal insufficiency     Status post total knee replacement 1/4/2012    Thoracic spine pain     chronic    TIA (transient ischemic attack) 1/16/2017    Tremor     chronic    Ulcerative colitis ([address] 1/20/2017         SURGICALHISTORY       Past Surgical History:   Procedure Laterality Date    [facility]    COLONOSCOPY  09/16/2016    polyp, sigmoid colitis, diverticulosis (DR ROSARIO)    SHOULDER ARTHROPLASTY Left     TOTAL KNEE ARTHROPLASTY Right     TOTAL KNEE ARTHROPLASTY Left     UMBILICAL HERNIA REPAIR      VENTRAL HERNIA REPAIR  01/07/2015 CURRENT MEDICATIONS       Discharge Medication List as of 4/24/2019  1:34 PM      CONTINUE these medications which have NOT CHANGED    Details   omeprazole (PRILOSEC) 40 MG delayed release capsule Take 40 mg by mouthHistorical Med      carbidopa-levodopa (SINEMET)  MG per tablet take 1 tablet by mouth every morning 4 days then twice a day 6 HOURS APARTHistorical Med      ciprofloxacin (CILOXAN) 0.3 % ophthalmic solution Place 1 drop into both eyes 4 times daily for 7 days, Disp-1 Bottle, R-0Normal      glycopyrrolate (ROBINUL) 1 MG tablet Take 1 tablet by mouth daily, Disp-30 tablet, R-1Normal      zafirlukast (ACCOLATE) 20 MG tablet Take 20 mg by mouthHistorical Med      Magnesium 400 MG TABS Take by mouthHistorical Med      tiZANidine (ZANAFLEX) 4 MG tablet take 1 to 2 tablets by mouth at bedtime if neededHistorical Med      !! albuterol sulfate HFA (PROAIR HFA) 108 (90 Base) MCG/ACT inhaler 2 puffs by Other routeHistorical Med      propranolol (INDERAL LA) 120 MG extended release capsule Take 1 capsule by mouth daily, Disp-90 capsule, R-3Normal      potassium chloride (KLOR-CON M) 20 MEQ extended release tablet Take 1 tablet by mouth daily, Disp-90 tablet, R-3Normal      atorvastatin (LIPITOR) 10 MG tablet Take 1 tablet by mouth daily, Disp-90 tablet, R-3Normal      donepezil (ARICEPT) 10 MG tablet Take 10 mg by mouth 2 times daily, R-0Historical Med      hydrALAZINE (APRESOLINE) 10 MG tablet Take 1 tablet by mouth 3 times daily, Disp-270 tablet, R-1Normal      Fluticasone Furoate-Vilanterol 200-25 MCG/INH AEPB Inhale into the lungsHistorical Med      amLODIPine-benazepril (LOTREL) 5-20 MG per capsule Take 1 capsule by mouth 2 times daily, Disp-180 capsule, R-3Normal      fluticasone (FLONASE) 50 MCG/ACT nasal spray 2 sprays by Nasal route daily, Disp-1 Bottle, R-5Normal      QVAR REDIHALER 80 MCG/ACT AERB inhaler inhale 2 puffs by mouth twice a day, Disp-1 Inhaler, R-5, DAWNormal      Fexofenadine HCl (ALLEGRA PO) Take by mouthHistorical Med      ELIQUIS 5 MG TABS tablet DAWHistorical Med      trospium (SANCTURA) 20 MG tablet R-0Historical Med      acetaminophen (TYLENOL) 325 MG tablet Take 650 mg by mouth every 6 hours as needed for PainHistorical Med      torsemide (DEMADEX) 20 MG tablet R-1Historical Med      primidone (MYSOLINE) 50 MG tablet Take 1 tablet by mouth 2 times daily, Disp-90 tablet, R-3      aspirin 81 MG EC tablet Take 81 mg by mouth daily Patient is taking once a weekHistorical Med      Multiple Vitamin (DAILY VITAMIN PO)   Take 1 tablet by mouth daily        ! ! - Potential duplicate medications found. Please discuss with provider.           ALLERGIES     Codeine; Morphine; and Singulair [montelukast]    FAMILY HISTORY       Family History   Adopted: Yes          SOCIAL HISTORY       Social History     Socioeconomic History    Marital status:      Spouse name: None    Number of children: None    Years of education: None    Highest education level: None   Occupational History    None   Social Needs    Financial resource strain: None    Food insecurity:     Worry: None     Inability: None    Transportation needs:     Medical: None     Non-medical: None   Tobacco Use    Smoking status: Never Smoker    Smokeless tobacco: Never Used   Substance and Sexual Activity    Alcohol use: Yes     Comment: occasional    Drug use: No    Sexual activity: Yes     Partners: Female   Lifestyle    Physical activity:     Days per week: None     Minutes per session: None    Stress: None   Relationships    Social connections:     Talks on phone: None     Gets together: None     Attends Restorationism service: None     Active member of club or organization: None     Attends meetings of clubs or organizations: None     Relationship status: None    Intimate partner violence:     Fear of current or ex partner: None     Emotionally abused: None     Physically abused: None     Forced sexual activity: None   Other Topics Concern    None   Social History Narrative    None       SCREENINGS      @FLOW([id number])@      PHYSICAL EXAM    (up to 7 for level 4, 8 or more for level 5)     ED Triage Vitals   BP Temp Temp src Pulse Resp SpO2 Height Weight   -- -- -- -- -- -- -- --       Physical Exam   Constitutional: He is oriented to person, place, and time. He appears well-developed. Active alert cooperative patient looks and sounds very congested and looks mildly dehydrated but ambulatory   HENT:   Head: Normocephalic. Right Ear: External ear normal.   Left Ear: External ear normal.   Mouth/Throat: Oropharynx is clear and moist.   Looks and sounds very congested nasally   Eyes: Pupils are equal, round, and reactive to light. Conjunctivae are normal.   Neck: Normal range of motion. Neck supple. Cardiovascular: Normal rate, regular rhythm and normal heart sounds. Exam reveals no gallop. No murmur heard. Pulmonary/Chest: No respiratory distress. He has wheezes. He has rales. Abdominal: Soft. Bowel sounds are normal. He exhibits no mass. There is no rebound. Musculoskeletal: Normal range of motion. He exhibits no edema or tenderness. Neurological: He is alert and oriented to person, place, and time. No cranial nerve deficit. He exhibits normal muscle tone. Skin: Skin is warm. No rash noted. No erythema. Psychiatric: His behavior is normal. Thought content normal.   Nursing note and vitals reviewed.       DIAGNOSTIC RESULTS     EKG: All EKG's are interpreted by the Emergency Department Physician who either signs or Co-signsthis chart in the absence of a cardiologist.        RADIOLOGY:   Non-plain filmimages such as CT, Ultrasound and MRI are read by the radiologist. Plain radiographic images are visualized and preliminarily interpreted by the emergency physician with the below findings:        Interpretation per the Radiologist below, if available at the time ofthis note:    XR CHEST PORTABLE   Final

## 2019-04-24 NOTE — PROGRESS NOTES
Subjective:      Patient ID: Rose Ware is a 78 y.o. male who presents today for:     Chief Complaint   Patient presents with    URI     Patient presents with cough, congestion, fever over the past week. Patient has been taking OTC cold medications. HPI  This is a 78-year-old male  presents with his wife today complaining of cough, congestion and fever over the past 5 days that is becoming progressively worse. He states the cough is productive of green sputum and that he is short of breath. States that last fever was this morning and recorded in the 101. 3. He has not taken any antipyretics this morning and fever has resolved. He admits to chills and diaphoresis, as well as dyspnea with exertion. He is scheduled for a stress test tomorrow, but wife is canceled the test due to current sickness.    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    CAD (coronary artery disease)     status post stents in 2006 and 2008    Cervical stenosis of spinal canal     status post laminectomies in cervical spine    CHF (congestive heart failure) ([address] 6/6/2017    Diverticulosis     colonoscopy 9/2016    Diverticulosis of colon 6/6/2017    Fracture of rib of right side     Hernia     umbilical hernia repair    History of TIA (transient ischemic attack) 11/16/2017    HTN (hypertension) 8/26/2014    Hyperlipidemia     Hypertension     Iron deficiency anemia     Melanoma ([address]     right arm 2004    Memory loss 3/4/2014    Mild persistent asthma without complication 8/4/2518    Osteoarthritis     Paroxysmal atrial fibrillation ([address] 11/16/2017    Renal insufficiency     Status post total knee replacement 1/4/2012    Thoracic spine pain     chronic    TIA (transient ischemic attack) 1/16/2017    Tremor     chronic    Ulcerative colitis ([address] 1/20/2017     Past Surgical History:   Procedure Laterality Date    [facility]    COLONOSCOPY  09/16/2016    polyp, sigmoid colitis, diverticulosis (DR ROSARIO)    SHOULDER ARTHROPLASTY Left     TOTAL KNEE ARTHROPLASTY Right     TOTAL KNEE ARTHROPLASTY Left     UMBILICAL HERNIA REPAIR      VENTRAL HERNIA REPAIR  01/07/2015     Family History   Adopted: Yes     Social History     Socioeconomic History    Marital status:      Spouse name: Not on file    Number of children: Not on file    Years of education: Not on file    Highest education level: Not on file   Occupational History    Not on file   Social Needs    Financial resource strain: Not on file    Food insecurity:     Worry: Not on file     Inability: Not on file    Transportation needs:     Medical: Not on file     Non-medical: Not on file   Tobacco Use    Smoking status: Never Smoker    Smokeless tobacco: Never Used   Substance and Sexual Activity    Alcohol use: Yes     Comment: occasional    Drug use: No    Sexual activity: Yes     Partners: Female   Lifestyle    Physical activity:     Days per week: Not on file     Minutes per session: Not on file    Stress: Not on file   Relationships    Social connections:     Talks on phone: Not on file     Gets together: Not on file     Attends Holiness service: Not on file     Active member of club or organization: Not on file     Attends meetings of clubs or organizations: Not on file     Relationship status: Not on file    Intimate partner violence:     Fear of current or ex partner: Not on file     Emotionally abused: Not on file     Physically abused: Not on file     Forced sexual activity: Not on file   Other Topics Concern    Not on file   Social History Narrative    Not on file     Current Outpatient Medications on File Prior to Visit   Medication Sig Dispense Refill    omeprazole (PRILOSEC) 40 MG delayed release capsule Take 40 mg by mouth      carbidopa-levodopa (SINEMET)  MG per tablet take 1 tablet by mouth every fever. Negative for activity change, appetite change and fatigue. Respiratory: Positive for cough. Negative for apnea, chest tightness and shortness of breath. Cardiovascular: Negative for chest pain, palpitations and leg swelling. Gastrointestinal: Negative for abdominal pain, blood in stool, constipation, diarrhea, nausea and vomiting. Musculoskeletal: Negative for arthralgias. Neurological: Negative for seizures and headaches. Psychiatric/Behavioral: Negative for hallucinations and suicidal ideas. Objective:   /80 (Site: Left Upper Arm, Position: Sitting, Cuff Size: Large Adult)   Pulse 50   Temp 97.9 °F (36.6 °C) (Temporal)   Resp 16   Ht 5' 7\" (1.702 m)   Wt 181 lb 9.6 oz (82.4 kg)   SpO2 95%   BMI 28.44 kg/m²     Physical Exam   Constitutional: He is oriented to person, place, and time. He appears well-developed and well-nourished. No distress. HENT:   Head: Normocephalic and atraumatic. Eyes: Pupils are equal, round, and reactive to light. Conjunctivae and EOM are normal.   Neck: Normal range of motion. Cardiovascular: Normal rate, regular rhythm and normal heart sounds. Exam reveals no gallop and no friction rub. No murmur heard. Pulmonary/Chest: Effort normal. No respiratory distress. He has wheezes (Diffuse). He has no rales. He exhibits no tenderness. Neurological: He is alert and oriented to person, place, and time. Skin: Skin is warm and dry. He is not diaphoretic. Psychiatric: He has a normal mood and affect. His behavior is normal. Judgment and thought content normal.   Nursing note and vitals reviewed. Assessment & Plan:     1. Flu-like symptoms  Influenza negative  Due to significant fever and crackles and wheezes on auscultation, well as significant comorbidities believe patient should have stat lab work done and chest x-ray done through the emergency room  - POCT Influenza A/B    2.  Bradycardia  New onset and not the patient's baseline  Patient will benefit from cardiac enzymes and further evaluation in the emergency room  - EKG 12 Lead      No follow-ups on file.     Rachel Cochran MD

## 2019-04-29 LAB
BLOOD CULTURE, ROUTINE: NORMAL
CULTURE, BLOOD 2: NORMAL

## 2019-05-01 ENCOUNTER — OFFICE VISIT (OUTPATIENT)
Dept: FAMILY MEDICINE CLINIC | Age: 79
End: 2019-05-01
Payer: MEDICARE

## 2019-05-01 DIAGNOSIS — J40 BRONCHITIS: Primary | ICD-10-CM

## 2019-05-01 PROCEDURE — 99213 OFFICE O/P EST LOW 20 MIN: CPT | Performed by: FAMILY MEDICINE

## 2019-05-01 NOTE — PROGRESS NOTES
 VENTRAL HERNIA REPAIR  01/07/2015     Family History   Adopted: Yes     Social History     Socioeconomic History    Marital status:      Spouse name: Not on file    Number of children: Not on file    Years of education: Not on file    Highest education level: Not on file   Occupational History    Not on file   Social Needs    Financial resource strain: Not on file    Food insecurity:     Worry: Not on file     Inability: Not on file    Transportation needs:     Medical: Not on file     Non-medical: Not on file   Tobacco Use    Smoking status: Never Smoker    Smokeless tobacco: Never Used   Substance and Sexual Activity    Alcohol use: Yes     Comment: occasional    Drug use: No    Sexual activity: Yes     Partners: Female   Lifestyle    Physical activity:     Days per week: Not on file     Minutes per session: Not on file    Stress: Not on file   Relationships    Social connections:     Talks on phone: Not on file     Gets together: Not on file     Attends Anabaptist service: Not on file     Active member of club or organization: Not on file     Attends meetings of clubs or organizations: Not on file     Relationship status: Not on file    Intimate partner violence:     Fear of current or ex partner: Not on file     Emotionally abused: Not on file     Physically abused: Not on file     Forced sexual activity: Not on file   Other Topics Concern    Not on file   Social History Narrative    Not on file     Current Outpatient Medications on File Prior to Visit   Medication Sig Dispense Refill    omeprazole (PRILOSEC) 40 MG delayed release capsule Take 40 mg by mouth      carbidopa-levodopa (SINEMET)  MG per tablet take 1 tablet by mouth every morning 4 days then twice a day 6 HOURS APART      albuterol sulfate HFA (PROAIR HFA) 108 (90 Base) MCG/ACT inhaler Use every 4 hours while awake for 7-10 days then PRN wheezing  Dispense with SPACER and Instruct on use.   May sub Ventolin or activity change, appetite change and fatigue. Respiratory: Positive for cough (improved). Negative for apnea, chest tightness, shortness of breath, wheezing and stridor. Cardiovascular: Negative for chest pain, palpitations and leg swelling. Gastrointestinal: Negative for abdominal pain, blood in stool, constipation, diarrhea, nausea and vomiting. Musculoskeletal: Negative for arthralgias. Neurological: Negative for seizures and headaches. Psychiatric/Behavioral: Negative for hallucinations and suicidal ideas. Objective:   BP (!) 144/80 (Site: Left Upper Arm, Position: Sitting, Cuff Size: Large Adult) Comment: later entry was done 5/1/2019  Pulse 53   Temp 98.2 °F (36.8 °C) (Temporal)   Resp 16   Ht 5' 7\" (1.702 m)   Wt 180 lb (81.6 kg)   SpO2 97%   BMI 28.19 kg/m²     Physical Exam   Constitutional: He is oriented to person, place, and time. He appears well-developed and well-nourished. No distress. HENT:   Head: Normocephalic and atraumatic. Eyes: Pupils are equal, round, and reactive to light. Conjunctivae and EOM are normal.   Neck: Normal range of motion. Cardiovascular: Normal rate, regular rhythm and normal heart sounds. Exam reveals no gallop and no friction rub. No murmur heard. Pulmonary/Chest: Effort normal and breath sounds normal. No respiratory distress. He has no wheezes. He has no rales. He exhibits no tenderness. Neurological: He is alert and oriented to person, place, and time. Skin: Skin is warm and dry. He is not diaphoretic. Psychiatric: He has a normal mood and affect. His behavior is normal. Judgment and thought content normal.   Nursing note and vitals reviewed. Assessment & Plan:     1. Bronchitis  Improved: continue current treatment      Return if symptoms worsen or fail to improve.     Juana Jones MD

## 2019-05-09 VITALS
HEIGHT: 67 IN | SYSTOLIC BLOOD PRESSURE: 144 MMHG | DIASTOLIC BLOOD PRESSURE: 80 MMHG | TEMPERATURE: 98.2 F | HEART RATE: 53 BPM | BODY MASS INDEX: 28.25 KG/M2 | OXYGEN SATURATION: 97 % | RESPIRATION RATE: 16 BRPM | WEIGHT: 180 LBS

## 2019-05-09 ASSESSMENT — ENCOUNTER SYMPTOMS
CONSTIPATION: 0
VOMITING: 0
NAUSEA: 0
CHEST TIGHTNESS: 0
BLOOD IN STOOL: 0
WHEEZING: 0
APNEA: 0
STRIDOR: 0
SHORTNESS OF BREATH: 0
DIARRHEA: 0
ABDOMINAL PAIN: 0
COUGH: 1

## 2019-05-21 ENCOUNTER — OFFICE VISIT (OUTPATIENT)
Dept: FAMILY MEDICINE CLINIC | Age: 79
End: 2019-05-21
Payer: MEDICARE

## 2019-05-21 VITALS
TEMPERATURE: 96.5 F | HEART RATE: 77 BPM | OXYGEN SATURATION: 90 % | BODY MASS INDEX: 27.88 KG/M2 | WEIGHT: 177.6 LBS | SYSTOLIC BLOOD PRESSURE: 122 MMHG | RESPIRATION RATE: 14 BRPM | DIASTOLIC BLOOD PRESSURE: 82 MMHG | HEIGHT: 67 IN

## 2019-05-21 DIAGNOSIS — I50.9 CHRONIC CONGESTIVE HEART FAILURE, UNSPECIFIED HEART FAILURE TYPE (HCC): Chronic | ICD-10-CM

## 2019-05-21 DIAGNOSIS — E78.5 HYPERLIPIDEMIA, UNSPECIFIED HYPERLIPIDEMIA TYPE: Chronic | ICD-10-CM

## 2019-05-21 DIAGNOSIS — I10 ESSENTIAL HYPERTENSION: Primary | Chronic | ICD-10-CM

## 2019-05-21 DIAGNOSIS — N40.1 BPH WITH OBSTRUCTION/LOWER URINARY TRACT SYMPTOMS: Chronic | ICD-10-CM

## 2019-05-21 DIAGNOSIS — J45.901 PERSISTENT ASTHMA WITH ACUTE EXACERBATION, UNSPECIFIED ASTHMA SEVERITY: ICD-10-CM

## 2019-05-21 DIAGNOSIS — I25.10 CORONARY ARTERY DISEASE INVOLVING NATIVE CORONARY ARTERY OF NATIVE HEART WITHOUT ANGINA PECTORIS: Chronic | ICD-10-CM

## 2019-05-21 DIAGNOSIS — N13.8 BPH WITH OBSTRUCTION/LOWER URINARY TRACT SYMPTOMS: Chronic | ICD-10-CM

## 2019-05-21 DIAGNOSIS — K51.90 ULCERATIVE COLITIS WITHOUT COMPLICATIONS, UNSPECIFIED LOCATION (HCC): Chronic | ICD-10-CM

## 2019-05-21 DIAGNOSIS — I48.0 PAROXYSMAL ATRIAL FIBRILLATION (HCC): Chronic | ICD-10-CM

## 2019-05-21 PROCEDURE — 99214 OFFICE O/P EST MOD 30 MIN: CPT | Performed by: FAMILY MEDICINE

## 2019-05-21 RX ORDER — PREDNISONE 50 MG/1
50 TABLET ORAL DAILY
Qty: 5 TABLET | Refills: 0 | Status: SHIPPED | OUTPATIENT
Start: 2019-05-21 | End: 2019-05-26

## 2019-05-21 ASSESSMENT — ENCOUNTER SYMPTOMS
SHORTNESS OF BREATH: 1
DIARRHEA: 0
BLOOD IN STOOL: 0
CHEST TIGHTNESS: 1
TROUBLE SWALLOWING: 0
ABDOMINAL DISTENTION: 0
COUGH: 1
NAUSEA: 0
CONSTIPATION: 0
SORE THROAT: 0
SINUS PAIN: 0
VOMITING: 0
ABDOMINAL PAIN: 0
WHEEZING: 1
ANAL BLEEDING: 0

## 2019-05-21 NOTE — PROGRESS NOTES
Subjective:      Patient ID: Abelino Espinosa is a 78 y.o. male who presents today for:     Chief Complaint   Patient presents with    Hyperlipidemia     Patient presents today with routine chronic check up     Hypertension       HPI     Patient presents for chronic hypertension, hyperlipidemia, CHF, CAD, A. fib, asthma, BPH follow-up visit. He reports taking his medications as prescribed without any noted side effects. He denies adhering to any specific lower salt or lower carbohydrate diet. He denies any routine exercise outside of his normal day-to-day activity. There is no reported chest pain, palpitations, lightheadedness, dizziness, lower extremity edema, or syncope. He reported more dyspnea on exertion over the past month when seen by cardiology last month and had recent stress testing to further evaluate. He has visit scheduled to discuss results next week. He reports chronic problems with intermittent wheezing and chest tightness with intermittent cough that is being addressed by pulmonology with medication adjustments but without improvement. He reports need to use his rescue inhaler more frequently over the past week.       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    CAD (coronary artery disease)     status post stents in 2006 and 2008    Cervical stenosis of spinal canal     status post laminectomies in cervical spine    CHF (congestive heart failure) ([address] 6/6/2017    Diverticulosis     colonoscopy 9/2016    Diverticulosis of colon 6/6/2017    Fracture of rib of right side     Hernia     umbilical hernia repair    History of TIA (transient ischemic attack) 11/16/2017    HTN (hypertension) 8/26/2014    Hyperlipidemia     Hypertension     Iron deficiency anemia     Melanoma ([address]     right arm 2004    Memory loss 3/4/2014    Mild persistent asthma without complication 2/9/0759    Osteoarthritis     Paroxysmal [facility] fibrillation [facility] 11/16/2017    Renal insufficiency     Status post total knee replacement 1/4/2012    Thoracic spine pain     chronic    TIA (transient ischemic attack) 1/16/2017    Tremor     chronic    Ulcerative colitis ([facility] 1/20/2017     Past Surgical History:   Procedure Laterality Date    [facility]    COLONOSCOPY  09/16/2016    polyp, sigmoid colitis, diverticulosis ([facility])    SHOULDER ARTHROPLASTY Left     TOTAL KNEE ARTHROPLASTY Right     TOTAL KNEE ARTHROPLASTY Left     UMBILICAL HERNIA REPAIR      VENTRAL HERNIA REPAIR  01/07/2015     Family History   Adopted: Yes     Social History     Socioeconomic History    Marital status:      Spouse name: Not on file    Number of children: Not on file    Years of education: Not on file    Highest education level: Not on file   Occupational History    Not on file   Social Needs    Financial resource strain: Not on file    Food insecurity:     Worry: Not on file     Inability: Not on file    Transportation needs:     Medical: Not on file     Non-medical: Not on file   Tobacco Use    Smoking status: Never Smoker    Smokeless tobacco: Never Used   Substance and Sexual Activity    Alcohol use: Yes     Comment: occasional    Drug use: No    Sexual activity: Yes     Partners: Female   Lifestyle    Physical activity:     Days per week: Not on file     Minutes per session: Not on file    Stress: Not on file   Relationships    Social connections:     Talks on phone: Not on file     Gets together: Not on file     Attends Sikhism service: Not on file     Active member of club or organization: Not on file     Attends meetings of clubs or organizations: Not on file     Relationship status: Not on file    Intimate partner violence:     Fear of current or ex partner: Not on file     Emotionally abused: Not on file     Physically abused: Not on file     Forced sexual activity: Not on file   Other Topics Concern    Not on file   Social History Narrative    Not on file     Current Outpatient Medications on File Prior to Visit   Medication Sig Dispense Refill    omeprazole (PRILOSEC) 40 MG delayed release capsule Take 40 mg by mouth      albuterol sulfate HFA (PROAIR HFA) 108 (90 Base) MCG/ACT inhaler Use every 4 hours while awake for 7-10 days then PRN wheezing  Dispense with SPACER and Instruct on use. May sub Ventolin or Proventil as needed per [employer].  1 Inhaler 1    glycopyrrolate (ROBINUL) 1 MG tablet Take 1 tablet by mouth daily 30 tablet 1    zafirlukast (ACCOLATE) 20 MG tablet Take 20 mg by mouth      Magnesium 400 MG TABS Take by mouth      tiZANidine (ZANAFLEX) 4 MG tablet take 1 to 2 tablets by mouth at bedtime if needed      albuterol sulfate HFA (PROAIR HFA) 108 (90 Base) MCG/ACT inhaler 2 puffs by Other route      propranolol (INDERAL LA) 120 MG extended release capsule Take 1 capsule by mouth daily 90 capsule 3    potassium chloride (KLOR-CON M) 20 MEQ extended release tablet Take 1 tablet by mouth daily 90 tablet 3    atorvastatin (LIPITOR) 10 MG tablet Take 1 tablet by mouth daily 90 tablet 3    donepezil (ARICEPT) 10 MG tablet Take 10 mg by mouth 2 times daily  0    hydrALAZINE (APRESOLINE) 10 MG tablet Take 1 tablet by mouth 3 times daily 270 tablet 1    Fluticasone Furoate-Vilanterol 200-25 MCG/INH AEPB Inhale into the lungs      amLODIPine-benazepril (LOTREL) 5-20 MG per capsule Take 1 capsule by mouth 2 times daily 180 capsule 3    fluticasone (FLONASE) 50 MCG/ACT nasal spray 2 sprays by Nasal route daily 1 Bottle 5    QVAR REDIHALER 80 MCG/ACT AERB inhaler inhale 2 puffs by mouth twice a day 1 Inhaler 5    Fexofenadine HCl (ALLEGRA PO) Take by mouth      ELIQUIS 5 MG TABS tablet       trospium (SANCTURA) 20 MG tablet   0    acetaminophen (TYLENOL) 325 MG tablet Take 650 mg by mouth every 6 hours as needed for Pain      torsemide (DEMADEX) 20 MG Physical Exam   Constitutional: He appears well-developed and well-nourished. HENT:   Head: Normocephalic and atraumatic. Right Ear: External ear normal.   Left Ear: External ear normal.   Nose: Mucosal edema present. Right sinus exhibits no maxillary sinus tenderness and no frontal sinus tenderness. Left sinus exhibits no maxillary sinus tenderness and no frontal sinus tenderness. Mouth/Throat: Mucous membranes are normal. No oral lesions. No oropharyngeal exudate or posterior oropharyngeal erythema. Neck: Neck supple. Carotid bruit is not present. No thyromegaly present. Cardiovascular: Normal rate, regular rhythm and normal heart sounds. No murmur heard. Pulmonary/Chest: Effort normal. No accessory muscle usage. No tachypnea. No respiratory distress. He has decreased breath sounds. He has wheezes (expiratory wheezing throughout). He has no rhonchi. He has no rales. Abdominal: Soft. Bowel sounds are normal. He exhibits no distension. There is no tenderness. There is no rebound and no guarding. Musculoskeletal: Normal range of motion. He exhibits no edema (bilateral lower extremities). Lymphadenopathy:     He has no cervical adenopathy. Neurological: He is alert. Skin: Skin is warm. No rash noted. Psychiatric: He has a normal mood and affect. His speech is normal and behavior is normal. Thought content normal.        Ortho Exam (If Applicable)      Assessment & Plan:      1. Essential hypertension  Blood pressure within normal limits today in the office. Continue current medication    2. Paroxysmal atrial fibrillation (HCC)  Patient currently rate controlled and remains on anticoagulation without signs of bleeding. Continue current management    3. Chronic congestive heart failure, unspecified heart failure type [facility])  Patient established with cardiology and is status post recent stress testing for further evaluation.   He has follow up visit in place with the specialist to review results and discuss further treatment plans. No noted worsening lower extremity edema and no reported orthopnea or PND today in the office    4. Hyperlipidemia, unspecified hyperlipidemia type  Most recent lipid panel stable. Continue current medication. We will continue to follow labwork over time    5. Coronary artery disease involving native coronary artery of native heart without angina pectoris  Patient is status post recent stress testing per cardiology office and has follow-up in place to discuss long-term management recommendations with the specialist.    6. Persistent asthma with acute exacerbation, unspecified asthma severity  Patient with noted airway exacerbation today in the office without suggestion of infection. Will treat symptomatically with burst course prednisone. Patient has follow-up in place with pulmonology to discuss further long-term management    - predniSONE (DELTASONE) 50 MG tablet; Take 1 tablet by mouth daily for 5 days  Dispense: 5 tablet; Refill: 0    7. Ulcerative colitis without complications, unspecified location ([facility]  No reported abdominal complaints and improved stooling reported. 8. BPH with obstruction/lower urinary tract symptoms  Patient reports stable urination with use of medication. Continue current management      Modified Medications    No medications on file          New Prescriptions    PREDNISONE (DELTASONE) 50 MG TABLET    Take 1 tablet by mouth daily for 5 days        There are no discontinued medications. Return in about 6 months (around 11/21/2019) for Annual Wellness Visit.     Lisseth Malagon MD

## 2019-07-02 ENCOUNTER — PATIENT MESSAGE (OUTPATIENT)
Dept: FAMILY MEDICINE CLINIC | Age: 79
End: 2019-07-02

## 2019-07-02 DIAGNOSIS — I10 ESSENTIAL HYPERTENSION: Chronic | ICD-10-CM

## 2019-07-02 RX ORDER — HYDRALAZINE HYDROCHLORIDE 10 MG/1
10 TABLET, FILM COATED ORAL 3 TIMES DAILY
Qty: 270 TABLET | Refills: 3 | Status: SHIPPED | OUTPATIENT
Start: 2019-07-02 | End: 2020-05-21 | Stop reason: SDUPTHER

## 2019-07-16 ENCOUNTER — TELEPHONE (OUTPATIENT)
Dept: GASTROENTEROLOGY | Age: 79
End: 2019-07-16

## 2019-07-16 ENCOUNTER — OFFICE VISIT (OUTPATIENT)
Dept: GASTROENTEROLOGY | Age: 79
End: 2019-07-16
Payer: MEDICARE

## 2019-07-16 VITALS — HEIGHT: 67 IN | WEIGHT: 175.4 LBS | RESPIRATION RATE: 16 BRPM | BODY MASS INDEX: 27.53 KG/M2

## 2019-07-16 DIAGNOSIS — R10.30 LOWER ABDOMINAL PAIN: Primary | ICD-10-CM

## 2019-07-16 PROCEDURE — 99203 OFFICE O/P NEW LOW 30 MIN: CPT | Performed by: SPECIALIST

## 2019-07-16 RX ORDER — DICYCLOMINE HYDROCHLORIDE 10 MG/1
10 CAPSULE ORAL 4 TIMES DAILY
Qty: 120 CAPSULE | Refills: 3 | Status: SHIPPED | OUTPATIENT
Start: 2019-07-16 | End: 2019-12-17 | Stop reason: SDUPTHER

## 2019-07-16 ASSESSMENT — ENCOUNTER SYMPTOMS
EYES NEGATIVE: 1
VOMITING: 0
DIARRHEA: 1
ABDOMINAL PAIN: 1
ABDOMINAL DISTENTION: 0
BLOOD IN STOOL: 0
NAUSEA: 0
RESPIRATORY NEGATIVE: 1
RECTAL PAIN: 0
CONSTIPATION: 0
ANAL BLEEDING: 0

## 2019-07-16 NOTE — PROGRESS NOTES
Gastroenterology Clinic Visit    Alycia Stoll  [id number]  Chief Complaint   Patient presents with    Follow-up       HPI: 78 y.o. male presents to the clinic with history of pain lower abdomen in the suprapubic area. Has occasional soft bowel movement. No history of any rectal bleeding. No history of any nausea or vomiting. No mucus in the stool. Symptoms occurs usually in the morning and then resolves as day progresses. No nocturnal bowel movement. Has some dysuria. Patient had a colonoscopy in September 2016 which showed diverticulosis and mild inflammation of the sigmoid colon and biopsies were unremarkable. No known family history of IBD. No history of any weight loss. Usually symptoms occurs in the morning. Review of Systems   Constitutional: Negative. HENT: Negative. Eyes: Negative. Respiratory: Negative. Gastrointestinal: Positive for abdominal pain and diarrhea. Negative for abdominal distention, anal bleeding, blood in stool, constipation, nausea, rectal pain and vomiting. Genitourinary: Negative. Musculoskeletal: Negative. Neurological: Positive for tremors. Psychiatric/Behavioral: Negative.          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    CAD (coronary artery disease)     status post stents in 2006 and 2008    Cervical stenosis of spinal canal     status post laminectomies in cervical spine    CHF (congestive heart failure) ([facility] 6/6/2017    Diverticulosis     colonoscopy 9/2016    Diverticulosis of colon 6/6/2017    Fracture of rib of right side     Hernia     umbilical hernia repair    History of TIA (transient ischemic attack) 11/16/2017    HTN (hypertension) 8/26/2014    Hyperlipidemia     Hypertension     Iron deficiency anemia     Melanoma ([address]     right arm 2004    Memory loss 3/4/2014    Mild persistent asthma without complication 3/5/4518    10 MG tablet Take 10 mg by mouth 2 times daily  0    Fluticasone Furoate-Vilanterol 200-25 MCG/INH AEPB Inhale into the lungs      amLODIPine-benazepril (LOTREL) 5-20 MG per capsule Take 1 capsule by mouth 2 times daily 180 capsule 3    fluticasone (FLONASE) 50 MCG/ACT nasal spray 2 sprays by Nasal route daily 1 Bottle 5    QVAR REDIHALER 80 MCG/ACT AERB inhaler inhale 2 puffs by mouth twice a day 1 Inhaler 5    Fexofenadine HCl (ALLEGRA PO) Take by mouth      ELIQUIS 5 MG TABS tablet       trospium (SANCTURA) 20 MG tablet   0    acetaminophen (TYLENOL) 325 MG tablet Take 650 mg by mouth every 6 hours as needed for Pain      torsemide (DEMADEX) 20 MG tablet   1    primidone (MYSOLINE) 50 MG tablet Take 1 tablet by mouth 2 times daily 90 tablet 3    aspirin 81 MG EC tablet Take 81 mg by mouth daily Patient is taking once a week      Multiple Vitamin (DAILY VITAMIN PO)   Take 1 tablet by mouth daily        No current facility-administered medications on file prior to visit.       Family History   Adopted: Yes      Social History     Socioeconomic History    Marital status:      Spouse name: None    Number of children: None    Years of education: None    Highest education level: None   Occupational History    None   Social Needs    Financial resource strain: None    Food insecurity:     Worry: None     Inability: None    Transportation needs:     Medical: None     Non-medical: None   Tobacco Use    Smoking status: Never Smoker    Smokeless tobacco: Never Used   Substance and Sexual Activity    Alcohol use: Yes     Comment: occasional    Drug use: No    Sexual activity: Yes     Partners: Female   Lifestyle    Physical activity:     Days per week: None     Minutes per session: None    Stress: None   Relationships    Social connections:     Talks on phone: None     Gets together: None     Attends Sabianist service: None     Active member of club or organization: None     Attends meetings

## 2019-07-16 NOTE — TELEPHONE ENCOUNTER
Dr. Sloane Mcnamara prescribed Bentyl but there was an e-prescribing error. Did a verbal with [facility] in [place].

## 2019-08-07 ENCOUNTER — OFFICE VISIT (OUTPATIENT)
Dept: GASTROENTEROLOGY | Age: 79
End: 2019-08-07
Payer: MEDICARE

## 2019-08-07 VITALS — BODY MASS INDEX: 27.47 KG/M2 | OXYGEN SATURATION: 97 % | RESPIRATION RATE: 20 BRPM | HEART RATE: 47 BPM | HEIGHT: 67 IN

## 2019-08-07 DIAGNOSIS — R10.32 LEFT LOWER QUADRANT PAIN: Primary | ICD-10-CM

## 2019-08-07 PROCEDURE — 99212 OFFICE O/P EST SF 10 MIN: CPT | Performed by: SPECIALIST

## 2019-12-09 DIAGNOSIS — I10 ESSENTIAL HYPERTENSION: Chronic | ICD-10-CM

## 2019-12-09 RX ORDER — AMLODIPINE BESYLATE AND BENAZEPRIL HYDROCHLORIDE 5; 20 MG/1; MG/1
1 CAPSULE ORAL 2 TIMES DAILY
Qty: 180 CAPSULE | Refills: 3 | Status: SHIPPED | OUTPATIENT
Start: 2019-12-09 | End: 2020-12-07 | Stop reason: SDUPTHER

## 2019-12-17 RX ORDER — DICYCLOMINE HYDROCHLORIDE 10 MG/1
10 CAPSULE ORAL 4 TIMES DAILY
Qty: 120 CAPSULE | Refills: 3 | Status: SHIPPED | OUTPATIENT
Start: 2019-12-17 | End: 2020-05-21 | Stop reason: SDUPTHER

## 2020-01-02 ENCOUNTER — OFFICE VISIT (OUTPATIENT)
Dept: FAMILY MEDICINE CLINIC | Age: 80
End: 2020-01-02
Payer: MEDICARE

## 2020-01-02 VITALS
OXYGEN SATURATION: 97 % | TEMPERATURE: 96.7 F | HEIGHT: 67 IN | DIASTOLIC BLOOD PRESSURE: 78 MMHG | SYSTOLIC BLOOD PRESSURE: 132 MMHG | RESPIRATION RATE: 16 BRPM | HEART RATE: 54 BPM | WEIGHT: 182.8 LBS | BODY MASS INDEX: 28.69 KG/M2

## 2020-01-02 PROBLEM — Z86.010 PERSONAL HISTORY OF COLONIC POLYPS: Status: ACTIVE | Noted: 2018-01-02

## 2020-01-02 PROCEDURE — G0439 PPPS, SUBSEQ VISIT: HCPCS | Performed by: FAMILY MEDICINE

## 2020-01-02 RX ORDER — POTASSIUM CHLORIDE 20 MEQ/1
20 TABLET, EXTENDED RELEASE ORAL DAILY
Qty: 90 TABLET | Refills: 1 | Status: SHIPPED | OUTPATIENT
Start: 2020-01-02 | End: 2020-07-12

## 2020-01-02 RX ORDER — AZELASTINE 1 MG/ML
1 SPRAY, METERED NASAL 2 TIMES DAILY
COMMUNITY
End: 2021-10-21

## 2020-01-02 RX ORDER — MOMETASONE FUROATE AND FORMOTEROL FUMARATE DIHYDRATE 200; 5 UG/1; UG/1
AEROSOL RESPIRATORY (INHALATION)
Refills: 0 | COMMUNITY
Start: 2019-12-11 | End: 2021-12-09 | Stop reason: SDUPTHER

## 2020-01-02 RX ORDER — ATORVASTATIN CALCIUM 10 MG/1
10 TABLET, FILM COATED ORAL DAILY
Qty: 90 TABLET | Refills: 1 | Status: SHIPPED | OUTPATIENT
Start: 2020-01-02 | End: 2020-05-21 | Stop reason: SDUPTHER

## 2020-01-02 ASSESSMENT — LIFESTYLE VARIABLES
HOW OFTEN DO YOU HAVE A DRINK CONTAINING ALCOHOL: 0
HOW OFTEN DO YOU HAVE A DRINK CONTAINING ALCOHOL: 0

## 2020-01-02 ASSESSMENT — PATIENT HEALTH QUESTIONNAIRE - PHQ9
SUM OF ALL RESPONSES TO PHQ QUESTIONS 1-9: 2

## 2020-01-02 NOTE — PATIENT INSTRUCTIONS
Personalized Preventive Plan for Nereida Gonzales - 1/2/2020  Medicare offers a range of preventive health benefits. Some of the tests and screenings are paid in full while other may be subject to a deductible, co-insurance, and/or copay. Some of these benefits include a comprehensive review of your medical history including lifestyle, illnesses that may run in your family, and various assessments and screenings as appropriate. After reviewing your medical record and screening and assessments performed today your provider may have ordered immunizations, labs, imaging, and/or referrals for you. A list of these orders (if applicable) as well as your Preventive Care list are included within your After Visit Summary for your review. Other Preventive Recommendations:    · A preventive eye exam performed by an eye specialist is recommended every 1-2 years to screen for glaucoma; cataracts, macular degeneration, and other eye disorders. · A preventive dental visit is recommended every 6 months. · Try to get at least 150 minutes of exercise per week or 10,000 steps per day on a pedometer . · Order or download the FREE \"Exercise & Physical Activity: Your Everyday Guide\" from [employer]. Call 3-947.822.5255 or search [employer] online. · You need 4190-5804 mg of calcium and 9862-2952 IU of vitamin D per day. It is possible to meet your calcium requirement with diet alone, but a vitamin D supplement is usually necessary to meet this goal.  · When exposed to the sun, use a sunscreen that protects against both UVA and UVB radiation with an SPF of 30 or greater. Reapply every 2 to 3 hours or after sweating, drying off with a towel, or swimming. · Always wear a seat belt when traveling in a car. Always wear a helmet when riding a bicycle or motorcycle. Heart-Healthy Diet   Sodium, Fat, and Cholesterol Controlled Diet       What Is a Heart Healthy Diet?    A heart-healthy diet is one that limits sodium , certain types of fat , and cholesterol . This type of diet is recommended for:   People with any form of cardiovascular disease (eg, coronary heart disease , peripheral vascular disease , previous heart attack , previous stroke )   People with risk factors for cardiovascular disease, such as high blood pressure , high cholesterol , or diabetes   Anyone who wants to lower their risk of developing cardiovascular disease   Sodium    Sodium is a mineral found in many foods. In general, most people consume much more sodium than they need. Diets high in sodium can increase blood pressure and lead to edema (water retention). On a heart-healthy diet, you should consume no more than 2,300 mg (milligrams) of sodium per dayabout the amount in one teaspoon of table salt. The foods highest in sodium include table salt (about 50% sodium), processed foods, convenience foods, and preserved foods. Cholesterol    Cholesterol is a fat-like, waxy substance in your blood. Our bodies make some cholesterol. It is also found in animal products, with the highest amounts in fatty meat, egg yolks, whole milk, cheese, shellfish, and organ meats. On a heart-healthy diet, you should limit your cholesterol intake to less than 200 mg per day. It is normal and important to have some cholesterol in your bloodstream. But too much cholesterol can cause plaque to build up within your arteries, which can eventually lead to a heart attack or stroke. The two types of cholesterol that are most commonly referred to are:   Low-density lipoprotein (LDL) cholesterol  Also known as bad cholesterol, this is the cholesterol that tends to build up along your arteries. Bad cholesterol levels are increased by eating fats that are saturated or hydrogenated. Optimal level of this cholesterol is less than 100. Over 130 starts to get risky for heart disease.    High-density lipoprotein (HDL) cholesterol  Also known as good cholesterol, this type of cholesterol actually carries cholesterol away from your arteries and may, therefore, help lower your risk of having a heart attack. You want this level to be high (ideally greater than 60). It is a risk to have a level less than 40. You can raise this good cholesterol by eating olive oil, canola oil, avocados, or nuts. Exercise raises this level, too. Fat    Fat is calorie dense and packs a lot of calories into a small amount of food. Even though fats should be limited due to their high calorie content, not all fats are bad. In fact, some fats are quite healthful. Fat can be broken down into four main types. The good-for-you fats are:   Monounsaturated fat  found in oils such as olive and canola, avocados, and nuts and natural nut butters; can decrease cholesterol levels, while keeping levels of HDL cholesterol high   Polyunsaturated fat  found in oils such as safflower, sunflower, soybean, corn, and sesame; can decrease total cholesterol and LDL cholesterol   Omega-3 fatty acids  particularly those found in fatty fish (such as salmon, trout, tuna, mackerel, herring, and sardines); can decrease risk of arrhythmias, decrease triglyceride levels, and slightly lower blood pressure   The fats that you want to limit are:   Saturated fat  found in animal products, many fast foods, and a few vegetables; increases total blood cholesterol, including LDL levels   Animal fats that are saturated include: butter, lard, whole-milk dairy products, meat fat, and poultry skin   Vegetable fats that are saturated include: hydrogenated shortening, palm oil, coconut oil, cocoa butter   Hydrogenated or trans fat  found in margarine and vegetable shortening, most shelf stable snack foods, and fried foods; increases LDL and decreases HDL     It is generally recommended that you limit your total fat for the day to less than 30% of your total calories.  If you follow an 1800-calorie heart healthy diet, for and cholesterol amounts. For products low in sodium, look for sodium free, very low sodium, low sodium, no added salt, and unsalted   Skip the salt when cooking or at the table; if food needs more flavor, get creative and try out different herbs and spices. Garlic and onion also add substantial flavor to foods. Trim any visible fat off meat and poultry before cooking, and drain the fat off after grijalva. Use cooking methods that require little or no added fat, such as grilling, boiling, baking, poaching, broiling, roasting, steaming, stir-frying, and sauting. Avoid fast food and convenience food. They tend to be high in saturated and trans fat and have a lot of added salt. Talk to a registered dietitian for individualized diet advice. Last Reviewed: March 2011 Jalil Mock MS, MPH, RD   Updated: 3/29/2011   ·     Heart-Healthy Diet   Sodium, Fat, and Cholesterol Controlled Diet       What Is a Heart Healthy Diet? A heart-healthy diet is one that limits sodium , certain types of fat , and cholesterol . This type of diet is recommended for:   People with any form of cardiovascular disease (eg, coronary heart disease , peripheral vascular disease , previous heart attack , previous stroke )   People with risk factors for cardiovascular disease, such as high blood pressure , high cholesterol , or diabetes   Anyone who wants to lower their risk of developing cardiovascular disease   Sodium    Sodium is a mineral found in many foods. In general, most people consume much more sodium than they need. Diets high in sodium can increase blood pressure and lead to edema (water retention). On a heart-healthy diet, you should consume no more than 2,300 mg (milligrams) of sodium per dayabout the amount in one teaspoon of table salt. The foods highest in sodium include table salt (about 50% sodium), processed foods, convenience foods, and preserved foods.    Cholesterol    Cholesterol is a fat-like, waxy substance in your blood. Our bodies make some cholesterol. It is also found in animal products, with the highest amounts in fatty meat, egg yolks, whole milk, cheese, shellfish, and organ meats. On a heart-healthy diet, you should limit your cholesterol intake to less than 200 mg per day. It is normal and important to have some cholesterol in your bloodstream. But too much cholesterol can cause plaque to build up within your arteries, which can eventually lead to a heart attack or stroke. The two types of cholesterol that are most commonly referred to are:   Low-density lipoprotein (LDL) cholesterol  Also known as bad cholesterol, this is the cholesterol that tends to build up along your arteries. Bad cholesterol levels are increased by eating fats that are saturated or hydrogenated. Optimal level of this cholesterol is less than 100. Over 130 starts to get risky for heart disease. High-density lipoprotein (HDL) cholesterol  Also known as good cholesterol, this type of cholesterol actually carries cholesterol away from your arteries and may, therefore, help lower your risk of having a heart attack. You want this level to be high (ideally greater than 60). It is a risk to have a level less than 40. You can raise this good cholesterol by eating olive oil, canola oil, avocados, or nuts. Exercise raises this level, too. Fat    Fat is calorie dense and packs a lot of calories into a small amount of food. Even though fats should be limited due to their high calorie content, not all fats are bad. In fact, some fats are quite healthful. Fat can be broken down into four main types.    The good-for-you fats are:   Monounsaturated fat  found in oils such as olive and canola, avocados, and nuts and natural nut butters; can decrease cholesterol levels, while keeping levels of HDL cholesterol high   Polyunsaturated fat  found in oils such as safflower, sunflower, soybean, corn, and sesame; can decrease total cholesterol and LDL cholesterol   Omega-3 fatty acids  particularly those found in fatty fish (such as salmon, trout, tuna, mackerel, herring, and sardines); can decrease risk of arrhythmias, decrease triglyceride levels, and slightly lower blood pressure   The fats that you want to limit are:   Saturated fat  found in animal products, many fast foods, and a few vegetables; increases total blood cholesterol, including LDL levels   Animal fats that are saturated include: butter, lard, whole-milk dairy products, meat fat, and poultry skin   Vegetable fats that are saturated include: hydrogenated shortening, palm oil, coconut oil, cocoa butter   Hydrogenated or trans fat  found in margarine and vegetable shortening, most shelf stable snack foods, and fried foods; increases LDL and decreases HDL     It is generally recommended that you limit your total fat for the day to less than 30% of your total calories. If you follow an 1800-calorie heart healthy diet, for example, this would mean 60 grams of fat or less per day. Saturated fat and trans fat in your diet raises your blood cholesterol the most, much more than dietary cholesterol does. For this reason, on a heart-healthy diet, less than 7% of your calories should come from saturated fat and ideally 0% from trans fat. On an 1800-calorie diet, this translates into less than 14 grams of saturated fat per day, leaving 46 grams of fat to come from mono- and polyunsaturated fats.    Food Choices on a Heart Healthy Diet   Food Category   Foods Recommended   Foods to Avoid   Grains   Breads and rolls without salted tops Most dry and cooked cereals Unsalted crackers and breadsticks Low-sodium or homemade breadcrumbs or stuffing All rice and pastas   Breads, rolls, and crackers with salted tops High-fat baked goods (eg, muffins, donuts, pastries) Quick breads, self-rising flour, and biscuit mixes Regular bread crumbs Instant hot cereals Commercially prepared rice, pasta, or stuffing mixes people in the world. The percentage of obese people in the [place] has risen steadily. Many people find that although they initially lose weight by dieting, they quickly regain the weight after the diet ends. Because it so hard to keep weight off over time, it is important to have as much information and support as possible before starting a diet. You are most likely to be successful in losing weight and keeping it off when you believe that your body weight can be controlled. STARTING A WEIGHT LOSS PROGRAM -- Some people like to talk to their doctor or nurse to get help choosing the best plan, monitoring progress, and getting advice and support along the way. To know what treatment (or combination of treatments) will work best, determine your body mass index (BMI) and waist circumference (measurement). The BMI is calculated from your height and weight. A person with a BMI between 25 and 29.9 is considered overweight   A person with a BMI of 30 or greater is considered to be obese  A waist circumference greater than 35 inches (88 cm) in women and 40 inches (102 cm) in men increases the risk of obesity-related complications, such as heart disease and diabetes. People who are obese and who have a larger waist size may need more aggressive weight loss treatment than others. Talk to your doctor or nurse for advice. Types of treatment -- Based on your measurements and your medical history, your doctor or nurse can determine what combination of weight loss treatments would work best for you. Treatments may include changes in lifestyle, exercise, dieting, and, in some cases, weight loss medicines or weight loss surgery. Weight loss surgery, also called bariatric surgery, is reserved for people with severe obesity who have not responded to other weight loss treatments.    SETTING A WEIGHT LOSS GOAL -- It is important to set a realistic weight loss goal. Your first goal should be to avoid gaining more weight and eating behaviors can help you to develop better habits. This is not a reward for weight loss; instead, it is a reward for changing unhealthy behaviors. Do not use food as a reward. Some people find money, clothing, or personal care (eg, a hair cut, manicure, or massage) to be effective rewards. Treat yourself immediately after making better eating choices to reinforce the value of the good behavior. You need to have clear behavior goals, and you must have a time frame for reaching your goals. Reward small changes along the way to your final goal.  Other factors that contribute to successful weight loss -- Changing your behavior involves more than just changing unhealthy eating habits; it also involves finding people around you to support your weight loss, reducing stress, and learning to be strong when tempted by food. Establish a \"corey\" system -- Having a friend or family member available to provide support and reinforce good behavior is very helpful. The support person needs to understand your goals. Learn to be strong -- Learning to be strong when tempted by food is an important part of losing weight. As an example, you will need to learn how to say \"no\" and continue to say no when urged to eat at parties and social gatherings. Develop strategies for events before you go, such as eating before you go or taking low-calorie snacks and drinks with you. Develop a support system -- Having a support system is helpful when losing weight. This is why many [email or web address] groups are successful. Family support is also essential; if your family does not support your efforts to lose weight, this can slow your progress or even keep you from losing weight. Positive thinking -- People often have conversations with themselves in their head; these conversations can be positive or negative. If you eat a piece of cake that was not planned, you may respond by thinking, \"Oh, you stupid idiot, you've blown your diet! \" and as a meal-replacement drink or a frozen low-calorie (250 to 350 calories) meal for lunch   A frozen low-calorie meal or other prepackaged, calorie-controlled meal, along with extra vegetables for dinner  This would give you 1000 to 1500 calories per day. Low-fat diet -- To reduce the amount of fat in your diet, you can:  Eat low-fat foods. Low-fat foods are those that contain less than 30 percent of calories from fat. Fat is listed on the food facts label (figure 1). Count fat grams. For a 1500 calorie diet, this would mean about 45 g or fewer of fat per day. Low-carbohydrate diet -- Low- and very-low-carbohydrate diets (eg, Atkins diet, [employer]) have become popular ways to lose weight quickly. With a very-low-carbohydrate diet, you eat between 0 and 60 grams of carbohydrates per day (a standard diet contains 200 to 300 grams of carbohydrates)   With a low-carbohydrate diet, you eat between 60 and 130 grams of carbohydrates per day  Carbohydrates are found in fruits, vegetables, and grains (including breads, rice, pasta, and cereal), alcoholic beverages, and in dairy products. Meat and fish do not contain carbohydrates. Side effects of very-low-carbohydrate diets can include constipation, headache, bad breath, muscle cramps, diarrhea, and weakness. Mediterranean diet -- The term \"Mediterranean diet\" refers to a way of eating that is common in olive[place]growing regions around the [facility]. Although there is some variation in Mediterranean diets, there are some similarities. Most Mediterranean diets include:  A high level of monounsaturated fats (from olive or canola oil, walnuts, pecans, almonds) and a low level of saturated fats (from butter)   A high amount of vegetables, fruits, legumes, and grains (7 to 10 servings of fruits and vegetables per day)   A moderate amount of milk and dairy products, mostly in the form of cheese. Use low-fat dairy products (skim milk, fat-free yogurt, low-fat cheese). have other medical problems, such as diabetes, high cholesterol, or high blood pressure  Two weight loss medicines are approved in the [place] for long-term use. These are sibutramine and orlistat. Other weight loss medicines (phentermine, diethylpropion) are available but are only approved for short-term use (up to 12 weeks). Sibutramine -- Sibutramine (Meridia®, Reductil®) is a medicine that reduces your appetite. In people who take the medicine for one year, the average weight loss is 10 percent of the initial body weight (5 percent more than those who took a placebo treatment). Side effects of sibutramine include insomnia, dry mouth, and constipation. Increases in blood pressure can occur. Therefore, blood pressure is usually monitored during treatment. There is no evidence that sibutramine causes heart or lung problems (like dexfenfluramine and fenfluramine (Phen/Fen)). However, experts agree that sibutramine should not used by people with coronary heart disease, heart failure, uncontrolled hypertension, stroke, irregular heart rhythms, or peripheral vascular disease (poor circulation in the legs). Orlistat -- Orlistat (Xenical® 120 mg capsules) is a medicine that reduces the amount of fat your body absorbs from the foods you eat. A lower-dose version is now available without a prescription (Mt® 60 mg capsules) in many countries, including the [place]. The medicine is recommended three times per day, taken with a meal; you can skip a dose if you skip a meal or if the meal contains no fat. After one year of treatment with orlistat, the average weight loss is approximately 8 to 10 percent of initial body weight (4 percent more than in those who took a placebo). Cholesterol levels often improve, and blood pressure sometimes falls. In people with diabetes, orlistat may help control blood sugar levels.   Side effects occur in 15 to 10 percent of people and may include stomach cramps, gas, diarrhea, leakage of stool, or oily stools. These problems are more likely when you take orlistat with a high-fat meal (if more than 30 percent of calories in the meal are from fat). Side effects usually improve as you learn to avoid high-fat foods. Severe liver injury has been reported rarely in patients taking orlistat, but it is not known if orlistat caused the liver problems. Diet supplements -- Diet supplements are widely used by people who are trying to lose weight, although the safety and efficacy of these supplements are often unproven. A few of the more common diet supplements are discussed below; none of these are recommended because they have not been studied carefully, and there is no proof they are safe or effective. Chitosan and wheat dextrin are ineffective for weight loss, and their use is not recommended. Ephedra, a compound related to ephedrine, is no longer available in the [place] due to safety concerns. Many nonprescription diet pills previously contained ephedra. Although some studies have shown that ephedra helps with weight loss, there can be serious side effects (psychiatric symptoms, palpitations, and stomach upset), including death. There are not enough data about the safety and efficacy of chromium, ginseng, glucomannan, green tea, hydroxycitric acid, L carnitine, psyllium, pyruvate supplements, [place] wort, and conjugated linoleic acid. Two supplements from [facility], [address] [place] (also known as the Zander Kwok 15 pill) and Herbathin dietary supplement, have been shown to contain prescription drugs. Hoodia gordonii is a dietary supplement derived from a plant in [place]. It is not recommended because there is no proof that it is safe or effective. Bitter orange (Citrus aurantium) can increase your heart rate and blood pressure and is not recommended.   SHOULD I HAVE SURGERY TO LOSE WEIGHT? -- Weight loss surgery is recommended ONLY for people with one of the following:  Severe obesity (body mass index above 40) (calculator 1 and calculator 2) who have not responded to diet, exercise, or weight loss medicines   Body mass index between 35 and 40, along with a serious medical problem (including diabetes, severe joint pain, or sleep apnea) that would improve with weight loss  You should be sure that you understand the potential risks and benefits of weight loss surgery. You must be motivated and willing to make lifelong changes in how you eat to reach and maintain a healthier weight after surgery. You must also be realistic about weight loss after surgery (see 'Effectiveness of weight loss surgery' below). PREPARING FOR WEIGHT LOSS SURGERY -- Most people who have weight loss surgery will meet with several specialists before surgery is scheduled. This often includes a dietitian, mental health counselor, a doctor who specializes in care of obese people, and a surgeon who performs weight loss surgery (bariatric surgeon). You may need to work with these providers for several weeks or months before surgery. The nutritionist will explain what and how much you will be able to eat after surgery. You may also need to lose a small amount of weight before surgery. The mental health specialist will help you to cope with stress and other factors that can make it harder to lose weight or trigger you to eat   The medical doctor will determine whether you need other tests, counseling, or treatment before surgery. He or she might also help you begin a medical weight loss program so that you can lose some weight before surgery. The bariatric surgeon will meet with you to discuss the surgeries available to treat obesity. He or she will also make sure you are a good candidate for surgery. TYPES OF WEIGHT LOSS SURGERY -- There are several types of weight loss surgeries, the most common being lap banding, gastric bypass, and gastric sleeve.   Lap banding -- Laparoscopic adjustable gastric stretch, allowing you to eat more food. The body absorbs fewer calories, since food bypasses most of the stomach as well as the upper small intestine. This new arrangement seems to decrease your appetite and change how you break down foods by changing the release of various hormones. Gastric bypass can be performed as open surgery (through an incision on the abdomen) or laparoscopically, which uses smaller incisions and smaller instruments. Both the laparoscopic and open techniques have risks and benefits. You and your surgeon should work together to decide which surgery, if any, is right for you. Gastric bypass has a high success rate, and people lose an average of 62 to 68 percent of their excess body weight in the first year. Weight loss typically levels off after one to two years, with an overall excess weight loss between 50 and 75 percent. For a person who is 120 pounds overweight, an average of 60 to 90 pounds of weight loss would be expected. Gastric sleeve -- Gastric sleeve, also known as sleeve gastrectomy, is a surgery that reduces the size of the stomach and makes it into a narrow tube (figure 3). The new stomach is much smaller and produces less of the hormone (ghrelin) that causes hunger, helping you feel satisfied with less food. Sleeve gastrectomy is safer than gastric bypass because the intestines are not rearranged, and there is less chance of malnutrition. It also appears to control hunger better than lap banding. It might be safer than the lap banding because no foreign materials are used. The gastric sleeve has a good success rate, and people lose an average of 33 percent of their excess body weight in the first year. For a person who is 120 pounds overweight, this would mean losing about 40 pounds in the first year. WEIGHT LOSS SURGERY COMPLICATIONS -- A variety of complications can occur with weight loss surgery.  The risks of surgery depend upon which surgery you have and any medical problems you had before surgery. Some of the more common early surgical complications (one to six weeks after surgery) include:  Bleeding   Infection   Blockage or tear in the bowels   Need for further surgery  Important medical complications after surgery can include blood clots in the legs or lungs, heart attack, pneumonia, and urinary tract infection. Complications are less likely when surgery is performed in centers that are experienced in weight loss surgery. In general, centers with experience in weight loss surgery have:  Board-certified doctors and surgeons   A team of support staff (dietitians, counselors, nurses)   Long-term follow-up after surgery   Hospital staff experienced with the care of weight loss patients. This includes nurses who are trained in the care of patients immediately after surgery and anesthesiologists who are experienced in caring for the morbidly obese. EFFECTIVENESS OF WEIGHT LOSS SURGERY -- The goal of weight loss surgery is to reduce the risk of illness or death associated with obesity. Weight loss surgery can also help you to feel and look better, reduce the amount of money you spend on medicines, and cut down on sick days. As an example, weight loss surgery can improve health problems related to obesity (diabetes, high blood pressure, high cholesterol, sleep apnea) to the point that you need less or no medicine. Finally, weight loss surgery might reduce your risk of developing heart disease, cancer, and certain infections. AFTER WEIGHT LOSS SURGERY -- You will need to stay in the hospital until your team feels that it is safe for you to leave (on average, one to three days). Do not drive if you are taking prescription pain medicine. Begin exercising as soon as possible once you have healed; most weight loss centers will design an exercise program for you. Once you are home, it is important to eat and drink exactly what your doctor and dietitian recommend.  You will see your doctor, nurse, and dietitian on a regular basis after surgery to monitor your health, diet, and weight loss. You will be able to slowly increase how much you eat over time, although it will always be important to:  Eat small, frequent meals and not skip meals   Chew your food slowly and completely   Avoid eating while \"distracted\" (such as eating while watching TV)   Stop eating when you feel full   Drink liquids at least 30 minutes before or after eating   Avoid foods high in fat or sugar   Take vitamin supplements, as recommended  It can take several months to learn to listen to your body so that you know when you are hungry and when you are full. You may dislike foods you previously loved, and you may begin to prefer new foods. This can be a frustrating process for some people, so talk to your dietitian if you are having trouble. It usually takes between one and two years to lose weight after surgery. After reaching their goal weight, some people have plastic surgery (called \"body contouring\") to remove excess skin from the body, particularly in the abdominal area. Before you decide to have weight loss surgery, you must commit to staying healthy for life. This includes following up with your healthcare team, exercising most days of the week, and eating a sensible diet every day. It can be difficult to develop new eating and exercise habits after weight loss surgery, and you will have to work hard to stick to your goals. Recovering from surgery and losing weight can be stressful and emotional, and it is important to have the support of family and friends. Working with a , therapist, or support group can help you through the ups and downs. WHERE TO GET MORE INFORMATION -- Your healthcare provider is the best source of information for questions and concerns related to your medical problem.   This article will be updated as needed every four months on our Web site dangling your legs for a minute or two before rising to a standing position. Overall Home Safety Check   According to the Consumer Product Safety Commision's \"Older Consumer Home Safety Checklist,\" it is important to check for potential hazards in each room. And remember, proper lighting is an essential factor in home safety. If you cannot see clearly, you are more likely to fall. Important questions to ask yourself include:   Are lamp, electric, extension, and telephone cords placed out of the flow of traffic and maintained in good condition? Have frayed cords been replaced? Are all small rugs and runners slip resistant? If not, you can secure them to the floor with a special double-sided carpet tape. Are smoke detectors properly locatedone on every floor of your home and one outside of every sleeping area? Are they in good working order? Are batteries replaced at least once a year? Do you have a well-maintained carbon monoxide detector outside every sleeping are in your home? Does your furniture layout leave plenty of space to maneuver between and around chairs, tables, beds, and sofas? Are hallways, stairs and passages between rooms well lit? Can you reach a lamp without getting out of bed? Are floor surfaces well maintained? Shag rugs, high-pile carpeting, tile floors, and polished wood floors can be particularly slippery. Stairs should always have handrails and be carpeted or fitted with a non-skid tread. Is your telephone easily reachable. Is the cord safely tucked away? Room by Room   According to the Association of Aging, bathrooms and jack are the two most potentially hazardous rooms in your home. In the Kitchen    Be sure your stove is in proper working order and always make sure burners and the oven are off before you go out or go to sleep. Keep pots on the back burners, turn handles away from the front of the stove, and keep stove clean and free of grease build-up.     Kitchen ventilation systems and range exhausts should be working properly. Keep flammable objects such as towels and pot holders away from the cooking area except when in use. Make sure kitchen curtains are tied back. Move cords and appliances away from the sink and hot surfaces. If extension cords are needed, install wiring guides so they do not hang over the sink, range, or working areas. Look for coffee pots, kettles and toaster ovens with automatic shut-offs. Keep a mop handy in the kitchen so you can wipe up spills instantly. You should also have a small fire extinguisher. Arrange your kitchen with frequently used items on lower shelves to avoid the need to stand on a stepstool to reach them. Make sure countertops are well-lit to avoid injuries while cutting and preparing food. In the Bathroom    Use a non-slip mat or decals in the tub and shower, since wet, soapy tile or porcelain surfaces are extremely slippery. Make sure bathroom rugs are non-skid or tape them firmly to the floor. Bathtubs should have at least one, preferably two, grab bars, firmly attached to structural supports in the wall. (Do not use built-in soap holders or glass shower doors as grab bars.)    Tub seats fitted with non-slip material on the legs allow you to wash sitting down. For people with limited mobility, bathtub transfer benches allow you to slide safely into the tub. Raised toilet seats and toilet safety rails are helpful for those with knee or hip problems. In the [facility]    Make sure you use a nightlight and that the area around your bed is clear of potential obstacles. Be careful with electric blankets and never go to sleep with a heating pad, which can cause serious burns even if on a low setting. Use fire-resistant mattress covers and pillows, and NEVER smoke in bed. Keep a phone next to the bed that is programmed to dial 911 at the push of a button.       If you have a chronic condition, you may Planning: Care Instructions  Your Care Instructions    It can be hard to live with an illness that cannot be cured. But if your health is getting worse, you may want to make decisions about end-of-life care. Planning for the end of your life does not mean that you are giving up. It is a way to make sure that your wishes are met. Clearly stating your wishes can make it easier for your loved ones. Making plans while you are still able may also ease your mind and make your final days less stressful and more meaningful. Follow-up care is a key part of your treatment and safety. Be sure to make and go to all appointments, and call your doctor if you are having problems. It's also a good idea to know your test results and keep a list of the medicines you take. What can you do to plan for the end of life? You can bring these issues up with your doctor. You do not need to wait until your doctor starts the conversation. You might start with \"I would not be willing to live with . Emilia Arana \" When you complete this sentence it helps your doctor understand your wishes. Talk openly and honestly with your doctor. This is the best way to understand the decisions you will need to make as your health changes. Know that you can always change your mind. Ask your doctor about commonly used life-support measures. These include tube feedings, breathing machines, and fluids given through a vein (IV). Understanding these treatments will help you decide whether you want them. You may choose to have these life-supporting treatments for a limited time. This allows a trial period to see whether they will help you. You may also decide that you want your doctor to take only certain measures to keep you alive. It is important to spell out these conditions so that your doctor and family understand them. Talk to your doctor about how long you are likely to live.  He or she may be able to give you an idea of what usually happens with your specific Somewhere else? Would you prefer to be buried or cremated? Do you want your organs to be donated after you die? What should you do with your living will? Make sure that your family members and your health care agent have copies of your living will. Give your doctor a copy of your living will to keep in your medical record. If you have more than one doctor, make sure that each one has a copy. You may want to put a copy of your living will where it can be easily found. Where can you learn more? Go to [email or web address]://[email or web address] org and sign in to your Fl[employer] account. Enter E789 in the Yand[employer] box to learn more about \"Learning About Living Perroy. \"     If you do not have an account, please click on the \"Sign Up Now\" link. Current as of: April 1, 2019  Content Version: 12.1  © 7801-4026 [employer], [employer]. Care instructions adapted under license by [facility] ([facility]). If you have questions about a medical condition or this instruction, always ask your healthcare professional. [address] any warranty or liability for your use of this information. ·        Learning About Durable Power of  for Health Care  What is a durable power of  for health care? A durable power of  for health care is one type of the legal forms called advance directives. It lets you decide who you want to make treatment decisions for you if you cannot speak or decide for yourself. The person you choose is called your health care agent. Another type of advance directive is a living will. It lets you write down what kinds of treatment or life support you want or do not want. What should you think about when choosing a health care agent? Choose your health care agent carefully. This person may or may not be a family member. Talk to the person before you make your final decision. Make sure he or she is comfortable with this responsibility.   It's a good idea to choose someone who:  Is at least 25years old. Knows you well and understands what makes life meaningful for you. Understands your [employer] and moral values. Will do what you want, not what he or she wants. Will be able to make difficult choices at a stressful time. Will be able to refuse or stop treatment, if that is what you would want, even if you could die. Will be firm and confident with health professionals if needed. Will ask questions to get necessary information. Lives near you or agrees to travel to you if needed. Your family may help you make medical decisions while you can still be part of that process. But it is important to choose one person to be your health care agent in case you are not able to make decisions for yourself. If you don't fill out the legal form and name a health care agent, the decisions your family can make may be limited. Who will make decisions for you if you do not have a health care agent? If you don't have a health care agent or a living will, your family members may disagree about your medical care. And then some medical professionals who may not know you as well might have to make decisions for you. In some cases, a  makes the decisions. When you name a health care agent, it is very clear who has the power to make health decisions for you. How do you name a health care agent? You name your health care agent on a legal form. It is usually called a durable power of  for health care. Ask your hospital, state bar association, or office on aging where to find these forms. You must sign the form to make it legal. Some states require you to get the form notarized. This means that a person called a  watches you sign the form and then he or she signs the form. Some states also require that two or more witnesses sign the form. Be sure to tell your family members and doctors who your health care agent is. Keep your forms in a safe place.  But

## 2020-01-02 NOTE — PROGRESS NOTES
MD Chad   donepezil (ARICEPT) 10 MG tablet Take 10 mg by mouth 2 times daily Yes Historical Provider, MD   ELIQUIS 5 MG TABS tablet  Yes Historical Provider, MD   torsemide (DEMADEX) 20 MG tablet  Yes Historical Provider, MD   primidone (MYSOLINE) 50 MG tablet Take 1 tablet by mouth 2 times daily  Patient taking differently: Take 50 mg by mouth 3 times daily  Yes Maria Fernanda Olson MD   aspirin 81 MG EC tablet Take 81 mg by mouth daily Patient is taking once a week Yes Historical Provider, MD   carbidopa-levodopa (SINEMET)  MG per tablet take 1 tablet by mouth every morning 4 days then twice a day 6 HOURS APART  Historical Provider, MD   fluticasone (FLONASE) 50 MCG/ACT nasal spray 2 sprays by Nasal route daily  Glo Franklin MD         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    CAD (coronary artery disease)     status post stents in 2006 and 2008    Cervical stenosis of spinal canal     status post laminectomies in cervical spine    CHF (congestive heart failure) ([address] 6/6/2017    Diverticulosis     colonoscopy 9/2016    Diverticulosis of colon 6/6/2017    Fracture of rib of right side     Hernia     umbilical hernia repair    History of TIA (transient ischemic attack) 11/16/2017    HTN (hypertension) 8/26/2014    Hyperlipidemia     Hypertension     Iron deficiency anemia     Melanoma ([address]     right arm 2004    Memory loss 3/4/2014    Mild persistent asthma without complication 5/1/4605    Osteoarthritis     Paroxysmal atrial fibrillation ([address] 11/16/2017    Renal insufficiency     Status post total knee replacement 1/4/2012    Thoracic spine pain     chronic    TIA (transient ischemic attack) 1/16/2017    Tremor     chronic    Ulcerative colitis ([address] 1/20/2017       Past Surgical History:   Procedure Laterality Date    [facility]COPY  2005     [facility]    COLONOSCOPY  09/16/2016    polyp, Health:  General  In general, how would you say your health is?: Fair  In the past 7 days, have you experienced any of the following? New or Increased Pain, New or Increased Fatigue, Loneliness, Social Isolation, Stress or Anger?: (!) New or Increased Fatigue, New or Increased Pain  Do you get the social and emotional support that you need?: Yes  Do you have a Living Will?: (!) No  General Health Risk Interventions:  · Pain issues: right shoulder pain, chronic   · No Living Will: additional information provided in office    Health Habits/Nutrition:  Health Habits/Nutrition  Do you exercise for at least 20 minutes 2-3 times per week?: (!) No  Have you lost any weight without trying in the past 3 months?: No  Do you eat fewer than 2 meals per day?: No  Have you seen a dentist within the past year?: (!) No  Body mass index is 28.63 kg/m².   Health Habits/Nutrition Interventions:  · Inadequate physical activity:  educational materials provided to promote increased physical activity  · Nutritional issues:  educational materials for healthy, well-balanced diet provided  · Dental exam overdue:  patient encouraged to make appointment with his/her dentist    Hearing/Vision:  No exam data present  Hearing/Vision  Do you or your family notice any trouble with your hearing?: (!) Yes(Sometimes)  Do you have difficulty driving, watching TV, or doing any of your daily activities because of your eyesight?: No  Have you had an eye exam within the past year?: Yes  Hearing/Vision Interventions:  · Hearing concerns:  patient declines any further evaluation/treatment for hearing issues    Safety:  Safety  Do you have working smoke detectors?: Yes  Have all throw rugs been removed or fastened?: (!) No  Do you have non-slip mats or surfaces in all bathtubs/showers?: (!) No  Do all of your stairways have a railing or banister?: Yes  Are your doorways, halls and stairs free of clutter?: Yes  Do you always fasten your seatbelt when you are in potassium chloride (KLOR-CON M) 20 MEQ extended release tablet; Take 1 tablet by mouth daily  -     CBC Auto Differential; Future  -     Comprehensive Metabolic Panel; Future    Chronic congestive heart failure, unspecified heart failure type ([facility]  -     CBC Auto Differential; Future  -     Comprehensive Metabolic Panel; Future    Hyperlipidemia, unspecified hyperlipidemia type  -     atorvastatin (LIPITOR) 10 MG tablet; Take 1 tablet by mouth daily  -     Comprehensive Metabolic Panel; Future  -     Lipid Panel; Future    Coronary artery disease involving native coronary artery of native heart without angina pectoris  -     Lipid Panel;  Future

## 2020-01-09 ENCOUNTER — TELEPHONE (OUTPATIENT)
Dept: FAMILY MEDICINE CLINIC | Age: 80
End: 2020-01-09

## 2020-01-09 ENCOUNTER — HOSPITAL ENCOUNTER (OUTPATIENT)
Dept: LAB | Age: 80
Discharge: HOME | End: 2020-01-09
Payer: MEDICARE

## 2020-01-09 LAB
ALBUMIN SERPL-MCNC: 4 G/DL (ref 3.5–4.6)
ALP BLD-CCNC: 98 U/L (ref 35–104)
ALT SERPL-CCNC: 19 U/L (ref 0–41)
ANION GAP SERPL CALCULATED.3IONS-SCNC: 11 MEQ/L (ref 9–15)
AST SERPL-CCNC: 22 U/L (ref 0–40)
BASOPHILS ABSOLUTE: 0 K/UL (ref 0–0.2)
BASOPHILS RELATIVE PERCENT: 0.6 %
BILIRUB SERPL-MCNC: 0.4 MG/DL (ref 0.2–0.7)
BUN BLDV-MCNC: 25 MG/DL (ref 8–23)
CALCIUM SERPL-MCNC: 8.8 MG/DL (ref 8.5–9.9)
CHLORIDE BLD-SCNC: 102 MEQ/L (ref 95–107)
CHOLESTEROL, TOTAL: 137 MG/DL (ref 0–199)
CO2: 30 MEQ/L (ref 20–31)
CREAT SERPL-MCNC: 1.22 MG/DL (ref 0.7–1.2)
EOSINOPHILS ABSOLUTE: 0.2 K/UL (ref 0–0.7)
EOSINOPHILS RELATIVE PERCENT: 3.1 %
GFR AFRICAN AMERICAN: >60
GFR NON-AFRICAN AMERICAN: 56.9
GLOBULIN: 3 G/DL (ref 2.3–3.5)
GLUCOSE BLD-MCNC: 88 MG/DL (ref 70–99)
HCT VFR BLD CALC: 42.2 % (ref 42–52)
HDLC SERPL-MCNC: 53 MG/DL (ref 40–59)
HEMOGLOBIN: 13.8 G/DL (ref 14–18)
LDL CHOLESTEROL CALCULATED: 68 MG/DL (ref 0–129)
LYMPHOCYTES ABSOLUTE: 0.8 K/UL (ref 1–4.8)
LYMPHOCYTES RELATIVE PERCENT: 10.3 %
MCH RBC QN AUTO: 32.4 PG (ref 27–31.3)
MCHC RBC AUTO-ENTMCNC: 32.6 % (ref 33–37)
MCV RBC AUTO: 99.5 FL (ref 80–100)
MONOCYTES ABSOLUTE: 0.6 K/UL (ref 0.2–0.8)
MONOCYTES RELATIVE PERCENT: 7.6 %
NEUTROPHILS ABSOLUTE: 6.3 K/UL (ref 1.4–6.5)
NEUTROPHILS RELATIVE PERCENT: 78.4 %
PDW BLD-RTO: 14 % (ref 11.5–14.5)
PLATELET # BLD: 215 K/UL (ref 130–400)
POTASSIUM SERPL-SCNC: 4.8 MEQ/L (ref 3.4–4.9)
RBC # BLD: 4.25 M/UL (ref 4.7–6.1)
SODIUM BLD-SCNC: 143 MEQ/L (ref 135–144)
TOTAL PROTEIN: 7 G/DL (ref 6.3–8)
TRIGL SERPL-MCNC: 81 MG/DL (ref 0–150)
WBC # BLD: 8 K/UL (ref 4.8–10.8)

## 2020-01-09 PROCEDURE — 80053 COMPREHEN METABOLIC PANEL: CPT

## 2020-01-09 PROCEDURE — 85025 COMPLETE CBC W/AUTO DIFF WBC: CPT

## 2020-01-09 PROCEDURE — 36415 COLL VENOUS BLD VENIPUNCTURE: CPT

## 2020-01-09 PROCEDURE — 80061 LIPID PANEL: CPT

## 2020-01-09 PROCEDURE — 84443 ASSAY THYROID STIM HORMONE: CPT

## 2020-01-09 NOTE — TELEPHONE ENCOUNTER
[facility] called for lab order for thyroid, they did draw an additional tube, we just need to get lab order before the end of the day.

## 2020-01-10 LAB — TSH SERPL DL<=0.05 MIU/L-ACNC: 3.14 UIU/ML (ref 0.44–3.86)

## 2020-01-10 NOTE — TELEPHONE ENCOUNTER
Order left in chart. I'm ok with ordering it since he has a history of atrial fibrillation, but who is requesting it? Patient is not on thyroid medication and had normal thyroid testing in the past year.

## 2020-02-03 ENCOUNTER — OFFICE VISIT (OUTPATIENT)
Dept: FAMILY MEDICINE CLINIC | Age: 80
End: 2020-02-03
Payer: MEDICARE

## 2020-02-03 VITALS
BODY MASS INDEX: 27.78 KG/M2 | RESPIRATION RATE: 16 BRPM | HEART RATE: 60 BPM | SYSTOLIC BLOOD PRESSURE: 136 MMHG | WEIGHT: 177 LBS | TEMPERATURE: 97.9 F | DIASTOLIC BLOOD PRESSURE: 80 MMHG | HEIGHT: 67 IN

## 2020-02-03 PROCEDURE — 99213 OFFICE O/P EST LOW 20 MIN: CPT | Performed by: FAMILY MEDICINE

## 2020-02-03 RX ORDER — GUAIFENESIN 600 MG/1
600 TABLET, EXTENDED RELEASE ORAL 2 TIMES DAILY
Qty: 30 TABLET | Refills: 0 | Status: SHIPPED | OUTPATIENT
Start: 2020-02-03 | End: 2020-02-18

## 2020-02-03 RX ORDER — PREDNISONE 50 MG/1
50 TABLET ORAL DAILY
Qty: 5 TABLET | Refills: 0 | Status: SHIPPED | OUTPATIENT
Start: 2020-02-03 | End: 2020-02-08

## 2020-02-03 RX ORDER — AZITHROMYCIN 250 MG/1
250 TABLET, FILM COATED ORAL SEE ADMIN INSTRUCTIONS
Qty: 6 TABLET | Refills: 0 | Status: SHIPPED | OUTPATIENT
Start: 2020-02-03 | End: 2020-02-08

## 2020-02-03 RX ORDER — BENZONATATE 100 MG/1
100 CAPSULE ORAL 3 TIMES DAILY PRN
Qty: 21 CAPSULE | Refills: 0 | Status: SHIPPED | OUTPATIENT
Start: 2020-02-03 | End: 2020-02-10

## 2020-02-03 ASSESSMENT — ENCOUNTER SYMPTOMS
SHORTNESS OF BREATH: 1
DIARRHEA: 1
SORE THROAT: 0
CHEST TIGHTNESS: 1
WHEEZING: 1
RHINORRHEA: 1
NAUSEA: 0
COUGH: 1
SINUS PRESSURE: 0
TROUBLE SWALLOWING: 0
VOICE CHANGE: 0
ABDOMINAL PAIN: 0
SINUS PAIN: 0
VOMITING: 0

## 2020-02-03 NOTE — PROGRESS NOTES
Subjective:      Patient ID: Best Russell is a 78 y.o. male who presents today for:     Chief Complaint   Patient presents with    URI     Presents today C/O URI SX X Thursday. SX include Cough, Congestion, wheezing, fever, and headache. Has tried OTC Tyenol which only helped with the fever       HPI     Patient presents for acute visit regarding upper respiratory symptoms. He reports a 4 to 5-day history of fever up to 101-102 F, malaise, fatigue, H/A, nasal congestion, rhinitis, productive cough, and wheezing with intermittent chest tightness and dyspnea on exertion. He denies any sore throat, ear pain, sinus pain, abdominal pain, or N/V. He has had intermittent loose stools/diarrhea with no blood/mucus in stool. He has taken tylenol at home with help reducing fever, but no resolution of symptoms. He denies any close sick contacts.      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    CAD (coronary artery disease)     status post stents in 2006 and 2008    Cervical stenosis of spinal canal     status post laminectomies in cervical spine    CHF (congestive heart failure) ([address] 6/6/2017    Diverticulosis     colonoscopy 9/2016    Diverticulosis of colon 6/6/2017    Fracture of rib of right side     Hernia     umbilical hernia repair    History of TIA (transient ischemic attack) 11/16/2017    HTN (hypertension) 8/26/2014    Hyperlipidemia     Hypertension     Iron deficiency anemia     Melanoma ([address]     right arm 2004    Memory loss 3/4/2014    Mild persistent asthma without complication 2/0/6349    Osteoarthritis     Paroxysmal atrial fibrillation ([address] 11/16/2017    Renal insufficiency     Status post total knee replacement 1/4/2012    Thoracic spine pain     chronic    TIA (transient ischemic attack) 1/16/2017    Tremor     chronic    Ulcerative colitis ([address] 1/20/2017     Past Surgical History: Procedure Laterality Date    COLONOSCOPY  2005     [facility]    COLONOSCOPY  09/16/2016    polyp, sigmoid colitis, diverticulosis (DR ROSARIO)    SHOULDER ARTHROPLASTY Left     TOTAL KNEE ARTHROPLASTY Right     TOTAL KNEE ARTHROPLASTY Left     UMBILICAL HERNIA REPAIR      VENTRAL HERNIA REPAIR  01/07/2015     Family History   Adopted: Yes     Social History     Socioeconomic History    Marital status:      Spouse name: Not on file    Number of children: Not on file    Years of education: Not on file    Highest education level: Not on file   Occupational History    Not on file   Social Needs    Financial resource strain: Not on file    Food insecurity:     Worry: Not on file     Inability: Not on file    Transportation needs:     Medical: Not on file     Non-medical: Not on file   Tobacco Use    Smoking status: Never Smoker    Smokeless tobacco: Never Used   Substance and Sexual Activity    Alcohol use: Yes     Comment: occasional    Drug use: No    Sexual activity: Yes     Partners: Female   Lifestyle    Physical activity:     Days per week: Not on file     Minutes per session: Not on file    Stress: Not on file   Relationships    Social connections:     Talks on phone: Not on file     Gets together: Not on file     Attends [employer] service: Not on file     Active member of club or organization: Not on file     Attends meetings of clubs or organizations: Not on file     Relationship status: Not on file    Intimate partner violence:     Fear of current or ex partner: Not on file     Emotionally abused: Not on file     Physically abused: Not on file     Forced sexual activity: Not on file   Other Topics Concern    Not on file   Social History Narrative    Not on file     Current Outpatient Medications on File Prior to Visit   Medication Sig Dispense Refill    Fexofenadine-Pseudoephedrine (ALLEGRA-D 24 HOUR PO) Take by mouth daily      DULERA 200-5 MCG/ACT inhaler   0    azelastine for cough, chest tightness, shortness of breath and wheezing. Cardiovascular: Negative for chest pain, palpitations and leg swelling. Gastrointestinal: Positive for diarrhea. Negative for abdominal pain, nausea and vomiting. Genitourinary: Negative for dysuria and hematuria. Musculoskeletal: Negative for myalgias. Skin: Negative for rash. Neurological: Positive for headaches. Negative for dizziness, syncope and light-headedness. Objective:     /80 (Site: Left Upper Arm, Position: Sitting, Cuff Size: Small Adult)   Pulse 60   Temp 97.9 °F (36.6 °C) (Temporal)   Resp 16   Ht 5' 7\" (1.702 m)   Wt 177 lb (80.3 kg)   BMI 27.72 kg/m²     Physical Exam  Vitals signs reviewed. Constitutional:       General: He is not in acute distress. Appearance: He is not toxic-appearing or diaphoretic. HENT:      Head: Normocephalic and atraumatic. Right Ear: Tympanic membrane, ear canal and external ear normal. No middle ear effusion. Tympanic membrane is not erythematous. Left Ear: Tympanic membrane, ear canal and external ear normal.  No middle ear effusion. Tympanic membrane is not erythematous. Nose: Mucosal edema, congestion and rhinorrhea present. Rhinorrhea is clear. Right Turbinates: Enlarged and swollen. Not pale. Left Turbinates: Enlarged and swollen. Not pale. Right Sinus: No maxillary sinus tenderness or frontal sinus tenderness. Left Sinus: No maxillary sinus tenderness or frontal sinus tenderness. Mouth/Throat:      Lips: Pink. No lesions. Mouth: Mucous membranes are moist. No oral lesions. Tongue: No lesions. Palate: No lesions. Pharynx: Oropharynx is clear. Uvula midline. No oropharyngeal exudate or posterior oropharyngeal erythema. Eyes:      General:         Right eye: No discharge. Left eye: No discharge. Conjunctiva/sclera: Conjunctivae normal.   Neck:      Musculoskeletal: Neck supple.    Cardiovascular: daily as needed for Cough  Dispense: 21 capsule; Refill: 0      Modified Medications    No medications on file          New Prescriptions    AZITHROMYCIN (ZITHROMAX) 250 MG TABLET    Take 1 tablet by mouth See Admin Instructions for 5 days 500mg on day 1 followed by 250mg on days 2 - 5    BENZONATATE (TESSALON) 100 MG CAPSULE    Take 1 capsule by mouth 3 times daily as needed for Cough    GUAIFENESIN (MUCINEX) 600 MG EXTENDED RELEASE TABLET    Take 1 tablet by mouth 2 times daily for 15 days    PREDNISONE (DELTASONE) 50 MG TABLET    Take 1 tablet by mouth daily for 5 days        There are no discontinued medications. Return if symptoms worsen or fail to improve.     Brian Hernandez MD

## 2020-02-14 ENCOUNTER — TELEPHONE (OUTPATIENT)
Dept: FAMILY MEDICINE CLINIC | Age: 80
End: 2020-02-14

## 2020-02-14 NOTE — TELEPHONE ENCOUNTER
He needs to be reevaluated by a provider.  Either schedule with me in the next 2-3 days or have him come to walk-in clinic

## 2020-04-09 ENCOUNTER — PREP FOR PROCEDURE (OUTPATIENT)
Dept: SURGERY | Age: 80
End: 2020-04-09

## 2020-04-09 ENCOUNTER — OFFICE VISIT (OUTPATIENT)
Dept: SURGERY | Age: 80
End: 2020-04-09
Payer: MEDICARE

## 2020-04-09 VITALS
DIASTOLIC BLOOD PRESSURE: 84 MMHG | TEMPERATURE: 97.6 F | WEIGHT: 188.8 LBS | BODY MASS INDEX: 29.63 KG/M2 | HEIGHT: 67 IN | SYSTOLIC BLOOD PRESSURE: 128 MMHG

## 2020-04-09 PROBLEM — K40.90 LEFT INGUINAL HERNIA: Status: ACTIVE | Noted: 2020-04-09

## 2020-04-09 PROCEDURE — 99203 OFFICE O/P NEW LOW 30 MIN: CPT | Performed by: SURGERY

## 2020-04-09 RX ORDER — MIRABEGRON 25 MG/1
TABLET, FILM COATED, EXTENDED RELEASE ORAL
COMMUNITY
Start: 2020-03-13 | End: 2020-05-21 | Stop reason: SDUPTHER

## 2020-04-09 ASSESSMENT — ENCOUNTER SYMPTOMS
EYES NEGATIVE: 1
ALLERGIC/IMMUNOLOGIC NEGATIVE: 1
BACK PAIN: 1
COLOR CHANGE: 0
BLOOD IN STOOL: 0
ABDOMINAL DISTENTION: 0
CHEST TIGHTNESS: 0
CONSTIPATION: 0
RHINORRHEA: 0
ABDOMINAL PAIN: 1
SHORTNESS OF BREATH: 1

## 2020-04-09 NOTE — PROGRESS NOTES
Subjective:      Patient ID: Willie Atwood is a 78 y.o. male. HPI   Willie Atwood is a 78 y.o. male seen at the request of Dr Jana Lawson for a possible Bilateral inguinal hernias. It was first noticed 1 month(s) ago. The patient complains of left groin discomfort and complains of a groin mass. Pain is achy and rates it as a 5 The patient complains of  problems(urinary incontinence}. The hernia is worse with standing. It does interfere with normal functions. The patient has had previous surgical treatment(right inguinal hernia repair). An ultrasound of the scrotum was done at the [facility] and it showed bilateral inguinal hernias with bowel in the left hernia. The patient is on anticoagulants(Eliquis}. He initially thought that he would wait until the pandemic was over before having it fixed but because his pain been getting worse he did not think he can wait several months so he is here now to see me. There is also question of whether he has a right inguinal hernia but is not symptomatic and will pursue this issue at a later date. Review of Systems   Constitutional: Negative for activity change, appetite change and unexpected weight change. HENT: Negative for congestion, nosebleeds, postnasal drip, rhinorrhea and sneezing. Eyes: Negative. Negative for visual disturbance. Respiratory: Positive for shortness of breath. Negative for chest tightness. Cardiovascular: Positive for chest pain. Negative for leg swelling. Gastrointestinal: Positive for abdominal pain. Negative for abdominal distention, blood in stool and constipation. Endocrine: Negative. Genitourinary: Negative for difficulty urinating. Musculoskeletal: Positive for back pain and gait problem. Negative for arthralgias and myalgias. Skin: Negative for color change. Allergic/Immunologic: Negative. Neurological: Negative for dizziness, light-headedness, numbness and headaches.    Hematological: Bruises/bleeds easily. Psychiatric/Behavioral: Negative for sleep disturbance.      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    CAD (coronary artery disease)     status post stents in 2006 and 2008    Cervical stenosis of spinal canal     status post laminectomies in cervical spine    CHF ([facility]estive heart failure) ([facility] 6/6/2017    Diverticulosis     colonoscopy 9/2016    Diverticulosis of colon 6/6/2017    Fracture of rib of right side     Hernia     umbilical hernia repair    History of TIA (transient ischemic attack) 11/16/2017    HTN (hypertension) 8/26/2014    Hyperlipidemia     Hypertension     Iron deficiency anemia     Melanoma ([address]     right arm 2004    Memory loss 3/4/2014    Mild persistent asthma without complication 8/8/1079    Osteoarthritis     Paroxysmal atrial fibrillation ([address] 11/16/2017    Renal insufficiency     Status post total knee replacement 1/4/2012    Thoracic spine pain     chronic    TIA (transient ischemic attack) 1/16/2017    Tremor     chronic    Ulcerative colitis ([address] 1/20/2017     Past Surgical History:   Procedure Laterality Date    [facility]    COLONOSCOPY  09/16/2016    polyp, sigmoid colitis, diverticulosis (DR ROSARIO)    SHOULDER ARTHROPLASTY Left     TOTAL KNEE ARTHROPLASTY Right     TOTAL KNEE ARTHROPLASTY Left     UMBILICAL HERNIA REPAIR      VENTRAL HERNIA REPAIR  01/07/2015     Social History     Tobacco Use    Smoking status: Never Smoker    Smokeless tobacco: Never Used   Substance Use Topics    Alcohol use: Yes     Comment: occasional    Drug use: No     Family History   Adopted: Yes     Codeine; Morphine; and Singulair [montelukast]  Allergies   Allergen Reactions    Codeine      nausea    Morphine     Singulair [Montelukast] Hives     Current Outpatient Medications   Medication Sig Dispense Refill    MYRBETRIQ 25 MG TB24 take 1 tablet by mouth once daily      Fexofenadine-Pseudoephedrine (ALLEGRA-D 24 HOUR PO) Take by mouth daily      DULERA 200-5 MCG/ACT inhaler   0    azelastine (ASTELIN) 0.1 % nasal spray 1 spray by Nasal route 2 times daily Use in each nostril as directed      potassium chloride (KLOR-CON M) 20 MEQ extended release tablet Take 1 tablet by mouth daily 90 tablet 1    atorvastatin (LIPITOR) 10 MG tablet Take 1 tablet by mouth daily 90 tablet 1    dicyclomine (BENTYL) 10 MG capsule Take 1 capsule by mouth 4 times daily 120 capsule 3    amLODIPine-benazepril (LOTREL) 5-20 MG per capsule Take 1 capsule by mouth 2 times daily 180 capsule 3    hydrALAZINE (APRESOLINE) 10 MG tablet Take 1 tablet by mouth 3 times daily 270 tablet 3    carbidopa-levodopa (SINEMET)  MG per tablet take 1 tablet by mouth every morning 4 days then twice a day 6 HOURS APART      Magnesium 400 MG TABS Take by mouth      albuterol sulfate HFA (PROAIR HFA) 108 (90 Base) MCG/ACT inhaler 2 puffs by Other route      propranolol (INDERAL LA) 120 MG extended release capsule Take 1 capsule by mouth daily 90 capsule 3    donepezil (ARICEPT) 10 MG tablet Take 10 mg by mouth 2 times daily  0    fluticasone (FLONASE) 50 MCG/ACT nasal spray 2 sprays by Nasal route daily 1 Bottle 5    ELIQUIS 5 MG TABS tablet       torsemide (DEMADEX) 20 MG tablet   1    primidone (MYSOLINE) 50 MG tablet Take 1 tablet by mouth 2 times daily (Patient taking differently: Take 50 mg by mouth 3 times daily ) 90 tablet 3    aspirin 81 MG EC tablet Take 81 mg by mouth daily Patient is taking once a week       No current facility-administered medications for this visit. /84   Temp 97.6 °F (36.4 °C) (Temporal)   Ht 5' 7\" (1.702 m)   Wt 188 lb 12.8 oz (85.6 kg)   BMI 29.57 kg/m² Left inguinal hernia    Objective:   Physical Exam  Constitutional:       General: He is not in acute distress. Appearance: Normal appearance. He is not ill-appearing.

## 2020-05-14 ENCOUNTER — NURSE TRIAGE (OUTPATIENT)
Dept: OTHER | Facility: CLINIC | Age: 80
End: 2020-05-14

## 2020-05-14 ENCOUNTER — TELEMEDICINE (OUTPATIENT)
Dept: FAMILY MEDICINE CLINIC | Age: 80
End: 2020-05-14
Payer: MEDICARE

## 2020-05-14 PROCEDURE — 87804 INFLUENZA ASSAY W/OPTIC: CPT | Performed by: FAMILY MEDICINE

## 2020-05-14 PROCEDURE — 99214 OFFICE O/P EST MOD 30 MIN: CPT | Performed by: FAMILY MEDICINE

## 2020-05-14 ASSESSMENT — ENCOUNTER SYMPTOMS
SHORTNESS OF BREATH: 0
FACIAL SWELLING: 0
DIARRHEA: 0
EYE REDNESS: 0
EYE PAIN: 0
RHINORRHEA: 0
ABDOMINAL DISTENTION: 0
CHOKING: 0
SORE THROAT: 0
CHEST TIGHTNESS: 0
ANAL BLEEDING: 0
SINUS PRESSURE: 0
COUGH: 0
TROUBLE SWALLOWING: 0
APNEA: 0
CONSTIPATION: 0
EYE DISCHARGE: 0
NAUSEA: 0
VOICE CHANGE: 0
PHOTOPHOBIA: 0
WHEEZING: 0
BACK PAIN: 0
BLOOD IN STOOL: 0
EYE ITCHING: 0
ABDOMINAL PAIN: 0

## 2020-05-14 NOTE — PROGRESS NOTES
carbidopa-levodopa (SINEMET)  MG per tablet take 1 tablet by mouth every morning 4 days then twice a day 6 HOURS APART  Historical Provider, MD   Magnesium 400 MG TABS Take by mouth  Historical Provider, MD   albuterol sulfate HFA (PROAIR HFA) 108 (90 Base) MCG/ACT inhaler 2 puffs by Other route  Historical Provider, MD   propranolol (INDERAL LA) 120 MG extended release capsule Take 1 capsule by mouth daily  [place], MD   donepezil (ARICEPT) 10 MG tablet Take 10 mg by mouth 2 times daily  Historical Provider, MD   fluticasone (FLONASE) 50 MCG/ACT nasal spray 2 sprays by Nasal route daily  Sangeeta Junior MD   ELIQUIS 5 MG TABS tablet   Historical Provider, MD   torsemide (DEMADEX) 20 MG tablet   Historical Provider, MD   primidone (MYSOLINE) 50 MG tablet Take 1 tablet by mouth 2 times daily  Patient taking differently: Take 50 mg by mouth 3 times daily   Mara Fernandez MD   aspirin 81 MG EC tablet Take 81 mg by mouth daily Patient is taking once a week  Historical Provider, MD       Social History     Tobacco Use    Smoking status: Never Smoker    Smokeless tobacco: Never Used   Substance Use Topics    Alcohol use: Yes     Comment: occasional    Drug use: No        Allergies   Allergen Reactions    Codeine      nausea    Morphine     Singulair [Montelukast] Hives   ,   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    CAD (coronary artery disease)     status post stents in 2006 and 2008    Cervical stenosis of spinal canal     status post laminectomies in cervical spine    CHF (congestive heart failure) ([facility] 6/6/2017    Diverticulosis     colonoscopy 9/2016    Diverticulosis of colon 6/6/2017    Fracture of rib of right side     Hernia     umbilical hernia repair    History of TIA (transient ischemic attack) 11/16/2017    HTN (hypertension) 8/26/2014    Hyperlipidemia     Hypertension     Iron deficiency anemia     [facility]     right arm 2004    Memory loss 3/4/2014    Mild persistent asthma without complication 3/0/3329    Osteoarthritis     Paroxysmal atrial fibrillation ([facility]) 11/16/2017    Renal insufficiency     Status post total knee replacement 1/4/2012    Thoracic spine pain     chronic    TIA (transient ischemic attack) 1/16/2017    Tremor     chronic    Ulcerative colitis ([facility] 1/20/2017   ,   Past Surgical History:   Procedure Laterality Date    COLONOSCOPY  2005     [facility]    COLONOSCOPY  09/16/2016    polyp, sigmoid colitis, diverticulosis (DR ROSARIO)    SHOULDER ARTHROPLASTY Left     TOTAL KNEE ARTHROPLASTY Right     TOTAL KNEE ARTHROPLASTY Left     UMBILICAL HERNIA REPAIR      VENTRAL HERNIA REPAIR  01/07/2015   ,   Social History     Tobacco Use    Smoking status: Never Smoker    Smokeless tobacco: Never Used   Substance Use Topics    Alcohol use: Yes     Comment: occasional    Drug use: No   ,   Family History   Adopted: Yes   ,   Immunization History   Administered Date(s) Administered    DTaP/Hib/IPV (Pentacel) 10/13/2016    Influenza Vaccine, unspecified formulation 09/28/2011, 10/16/2016    Influenza Virus Vaccine 10/01/2014    Influenza, High Dose (Fluzone 65 yrs and older) 09/28/2017    Influenza, Quadv, IM, (6 mo and older Fluzone, Flulaval, Fluarix and 3 yrs and older Afluria) 09/08/2016    Influenza, Quadv, IM, PF (6 mo and older Fluzone, Flulaval, Fluarix, and 3 yrs and older Afluria) 09/09/2015    Influenza, Triv, inactivated, subunit, adjuvanted, IM (Fluad 65 yrs and older) 10/09/2018, 09/17/2019    PPD Test 08/04/2016    Pneumococcal Conjugate 13-valent ([id number]) 10/16/2016    Pneumococcal Polysaccharide ([id number]) 11/30/2017    Tdap (Boostrix, Adacel) 03/09/2018    Zoster Live (Zostavax) 10/11/2013    Zoster Recombinant (Shingrix) 08/28/2018, 12/12/2018   ,   Health Maintenance   Topic Date Due    Annual Wellness Visit (AWV) 01/02/2021    Lipid screen  01/09/2021    Potassium monitoring  05/11/2021    Creatinine monitoring  05/11/2021    Colon cancer screen colonoscopy  09/16/2026    DTaP/Tdap/Td vaccine (3 - Td) 03/09/2028    Flu vaccine  Completed    Shingles Vaccine  Completed    Pneumococcal 65+ years Vaccine  Completed    Hepatitis A vaccine  Aged Out    Hepatitis B vaccine  Aged Out    Hib vaccine  Aged Out    Meningococcal (ACWY) vaccine  Aged Out       PHYSICAL EXAMINATION:    [x] Alert  [x] Oriented to person/place/time    [x] No apparent distress   [x] Sclera clear   [x] Breathing appears normal    [x] Cranial Nerves II-XII grossly intact    [x] Motor grossly intact in visible upper extremities    [x] Motor grossly intact in visible lower extremities    [x] Normal Mood       [] OTHER:      Due to this being a TeleHealth encounter, evaluation of the following organ systems is limited: Vitals/Constitutional/EENT/Resp/CV/GI//MS/Neuro/Skin/Heme-Lymph-Imm. ASSESSMENT/PLAN:  1. Fever, unspecified fever cause  UnClear etiology as it only occurs nocturnally,has been present for 1 month, and patient is otherwise asymptomatic  May be autonomic instability but will investigate for infectious causes  If any worsening,  patient should be evaluated in office physically or in the emergency room. - XR CHEST STANDARD (2 VW); Future  - URINALYSIS, MICRO; Future  - Urine Reflex to Culture; Future  - CBC With Auto Differential; Future  - POCT Influenza A/B  - COVID-19 Ambulatory; Future      Return if symptoms worsen or fail to improve.     An  electronic signature was used to authenticate this note.    --Caden Jefferson MD on 5/14/2020 at 12:06 PM        Pursuant to the emergency declaration under the [facility] [place], [facility] waiver authority and the [facility] and [employer] Act, this Virtual  Visit was conducted, with patient's consent, to reduce the patient's

## 2020-05-15 ENCOUNTER — HOSPITAL ENCOUNTER (OUTPATIENT)
Dept: LAB | Age: 80
Discharge: HOME | End: 2020-05-15
Payer: MEDICARE

## 2020-05-15 ENCOUNTER — HOSPITAL ENCOUNTER (OUTPATIENT)
Dept: GENERAL RADIOLOGY | Age: 80
Discharge: HOME | End: 2020-05-17
Payer: MEDICARE

## 2020-05-15 ENCOUNTER — TELEPHONE (OUTPATIENT)
Dept: FAMILY MEDICINE CLINIC | Age: 80
End: 2020-05-15

## 2020-05-15 ENCOUNTER — HOSPITAL ENCOUNTER (OUTPATIENT)
Age: 80
Discharge: HOME | End: 2020-05-17
Payer: MEDICARE

## 2020-05-15 LAB
BASOPHILS ABSOLUTE: 0 K/UL (ref 0–0.2)
BASOPHILS RELATIVE PERCENT: 0.6 %
BILIRUBIN URINE: NEGATIVE
BLOOD, URINE: NEGATIVE
CLARITY: CLEAR
COLOR: YELLOW
EOSINOPHILS ABSOLUTE: 0.3 K/UL (ref 0–0.7)
EOSINOPHILS RELATIVE PERCENT: 3.7 %
GLUCOSE URINE: NEGATIVE MG/DL
HCT VFR BLD CALC: 44.1 % (ref 42–52)
HEMOGLOBIN: 14.2 G/DL (ref 14–18)
KETONES, URINE: NEGATIVE MG/DL
LEUKOCYTE ESTERASE, URINE: NEGATIVE
LYMPHOCYTES ABSOLUTE: 0.6 K/UL (ref 1–4.8)
LYMPHOCYTES RELATIVE PERCENT: 8.9 %
MCH RBC QN AUTO: 31.8 PG (ref 27–31.3)
MCHC RBC AUTO-ENTMCNC: 32.3 % (ref 33–37)
MCV RBC AUTO: 98.6 FL (ref 80–100)
MONOCYTES ABSOLUTE: 0.6 K/UL (ref 0.2–0.8)
MONOCYTES RELATIVE PERCENT: 8.3 %
NEUTROPHILS ABSOLUTE: 5.5 K/UL (ref 1.4–6.5)
NEUTROPHILS RELATIVE PERCENT: 78.5 %
NITRITE, URINE: NEGATIVE
PDW BLD-RTO: 14.3 % (ref 11.5–14.5)
PH UA: 7 (ref 5–9)
PLATELET # BLD: 171 K/UL (ref 130–400)
PROTEIN UA: NEGATIVE MG/DL
RBC # BLD: 4.47 M/UL (ref 4.7–6.1)
SPECIFIC GRAVITY UA: 1.01 (ref 1–1.03)
URINE REFLEX TO CULTURE: NORMAL
UROBILINOGEN, URINE: 0.2 E.U./DL
WBC # BLD: 7 K/UL (ref 4.8–10.8)

## 2020-05-15 PROCEDURE — 36415 COLL VENOUS BLD VENIPUNCTURE: CPT

## 2020-05-15 PROCEDURE — 71046 X-RAY EXAM CHEST 2 VIEWS: CPT

## 2020-05-15 PROCEDURE — 85025 COMPLETE CBC W/AUTO DIFF WBC: CPT

## 2020-05-15 PROCEDURE — 81003 URINALYSIS AUTO W/O SCOPE: CPT

## 2020-05-18 ENCOUNTER — TELEPHONE (OUTPATIENT)
Dept: FAMILY MEDICINE CLINIC | Age: 80
End: 2020-05-18

## 2020-05-21 ENCOUNTER — VIRTUAL VISIT (OUTPATIENT)
Dept: FAMILY MEDICINE CLINIC | Age: 80
End: 2020-05-21
Payer: MEDICARE

## 2020-05-21 PROBLEM — N32.3 DIVERTICULUM OF BLADDER: Status: ACTIVE | Noted: 2020-05-21

## 2020-05-21 PROBLEM — N39.41 URGE INCONTINENCE OF URINE: Status: ACTIVE | Noted: 2020-01-22

## 2020-05-21 PROCEDURE — G8510 SCR DEP NEG, NO PLAN REQD: HCPCS | Performed by: FAMILY MEDICINE

## 2020-05-21 PROCEDURE — 99213 OFFICE O/P EST LOW 20 MIN: CPT | Performed by: FAMILY MEDICINE

## 2020-05-21 PROCEDURE — 3288F FALL RISK ASSESSMENT DOCD: CPT | Performed by: FAMILY MEDICINE

## 2020-05-21 RX ORDER — HYDRALAZINE HYDROCHLORIDE 10 MG/1
10 TABLET, FILM COATED ORAL 3 TIMES DAILY
Qty: 270 TABLET | Refills: 1 | Status: SHIPPED | OUTPATIENT
Start: 2020-05-21 | End: 2021-01-12

## 2020-05-21 RX ORDER — DICYCLOMINE HYDROCHLORIDE 10 MG/1
10 CAPSULE ORAL 4 TIMES DAILY
Qty: 120 CAPSULE | Refills: 3 | Status: SHIPPED | OUTPATIENT
Start: 2020-05-21 | End: 2020-11-10

## 2020-05-21 RX ORDER — DONEPEZIL HYDROCHLORIDE 10 MG/1
10 TABLET, FILM COATED ORAL 2 TIMES DAILY
Qty: 180 TABLET | Refills: 1 | Status: SHIPPED | OUTPATIENT
Start: 2020-05-21 | End: 2021-01-26

## 2020-05-21 RX ORDER — ATORVASTATIN CALCIUM 10 MG/1
10 TABLET, FILM COATED ORAL DAILY
Qty: 90 TABLET | Refills: 1 | Status: SHIPPED | OUTPATIENT
Start: 2020-05-21 | End: 2021-01-12 | Stop reason: SDUPTHER

## 2020-05-21 RX ORDER — ZAFIRLUKAST 20 MG/1
20 TABLET, FILM COATED ORAL DAILY
COMMUNITY
Start: 2020-04-23 | End: 2021-01-12

## 2020-05-21 ASSESSMENT — ENCOUNTER SYMPTOMS
DIARRHEA: 0
WHEEZING: 0
NAUSEA: 0
SHORTNESS OF BREATH: 0
CONSTIPATION: 0
CHEST TIGHTNESS: 0
BLOOD IN STOOL: 0
ANAL BLEEDING: 0
COUGH: 0
VOMITING: 0
ABDOMINAL PAIN: 0

## 2020-05-21 ASSESSMENT — PATIENT HEALTH QUESTIONNAIRE - PHQ9
1. LITTLE INTEREST OR PLEASURE IN DOING THINGS: 0
SUM OF ALL RESPONSES TO PHQ9 QUESTIONS 1 & 2: 1
SUM OF ALL RESPONSES TO PHQ QUESTIONS 1-9: 1
SUM OF ALL RESPONSES TO PHQ QUESTIONS 1-9: 1
2. FEELING DOWN, DEPRESSED OR HOPELESS: 1

## 2020-05-21 NOTE — PROGRESS NOTES
2020    TELEHEALTH EVALUATION -- Audio/Visual (During UKGBK-45 public health emergency)    HPI:    Tania Sanchez (:  1940) has requested an audio/video evaluation for the following concern(s):    Patient reports feeling well overall since his most recent video visit. He denies feeling ill, no reported malaise, myalgias, fatigue, chills, or sweats. He denies any problems with appetite, no reported N/V, abdominal pain, diarrhea, or constipation. Patient does have a large left-sided inguinal hernia for which he is waiting for cardiology to provide clearance to proceed with repair. The discomfort from this hernia is only intermittent and noted mainly with more walking or straining. He denies any further issues with elevated temperatures since his most recent visit. Review of Systems   Constitutional: Negative for appetite change, chills, diaphoresis, fatigue, fever and unexpected weight change. Eyes: Negative for visual disturbance. Respiratory: Negative for cough, chest tightness, shortness of breath and wheezing. Cardiovascular: Negative for chest pain, palpitations and leg swelling. No orthopnea, No PND   Gastrointestinal: Negative for abdominal pain, anal bleeding, blood in stool, constipation, diarrhea, nausea and vomiting. No heartburn, No melena   Endocrine: Negative for cold intolerance, heat intolerance, polydipsia, polyphagia and polyuria. Genitourinary: Negative for dysuria and hematuria. Musculoskeletal: Negative for myalgias. Skin: Negative for rash. Neurological: Positive for tremors (chronic). Negative for dizziness, seizures, syncope, weakness, light-headedness, numbness and headaches. Psychiatric/Behavioral: Negative for dysphoric mood. The patient is not nervous/anxious. Prior to Visit Medications    Medication Sig Taking?  Authorizing Provider   donepezil (ARICEPT) 10 MG tablet Take 1 tablet by mouth 2 times daily Yes Anaid Verduzco MD

## 2020-06-03 ENCOUNTER — OFFICE VISIT (OUTPATIENT)
Dept: SURGERY | Age: 80
End: 2020-06-03
Payer: MEDICARE

## 2020-06-03 VITALS
HEIGHT: 67 IN | SYSTOLIC BLOOD PRESSURE: 126 MMHG | DIASTOLIC BLOOD PRESSURE: 82 MMHG | BODY MASS INDEX: 29.35 KG/M2 | WEIGHT: 187 LBS | TEMPERATURE: 98.3 F

## 2020-06-03 PROCEDURE — 99213 OFFICE O/P EST LOW 20 MIN: CPT | Performed by: SURGERY

## 2020-06-03 ASSESSMENT — ENCOUNTER SYMPTOMS
BACK PAIN: 1
CHEST TIGHTNESS: 0
EYES NEGATIVE: 1
RHINORRHEA: 0
COLOR CHANGE: 0
CONSTIPATION: 0
ABDOMINAL PAIN: 1
ABDOMINAL DISTENTION: 0
SHORTNESS OF BREATH: 1
ALLERGIC/IMMUNOLOGIC NEGATIVE: 1
BLOOD IN STOOL: 0

## 2020-06-03 NOTE — PROGRESS NOTES
Subjective:      Patient ID: Machelle Stokes is a 78 y.o. male. Machelle Stokes is a 78 y.o. male seen in follow-up for bilateral inguinal hernias. It was first noticed 3 month(s) ago. The patient complains of left groin discomfort and complains of a groin mass. Pain is achy and rates it as a 5 The patient complains of  problems(urinary incontinence}. The hernia is worse with standing. It does interfere with normal functions. The patient has had previous surgical treatment(right inguinal hernia repair). An ultrasound of the scrotum was done at the [facility] and it showed bilateral inguinal hernias with bowel in the left hernia. The patient is on anticoagulants(Eliquis}. He was seen in the past and he decided to wait on his hernia repair. he and his wife have now decided to have the repair done and wished to have both sides repaired. He had preop clearance by Dr. Dane Montaño of cardiology service. Review of Systems   Constitutional: Negative for activity change, appetite change and unexpected weight change. HENT: Negative for congestion, nosebleeds, postnasal drip, rhinorrhea and sneezing. Eyes: Negative. Negative for visual disturbance. Respiratory: Positive for shortness of breath. Negative for chest tightness. Cardiovascular: Positive for chest pain. Negative for leg swelling. Gastrointestinal: Positive for abdominal pain. Negative for abdominal distention, blood in stool and constipation. Endocrine: Negative. Genitourinary: Negative for difficulty urinating. Musculoskeletal: Positive for back pain and gait problem. Negative for arthralgias and myalgias. Skin: Negative for color change. Allergic/Immunologic: Negative. Neurological: Negative for dizziness, light-headedness, numbness and headaches. Hematological: Bruises/bleeds easily. Psychiatric/Behavioral: Negative for sleep disturbance.      Past Medical History:   Diagnosis Date    Benign head tremor

## 2020-06-04 PROBLEM — K40.91 RECURRENT RIGHT INGUINAL HERNIA: Status: ACTIVE | Noted: 2020-06-04

## 2020-06-15 ENCOUNTER — HOSPITAL ENCOUNTER (OUTPATIENT)
Dept: LAB | Age: 80
Discharge: HOME | End: 2020-06-15
Payer: MEDICARE

## 2020-06-15 LAB
ANION GAP SERPL CALCULATED.3IONS-SCNC: 12 MEQ/L (ref 9–15)
BUN BLDV-MCNC: 25 MG/DL (ref 8–23)
CALCIUM SERPL-MCNC: 8.8 MG/DL (ref 8.5–9.9)
CHLORIDE BLD-SCNC: 101 MEQ/L (ref 95–107)
CO2: 29 MEQ/L (ref 20–31)
CREAT SERPL-MCNC: 1.36 MG/DL (ref 0.7–1.2)
GFR AFRICAN AMERICAN: >60
GFR NON-AFRICAN AMERICAN: 50.1
GLUCOSE BLD-MCNC: 94 MG/DL (ref 70–99)
POTASSIUM SERPL-SCNC: 4.5 MEQ/L (ref 3.4–4.9)
SODIUM BLD-SCNC: 142 MEQ/L (ref 135–144)

## 2020-06-15 PROCEDURE — 36415 COLL VENOUS BLD VENIPUNCTURE: CPT

## 2020-06-15 PROCEDURE — 80048 BASIC METABOLIC PNL TOTAL CA: CPT

## 2020-06-25 ENCOUNTER — NURSE ONLY (OUTPATIENT)
Dept: PRIMARY CARE CLINIC | Age: 80
End: 2020-06-25

## 2020-06-25 ENCOUNTER — HOSPITAL ENCOUNTER (OUTPATIENT)
Dept: PREADMISSION TESTING | Age: 80
Discharge: HOME | End: 2020-06-29
Payer: MEDICARE

## 2020-06-25 VITALS
DIASTOLIC BLOOD PRESSURE: 75 MMHG | BODY MASS INDEX: 31.31 KG/M2 | RESPIRATION RATE: 16 BRPM | HEART RATE: 50 BPM | HEIGHT: 64 IN | OXYGEN SATURATION: 98 % | WEIGHT: 183.4 LBS | TEMPERATURE: 98 F | SYSTOLIC BLOOD PRESSURE: 155 MMHG

## 2020-06-25 LAB
ANION GAP SERPL CALCULATED.3IONS-SCNC: 10 MEQ/L (ref 9–15)
BUN BLDV-MCNC: 24 MG/DL (ref 8–23)
CALCIUM SERPL-MCNC: 9.3 MG/DL (ref 8.5–9.9)
CHLORIDE BLD-SCNC: 106 MEQ/L (ref 95–107)
CO2: 29 MEQ/L (ref 20–31)
CREAT SERPL-MCNC: 1.2 MG/DL (ref 0.7–1.2)
GFR AFRICAN AMERICAN: >60
GFR NON-AFRICAN AMERICAN: 57.9
GLUCOSE BLD-MCNC: 87 MG/DL (ref 70–99)
HCT VFR BLD CALC: 41.4 % (ref 42–52)
HEMOGLOBIN: 13.7 G/DL (ref 14–18)
MCH RBC QN AUTO: 32.3 PG (ref 27–31.3)
MCHC RBC AUTO-ENTMCNC: 33.1 % (ref 33–37)
MCV RBC AUTO: 97.6 FL (ref 80–100)
PDW BLD-RTO: 14 % (ref 11.5–14.5)
PLATELET # BLD: 165 K/UL (ref 130–400)
POTASSIUM SERPL-SCNC: 4.1 MEQ/L (ref 3.4–4.9)
RBC # BLD: 4.24 M/UL (ref 4.7–6.1)
SODIUM BLD-SCNC: 145 MEQ/L (ref 135–144)
WBC # BLD: 7.3 K/UL (ref 4.8–10.8)

## 2020-06-25 PROCEDURE — 85027 COMPLETE CBC AUTOMATED: CPT

## 2020-06-25 PROCEDURE — 80048 BASIC METABOLIC PNL TOTAL CA: CPT

## 2020-06-25 PROCEDURE — U0003 INFECTIOUS AGENT DETECTION BY NUCLEIC ACID (DNA OR RNA); SEVERE ACUTE RESPIRATORY SYNDROME CORONAVIRUS 2 (SARS-COV-2) (CORONAVIRUS DISEASE [COVID-19]), AMPLIFIED PROBE TECHNIQUE, MAKING USE OF HIGH THROUGHPUT TECHNOLOGIES AS DESCRIBED BY CMS-2020-01-R: HCPCS

## 2020-06-25 RX ORDER — LIDOCAINE HYDROCHLORIDE 10 MG/ML
1 INJECTION, SOLUTION EPIDURAL; INFILTRATION; INTRACAUDAL; PERINEURAL
Status: CANCELLED | OUTPATIENT
Start: 2020-06-25 | End: 2020-06-25

## 2020-06-25 RX ORDER — SODIUM CHLORIDE, SODIUM LACTATE, POTASSIUM CHLORIDE, CALCIUM CHLORIDE 600; 310; 30; 20 MG/100ML; MG/100ML; MG/100ML; MG/100ML
INJECTION, SOLUTION INTRAVENOUS CONTINUOUS
Status: CANCELLED | OUTPATIENT
Start: 2020-06-25

## 2020-06-25 RX ORDER — SODIUM CHLORIDE 0.9 % (FLUSH) 0.9 %
10 SYRINGE (ML) INJECTION EVERY 12 HOURS SCHEDULED
Status: CANCELLED | OUTPATIENT
Start: 2020-06-25

## 2020-06-25 RX ORDER — CEFAZOLIN SODIUM 2 G/50ML
2 SOLUTION INTRAVENOUS ONCE
Status: CANCELLED | OUTPATIENT
Start: 2020-07-01

## 2020-06-25 RX ORDER — KETOROLAC TROMETHAMINE 30 MG/ML
30 INJECTION, SOLUTION INTRAMUSCULAR; INTRAVENOUS ONCE
Status: CANCELLED | OUTPATIENT
Start: 2020-07-01 | End: 2020-07-05

## 2020-06-25 RX ORDER — SODIUM CHLORIDE 0.9 % (FLUSH) 0.9 %
10 SYRINGE (ML) INJECTION PRN
Status: CANCELLED | OUTPATIENT
Start: 2020-06-25

## 2020-06-25 SDOH — HEALTH STABILITY: MENTAL HEALTH: HOW OFTEN DO YOU HAVE A DRINK CONTAINING ALCOHOL?: NEVER

## 2020-06-25 NOTE — PROGRESS NOTES
Cardiac notes and ekg dated 5/11/2020 paper copy in chart. Pt to have covid test 6/25/2020, pt agrees to self quarantine.

## 2020-06-30 LAB
SARS-COV-2: NOT DETECTED
SOURCE: NORMAL

## 2020-07-01 ENCOUNTER — HOSPITAL ENCOUNTER (OUTPATIENT)
Age: 80
Setting detail: OUTPATIENT SURGERY
Discharge: HOME | End: 2020-07-01
Attending: SURGERY | Admitting: SURGERY
Payer: MEDICARE

## 2020-07-01 ENCOUNTER — ANESTHESIA (OUTPATIENT)
Dept: OPERATING ROOM | Age: 80
End: 2020-07-01
Payer: MEDICARE

## 2020-07-01 ENCOUNTER — ANESTHESIA EVENT (OUTPATIENT)
Dept: OPERATING ROOM | Age: 80
End: 2020-07-01
Payer: MEDICARE

## 2020-07-01 VITALS
BODY MASS INDEX: 27.97 KG/M2 | DIASTOLIC BLOOD PRESSURE: 81 MMHG | SYSTOLIC BLOOD PRESSURE: 147 MMHG | HEIGHT: 66 IN | WEIGHT: 174 LBS | HEART RATE: 55 BPM | OXYGEN SATURATION: 93 % | RESPIRATION RATE: 14 BRPM | TEMPERATURE: 97.6 F

## 2020-07-01 VITALS — TEMPERATURE: 95.2 F | OXYGEN SATURATION: 99 % | DIASTOLIC BLOOD PRESSURE: 72 MMHG | SYSTOLIC BLOOD PRESSURE: 134 MMHG

## 2020-07-01 PROCEDURE — 6360000002 HC RX W HCPCS: Performed by: SURGERY

## 2020-07-01 PROCEDURE — 2580000003 HC RX 258: Performed by: NURSE PRACTITIONER

## 2020-07-01 PROCEDURE — 3600000013 HC SURGERY LEVEL 3 ADDTL 15MIN: Performed by: SURGERY

## 2020-07-01 PROCEDURE — 3700000001 HC ADD 15 MINUTES (ANESTHESIA): Performed by: SURGERY

## 2020-07-01 PROCEDURE — 6370000000 HC RX 637 (ALT 250 FOR IP): Performed by: SURGERY

## 2020-07-01 PROCEDURE — 49520 REREPAIR ING HERNIA REDUCE: CPT | Performed by: SURGERY

## 2020-07-01 PROCEDURE — 2580000003 HC RX 258: Performed by: SURGERY

## 2020-07-01 PROCEDURE — 64488 TAP BLOCK BI INJECTION: CPT | Performed by: ANESTHESIOLOGY

## 2020-07-01 PROCEDURE — 7100000000 HC PACU RECOVERY - FIRST 15 MIN: Performed by: SURGERY

## 2020-07-01 PROCEDURE — 3600000003 HC SURGERY LEVEL 3 BASE: Performed by: SURGERY

## 2020-07-01 PROCEDURE — 6360000002 HC RX W HCPCS: Performed by: ANESTHESIOLOGY

## 2020-07-01 PROCEDURE — 3700000000 HC ANESTHESIA ATTENDED CARE: Performed by: SURGERY

## 2020-07-01 PROCEDURE — 49505 PRP I/HERN INIT REDUC >5 YR: CPT | Performed by: SURGERY

## 2020-07-01 PROCEDURE — 2709999900 HC NON-CHARGEABLE SUPPLY: Performed by: SURGERY

## 2020-07-01 PROCEDURE — C1781 MESH (IMPLANTABLE): HCPCS | Performed by: SURGERY

## 2020-07-01 PROCEDURE — 7100000011 HC PHASE II RECOVERY - ADDTL 15 MIN: Performed by: SURGERY

## 2020-07-01 PROCEDURE — 7100000001 HC PACU RECOVERY - ADDTL 15 MIN: Performed by: SURGERY

## 2020-07-01 PROCEDURE — 2580000003 HC RX 258: Performed by: ANESTHESIOLOGY

## 2020-07-01 PROCEDURE — 2500000003 HC RX 250 WO HCPCS: Performed by: ANESTHESIOLOGY

## 2020-07-01 PROCEDURE — 7100000010 HC PHASE II RECOVERY - FIRST 15 MIN: Performed by: SURGERY

## 2020-07-01 DEVICE — MESH HERN L W1.6XL2IN INGUINAL WHT POLYPR MFIL PLUG PTCH: Type: IMPLANTABLE DEVICE | Site: GROIN | Status: FUNCTIONAL

## 2020-07-01 DEVICE — IMPLANTABLE DEVICE: Type: IMPLANTABLE DEVICE | Site: GROIN | Status: FUNCTIONAL

## 2020-07-01 RX ORDER — GLYCOPYRROLATE 0.2 MG/ML
INJECTION INTRAMUSCULAR; INTRAVENOUS PRN
Status: DISCONTINUED | OUTPATIENT
Start: 2020-07-01 | End: 2020-07-01 | Stop reason: SDUPTHER

## 2020-07-01 RX ORDER — ONDANSETRON 4 MG/1
4 TABLET, ORALLY DISINTEGRATING ORAL EVERY 8 HOURS PRN
Qty: 30 TABLET | Refills: 1 | Status: SHIPPED | OUTPATIENT
Start: 2020-07-01 | End: 2020-08-03 | Stop reason: ALTCHOICE

## 2020-07-01 RX ORDER — SODIUM CHLORIDE 0.9 % (FLUSH) 0.9 %
10 SYRINGE (ML) INJECTION PRN
Status: DISCONTINUED | OUTPATIENT
Start: 2020-07-01 | End: 2020-07-01 | Stop reason: HOSPADM

## 2020-07-01 RX ORDER — SODIUM CHLORIDE 0.9 % (FLUSH) 0.9 %
10 SYRINGE (ML) INJECTION EVERY 12 HOURS SCHEDULED
Status: DISCONTINUED | OUTPATIENT
Start: 2020-07-01 | End: 2020-07-01 | Stop reason: HOSPADM

## 2020-07-01 RX ORDER — SODIUM CHLORIDE, SODIUM LACTATE, POTASSIUM CHLORIDE, CALCIUM CHLORIDE 600; 310; 30; 20 MG/100ML; MG/100ML; MG/100ML; MG/100ML
INJECTION, SOLUTION INTRAVENOUS CONTINUOUS PRN
Status: DISCONTINUED | OUTPATIENT
Start: 2020-07-01 | End: 2020-07-01 | Stop reason: SDUPTHER

## 2020-07-01 RX ORDER — MAGNESIUM HYDROXIDE 1200 MG/15ML
LIQUID ORAL CONTINUOUS PRN
Status: COMPLETED | OUTPATIENT
Start: 2020-07-01 | End: 2020-07-01

## 2020-07-01 RX ORDER — SODIUM CHLORIDE, SODIUM LACTATE, POTASSIUM CHLORIDE, CALCIUM CHLORIDE 600; 310; 30; 20 MG/100ML; MG/100ML; MG/100ML; MG/100ML
INJECTION, SOLUTION INTRAVENOUS CONTINUOUS
Status: DISCONTINUED | OUTPATIENT
Start: 2020-07-01 | End: 2020-07-01 | Stop reason: HOSPADM

## 2020-07-01 RX ORDER — ROCURONIUM BROMIDE 10 MG/ML
INJECTION, SOLUTION INTRAVENOUS PRN
Status: DISCONTINUED | OUTPATIENT
Start: 2020-07-01 | End: 2020-07-01 | Stop reason: SDUPTHER

## 2020-07-01 RX ORDER — HYDROCODONE BITARTRATE AND ACETAMINOPHEN 5; 325 MG/1; MG/1
1 TABLET ORAL EVERY 4 HOURS PRN
Qty: 18 TABLET | Refills: 0 | Status: SHIPPED | OUTPATIENT
Start: 2020-07-01 | End: 2020-07-04

## 2020-07-01 RX ORDER — PROPOFOL 10 MG/ML
INJECTION, EMULSION INTRAVENOUS PRN
Status: DISCONTINUED | OUTPATIENT
Start: 2020-07-01 | End: 2020-07-01 | Stop reason: SDUPTHER

## 2020-07-01 RX ORDER — ONDANSETRON 2 MG/ML
4 INJECTION INTRAMUSCULAR; INTRAVENOUS EVERY 6 HOURS PRN
Status: DISCONTINUED | OUTPATIENT
Start: 2020-07-01 | End: 2020-07-01 | Stop reason: HOSPADM

## 2020-07-01 RX ORDER — KETOROLAC TROMETHAMINE 30 MG/ML
30 INJECTION, SOLUTION INTRAMUSCULAR; INTRAVENOUS ONCE
Status: COMPLETED | OUTPATIENT
Start: 2020-07-01 | End: 2020-07-01

## 2020-07-01 RX ORDER — HYDROMORPHONE HCL 110MG/55ML
0.5 PATIENT CONTROLLED ANALGESIA SYRINGE INTRAVENOUS EVERY 5 MIN PRN
Status: DISCONTINUED | OUTPATIENT
Start: 2020-07-01 | End: 2020-07-01 | Stop reason: HOSPADM

## 2020-07-01 RX ORDER — ROPIVACAINE HYDROCHLORIDE 5 MG/ML
INJECTION, SOLUTION EPIDURAL; INFILTRATION; PERINEURAL
Status: COMPLETED | OUTPATIENT
Start: 2020-07-01 | End: 2020-07-01

## 2020-07-01 RX ORDER — PROMETHAZINE HYDROCHLORIDE 12.5 MG/1
12.5 TABLET ORAL EVERY 6 HOURS PRN
Status: DISCONTINUED | OUTPATIENT
Start: 2020-07-01 | End: 2020-07-01 | Stop reason: HOSPADM

## 2020-07-01 RX ORDER — LIDOCAINE HYDROCHLORIDE 10 MG/ML
1 INJECTION, SOLUTION EPIDURAL; INFILTRATION; INTRACAUDAL; PERINEURAL
Status: DISCONTINUED | OUTPATIENT
Start: 2020-07-01 | End: 2020-07-01 | Stop reason: HOSPADM

## 2020-07-01 RX ORDER — LIDOCAINE HYDROCHLORIDE 20 MG/ML
INJECTION, SOLUTION INFILTRATION; PERINEURAL PRN
Status: DISCONTINUED | OUTPATIENT
Start: 2020-07-01 | End: 2020-07-01 | Stop reason: SDUPTHER

## 2020-07-01 RX ORDER — FENTANYL CITRATE 50 UG/ML
INJECTION, SOLUTION INTRAMUSCULAR; INTRAVENOUS PRN
Status: DISCONTINUED | OUTPATIENT
Start: 2020-07-01 | End: 2020-07-01 | Stop reason: SDUPTHER

## 2020-07-01 RX ORDER — CEFAZOLIN SODIUM 2 G/50ML
2 SOLUTION INTRAVENOUS ONCE
Status: COMPLETED | OUTPATIENT
Start: 2020-07-01 | End: 2020-07-01

## 2020-07-01 RX ORDER — ONDANSETRON 2 MG/ML
4 INJECTION INTRAMUSCULAR; INTRAVENOUS
Status: COMPLETED | OUTPATIENT
Start: 2020-07-01 | End: 2020-07-01

## 2020-07-01 RX ORDER — TRAMADOL HYDROCHLORIDE 50 MG/1
50 TABLET ORAL EVERY 6 HOURS PRN
Status: DISCONTINUED | OUTPATIENT
Start: 2020-07-01 | End: 2020-07-01 | Stop reason: HOSPADM

## 2020-07-01 RX ORDER — MORPHINE SULFATE 1 MG/ML
1 INJECTION, SOLUTION EPIDURAL; INTRATHECAL; INTRAVENOUS
Status: DISCONTINUED | OUTPATIENT
Start: 2020-07-01 | End: 2020-07-01 | Stop reason: HOSPADM

## 2020-07-01 RX ADMIN — KETOROLAC TROMETHAMINE 30 MG: 30 INJECTION, SOLUTION INTRAMUSCULAR at 07:18

## 2020-07-01 RX ADMIN — FENTANYL CITRATE 50 MCG: 50 INJECTION INTRAMUSCULAR; INTRAVENOUS at 08:12

## 2020-07-01 RX ADMIN — SODIUM CHLORIDE, POTASSIUM CHLORIDE, SODIUM LACTATE AND CALCIUM CHLORIDE: 600; 310; 30; 20 INJECTION, SOLUTION INTRAVENOUS at 10:28

## 2020-07-01 RX ADMIN — FENTANYL CITRATE 50 MCG: 50 INJECTION INTRAMUSCULAR; INTRAVENOUS at 07:38

## 2020-07-01 RX ADMIN — GLYCOPYRROLATE 0.2 MG: 0.2 INJECTION INTRAMUSCULAR; INTRAVENOUS at 08:36

## 2020-07-01 RX ADMIN — ROCURONIUM BROMIDE 50 MG: 10 INJECTION INTRAVENOUS at 07:38

## 2020-07-01 RX ADMIN — GLYCOPYRROLATE 0.2 MG: 0.2 INJECTION INTRAMUSCULAR; INTRAVENOUS at 08:33

## 2020-07-01 RX ADMIN — HYDROMORPHONE HYDROCHLORIDE 0.5 MG: 2 INJECTION, SOLUTION INTRAMUSCULAR; INTRAVENOUS; SUBCUTANEOUS at 10:08

## 2020-07-01 RX ADMIN — ROPIVACAINE HYDROCHLORIDE 40 ML: 5 INJECTION, SOLUTION EPIDURAL; INFILTRATION; PERINEURAL at 07:50

## 2020-07-01 RX ADMIN — FENTANYL CITRATE 50 MCG: 50 INJECTION INTRAMUSCULAR; INTRAVENOUS at 09:15

## 2020-07-01 RX ADMIN — TRAMADOL HYDROCHLORIDE 50 MG: 50 TABLET, FILM COATED ORAL at 10:11

## 2020-07-01 RX ADMIN — FENTANYL CITRATE 50 MCG: 50 INJECTION INTRAMUSCULAR; INTRAVENOUS at 08:09

## 2020-07-01 RX ADMIN — MORPHINE SULFATE 1 MG: 1 INJECTION EPIDURAL; INTRATHECAL; INTRAVENOUS at 09:58

## 2020-07-01 RX ADMIN — ONDANSETRON 4 MG: 2 INJECTION INTRAMUSCULAR; INTRAVENOUS at 08:58

## 2020-07-01 RX ADMIN — SODIUM CHLORIDE, POTASSIUM CHLORIDE, SODIUM LACTATE AND CALCIUM CHLORIDE: 600; 310; 30; 20 INJECTION, SOLUTION INTRAVENOUS at 07:13

## 2020-07-01 RX ADMIN — PROPOFOL 100 MG: 10 INJECTION, EMULSION INTRAVENOUS at 07:38

## 2020-07-01 RX ADMIN — CEFAZOLIN SODIUM 2 G: 2 SOLUTION INTRAVENOUS at 07:45

## 2020-07-01 RX ADMIN — LIDOCAINE HYDROCHLORIDE 60 MG: 20 INJECTION, SOLUTION INFILTRATION; PERINEURAL at 07:38

## 2020-07-01 RX ADMIN — SODIUM CHLORIDE, POTASSIUM CHLORIDE, SODIUM LACTATE AND CALCIUM CHLORIDE: 600; 310; 30; 20 INJECTION, SOLUTION INTRAVENOUS at 07:30

## 2020-07-01 RX ADMIN — SUGAMMADEX 200 MG: 100 INJECTION, SOLUTION INTRAVENOUS at 09:22

## 2020-07-01 ASSESSMENT — PULMONARY FUNCTION TESTS
PIF_VALUE: 17
PIF_VALUE: 2
PIF_VALUE: 3
PIF_VALUE: 18
PIF_VALUE: 16
PIF_VALUE: 17
PIF_VALUE: 18
PIF_VALUE: 8
PIF_VALUE: 16
PIF_VALUE: 16
PIF_VALUE: 15
PIF_VALUE: 16
PIF_VALUE: 17
PIF_VALUE: 16
PIF_VALUE: 16
PIF_VALUE: 30
PIF_VALUE: 16
PIF_VALUE: 17
PIF_VALUE: 15
PIF_VALUE: 16
PIF_VALUE: 18
PIF_VALUE: 17
PIF_VALUE: 16
PIF_VALUE: 17
PIF_VALUE: 2
PIF_VALUE: 16
PIF_VALUE: 17
PIF_VALUE: 27
PIF_VALUE: 16
PIF_VALUE: 17
PIF_VALUE: 16
PIF_VALUE: 0
PIF_VALUE: 18
PIF_VALUE: 15
PIF_VALUE: 16
PIF_VALUE: 16
PIF_VALUE: 17
PIF_VALUE: 16
PIF_VALUE: 17
PIF_VALUE: 17
PIF_VALUE: 16
PIF_VALUE: 2
PIF_VALUE: 16
PIF_VALUE: 17
PIF_VALUE: 16
PIF_VALUE: 0
PIF_VALUE: 16
PIF_VALUE: 0
PIF_VALUE: 15
PIF_VALUE: 28
PIF_VALUE: 16
PIF_VALUE: 17
PIF_VALUE: 16
PIF_VALUE: 17
PIF_VALUE: 16
PIF_VALUE: 18
PIF_VALUE: 16
PIF_VALUE: 16
PIF_VALUE: 19
PIF_VALUE: 16
PIF_VALUE: 2
PIF_VALUE: 17
PIF_VALUE: 16
PIF_VALUE: 15
PIF_VALUE: 16
PIF_VALUE: 1
PIF_VALUE: 16
PIF_VALUE: 18
PIF_VALUE: 16
PIF_VALUE: 18
PIF_VALUE: 17
PIF_VALUE: 10
PIF_VALUE: 15
PIF_VALUE: 17
PIF_VALUE: 16
PIF_VALUE: 16
PIF_VALUE: 15
PIF_VALUE: 15
PIF_VALUE: 16
PIF_VALUE: 16
PIF_VALUE: 18
PIF_VALUE: 19
PIF_VALUE: 17
PIF_VALUE: 17
PIF_VALUE: 16

## 2020-07-01 ASSESSMENT — PAIN SCALES - GENERAL
PAINLEVEL_OUTOF10: 0
PAINLEVEL_OUTOF10: 7
PAINLEVEL_OUTOF10: 7
PAINLEVEL_OUTOF10: 0
PAINLEVEL_OUTOF10: 5
PAINLEVEL_OUTOF10: 3
PAINLEVEL_OUTOF10: 5
PAINLEVEL_OUTOF10: 7

## 2020-07-01 ASSESSMENT — PAIN DESCRIPTION - FREQUENCY
FREQUENCY: CONTINUOUS

## 2020-07-01 ASSESSMENT — PAIN DESCRIPTION - ORIENTATION
ORIENTATION: LEFT

## 2020-07-01 ASSESSMENT — PAIN DESCRIPTION - PROGRESSION
CLINICAL_PROGRESSION: GRADUALLY IMPROVING

## 2020-07-01 ASSESSMENT — PAIN DESCRIPTION - PAIN TYPE
TYPE: SURGICAL PAIN

## 2020-07-01 ASSESSMENT — PAIN DESCRIPTION - LOCATION
LOCATION: ABDOMEN

## 2020-07-01 ASSESSMENT — PAIN - FUNCTIONAL ASSESSMENT: PAIN_FUNCTIONAL_ASSESSMENT: 0-10

## 2020-07-01 ASSESSMENT — PAIN DESCRIPTION - DESCRIPTORS
DESCRIPTORS: ACHING

## 2020-07-01 ASSESSMENT — PAIN DESCRIPTION - ONSET
ONSET: AWAKENED FROM SLEEP

## 2020-07-01 NOTE — OP NOTE
oblique. External oblique was  opened up through the external ring, where there was noted to be a huge  hernia. It appeared to be a large indirect inguinal hernia. The hernia  sac was dissected away from the cord structures and reduced back into  the floor of the inguinal canal, where an extra large PerFix plug was  put into place and sutured circumferentially with 2-0 Vicryl. Afterwards, an onlay patch was placed in the floor of the inguinal canal  and sutured medially and laterally with 2-0 Vicryl. The cord and its  structures were placed back in the inguinal canal and the external  oblique was closed using 2-0 Vicryl, 3-0 Vicryl for the subcutaneous  tissue, and the skin was closed with 4-0 Monocryl with skin glue on the  skin edges. He tolerated the left side well. I then went to the right  side, where a similar incision was made, carried down through the  subcutaneous tissue to the external oblique. External oblique was  opened up through the external ring and the spermatic cord and  structures were identified. The patient was noted to have a  medium-sized indirect inguinal hernia. The hernia sac was dissected  away from the cord structures and reduced back into the floor of the  inguinal canal and the internal ring. Large PerFix plug was put into  place and sutured circumferentially with 2-0 Vicryl. An onlay patch was  cut to fit the floor of the inguinal canal and sutured medially and  laterally with 2-0 Vicryl. The cord and its structures were intact and  placed back in inguinal canal.  There was excellent hemostasis. Closure  was then done using 2-0 Vicryl for the external oblique, 3-0 Vicryl for  the subcutaneous tissue, and the skin was closed with 4-0 Monocryl with  skin glue on the skin edges. He tolerated both procedures well, went to  recovery in stable condition.   I attempted to call the patient's wife  after surgery, but she did not answer the phone, and I left a message  for her to

## 2020-07-01 NOTE — ANESTHESIA PRE PROCEDURE
Department of Anesthesiology  Preprocedure Note       Name:  Tristan Garcia   Age:  78 y.o.  :  1940                                          MRN:  [id number]         Date:  2020      Surgeon: Saige Jimenez):  Mirza Ortiz MD    Procedure: Procedure(s):  BILATERAL INGUINAL  HERNIA REPAIR, 90 MIN    Medications prior to admission:   Prior to Admission medications    Medication Sig Start Date End Date Taking?  Authorizing Provider   donepezil (ARICEPT) 10 MG tablet Take 1 tablet by mouth 2 times daily 20  Yes Dipika Soria MD   hydrALAZINE (APRESOLINE) 10 MG tablet Take 1 tablet by mouth 3 times daily 20  Yes Dipika Soria MD   dicyclomine (BENTYL) 10 MG capsule Take 1 capsule by mouth 4 times daily 20  Yes Dipika Soria MD   zafirlukast (ACCOLATE) 20 MG tablet Take 20 mg by mouth daily 20  Yes Historical Provider, MD   azelastine (ASTELIN) 0.1 % nasal spray 1 spray by Nasal route 2 times daily Use in each nostril as directed   Yes Historical Provider, MD   mirabegron (MYRBETRIQ) 25 MG TB24 Take 25 mg by mouth 19 Yes Historical Provider, MD   potassium chloride (KLOR-CON M) 20 MEQ extended release tablet Take 1 tablet by mouth daily 20  Yes Dipika Soria MD   amLODIPine-benazepril (LOTREL) 5-20 MG per capsule Take 1 capsule by mouth 2 times daily 19  Yes Dipika Soria MD   Magnesium 400 MG TABS Take by mouth   Yes Historical Provider, MD   albuterol sulfate HFA (PROAIR HFA) 108 (90 Base) MCG/ACT inhaler 2 puffs by Other route 16  Yes Historical Provider, MD   propranolol (INDERAL LA) 120 MG extended release capsule Take 1 capsule by mouth daily 3/7/19  Yes Dipika Soria MD   torsemide (DEMADEX) 20 MG tablet Take 40 mg by mouth daily  4/3/17  Yes Historical Provider, MD   primidone (MYSOLINE) 50 MG tablet Take 1 tablet by mouth 2 times daily  Patient taking differently: Take 50 mg by mouth 3 times daily  17  Yes Anil Napoles MD atorvastatin (LIPITOR) 10 MG tablet Take 1 tablet by mouth daily 5/21/20   MD Yashira Cooperthe 52 200-5 MCG/ACT inhaler  12/11/19   Historical Provider, MD   ELIQUDASH 5 MG TABS tablet  1/31/18   Historical Provider, MD   aspirin 81 MG EC tablet Take 81 mg by mouth once a week     Historical Provider, MD       Current medications:    Current Facility-Administered Medications   Medication Dose Route Frequency Provider Last Rate Last Dose    lactated ringers infusion   Intravenous Continuous Robert Darter, APRN - CNP        lidocaine PF 1 % injection 1 mL  1 mL Intradermal Once PRN Robert Darter, APRN - CNP        sodium chloride flush 0.9 % injection 10 mL  10 mL Intravenous 2 times per day Robert Darter, APRN - CNP        sodium chloride flush 0.9 % injection 10 mL  10 mL Intravenous PRN Robert Darter, APRN - CNP           Allergies:     Allergies   Allergen Reactions    Codeine      nausea    Morphine Other (See Comments)     Confused and disoriented    Singulair [Montelukast] Hives       Problem List:    Patient Active Problem List   Diagnosis Code    CAD (coronary artery disease) I25.10    Hyperlipidemia E78.5    Osteoarthritis M19.90    Cervical stenosis of spinal canal M48.02    Overactive bladder N32.81    Memory loss R41.3    HTN (hypertension) I10    Benign head tremor G25.0    CHF (congestive heart failure) (HCC) I50.9    Ulcerative colitis ([facility] [address] K51.90    BPH with obstruction/lower urinary tract symptoms N40.1, N13.8    Diverticulosis of colon K57.30    Paroxysmal atrial fibrillation (HCC) I48.0    History of TIA (transient ischemic attack) Z86.73    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    Mild persistent asthma without complication N48.26    Hypertension I10    Personal history of colonic polyps Z86.010    Left inguinal hernia K40.90    Urge incontinence of urine N39.41    Diverticulum of bladder N32.3    Recurrent right inguinal hernia K40.91       Past Medical History:        Diagnosis Date    Benign head tremor 12/1/2014    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    CAD (coronary artery disease)     status post stents in 2006 and 2008    Cervical stenosis of spinal canal     status post laminectomies in cervical spine    CHF (congestive heart failure) ([address] 6/6/2017    Diverticulosis     colonoscopy 9/2016    Diverticulosis of colon 6/6/2017    Fracture of rib of right side     Hernia     umbilical hernia repair    History of TIA (transient ischemic attack) 11/16/2017    HTN (hypertension) 8/26/2014    Hyperlipidemia     Hypertension     Iron deficiency anemia     Melanoma ([address]     right arm 2004    Memory loss 3/4/2014    Mild persistent asthma without complication 7/0/1070    Osteoarthritis     Paroxysmal atrial fibrillation ([address] 11/16/2017    Renal insufficiency     Status post total knee replacement 1/4/2012    Thoracic spine pain     chronic    TIA (transient ischemic attack) 1/16/2017    Tremor     chronic    Ulcerative colitis ([address] 1/20/2017       Past Surgical History:        Procedure Laterality Date    [facility]  2005    [facility]    COLONOSCOPY  09/16/2016    polyp, sigmoid colitis, diverticulosis (DR ROSAR[facility])    SHOULDER ARTHROPLASTY Left     TOTAL KNEE ARTHROPLASTY Right     TOTAL KNEE ARTHROPLASTY Left     UMBILICAL HERNIA REPAIR      VENTRAL HERNIA REPAIR  01/07/2015       Social History:    Social History     Tobacco Use    Smoking status: Never Smoker    Smokeless tobacco: Never Used   Substance Use Topics    Alcohol use: Never     Frequency: Never                                Counseling given: Not Answered      Vital Signs (Current):   Vitals:    07/01/20 0627   BP: (!) 163/85   Pulse: 53   Resp: 16   Temp: 98.1 °F (36.7 °C)   TempSrc: Temporal   SpO2: 97%   Weight: 174 lb (78.9 kg)   Height: 5' 6\" (1.676 m)                                              BP Cardiovascular:    (+) hypertension:, CAD:, CABG/stent:, dysrhythmias: atrial fibrillation, CHF:, pulmonary hypertension:,                   Neuro/Psych:   (+) TIA,             GI/Hepatic/Renal:   (+) PUD,           Endo/Other:                     Abdominal:           Vascular:                                        Anesthesia Plan      general and regional     ASA 3       Induction: intravenous. MIPS: Postoperative opioids intended. Anesthetic plan and risks discussed with patient. Plan discussed with attending.     Attending anesthesiologist reviewed and agrees with Pre Eval content              João Alberto MD   7/1/2020

## 2020-07-01 NOTE — INTERVAL H&P NOTE
Update History & Physical        History and Physical exam reviewed, the patient interviewed and re examined. There have been no interval changes.   Electronically signed by Thomas Henry MD on 7/1/2020 at 7:23 AM

## 2020-07-01 NOTE — ANESTHESIA POSTPROCEDURE EVALUATION
Department of Anesthesiology  Postprocedure Note    Patient: Amber Taveras  MRN: [id number]  YOB: 1940  Date of evaluation: 7/1/2020  Time:  9:35 AM     Procedure Summary     Date:  07/01/20 Room / Location:  [address]    Anesthesia Start:  0730 Anesthesia Stop:  0480    Procedure:  BILATERAL INGUINAL HERNIA REPAIR (Bilateral ) Diagnosis:  (BILATERAL INGUINAL HERNIA)    Surgeon:  Duong Sanders MD Responsible Provider:  Christine Rogers MD    Anesthesia Type:  general, regional ASA Status:  3          Anesthesia Type: general, regional    Adelaida Phase I: Adelaida Score: 10    Adelaida Phase II:      Last vitals: Reviewed and per EMR flowsheets.        Anesthesia Post Evaluation    Patient location during evaluation: bedside  Patient participation: complete - patient participated  Level of consciousness: awake and awake and alert  Airway patency: patent  Nausea & Vomiting: no nausea and no vomiting  Complications: no  Cardiovascular status: blood pressure returned to baseline and hemodynamically stable  Respiratory status: acceptable  Hydration status: euvolemic

## 2020-07-01 NOTE — H&P (VIEW-ONLY)
Leland Iglesias is a 78 y.o. male seen in follow-up for bilateral inguinal hernias. It was first noticed 3 month(s) ago. The patient complains of left groin discomfort and complains of a groin mass. Pain is achy and rates it as a 5 The patient complains of  problems(urinary incontinence}. The hernia is worse with standing. It does interfere with normal functions. The patient has had previous surgical treatment(right inguinal hernia repair). An ultrasound of the scrotum was done at the [facility] and it showed bilateral inguinal hernias with bowel in the left hernia. The patient is on anticoagulants(Eliquis}. He was seen in the past and he decided to wait on his hernia repair. he and his wife have now decided to have the repair done and wished to have both sides repaired. He had preop clearance by Dr. Vinita Casas of cardiology service.     Review of Systems   Constitutional: Negative for activity change, appetite change and unexpected weight change. HENT: Negative for congestion, nosebleeds, postnasal drip, rhinorrhea and sneezing. Eyes: Negative. Negative for visual disturbance. Respiratory: Positive for shortness of breath. Negative for chest tightness. Cardiovascular: Positive for chest pain. Negative for leg swelling. Gastrointestinal: Positive for abdominal pain. Negative for abdominal distention, blood in stool and constipation. Endocrine: Negative. Genitourinary: Negative for difficulty urinating. Musculoskeletal: Positive for back pain and gait problem. Negative for arthralgias and myalgias. Skin: Negative for color change. Allergic/Immunologic: Negative. Neurological: Negative for dizziness, light-headedness, numbness and headaches. Hematological: Bruises/bleeds easily.    Psychiatric/Behavioral: Negative for sleep disturbance.      Past Medical History        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    CAD (coronary artery disease)       status post stents in 2006 and 2008    Cervical stenosis of spinal canal       status post laminectomies in cervical spine    CHF (congestive heart failure) ([address] 6/6/2017    Diverticulosis       colonoscopy 9/2016    Diverticulosis of colon 6/6/2017    Fracture of rib of right side      Hernia       umbilical hernia repair    History of TIA (transient ischemic attack) 11/16/2017    HTN (hypertension) 8/26/2014    Hyperlipidemia      Hypertension      Iron deficiency anemia      Melanoma ([address]       right arm 2004    Memory loss 3/4/2014    Mild persistent asthma without complication 6/8/8251    Osteoarthritis      Paroxysmal atrial fibrillation ([address] 11/16/2017    Renal insufficiency      Status post total knee replacement 1/4/2012    Thoracic spine pain       chronic    TIA (transient ischemic attack) 1/16/2017    Tremor       chronic    Ulcerative colitis ([address] 1/20/2017         Past Surgical History         Past Surgical History:   Procedure Laterality Date    COL[facility]OSCOPY   2005      [facility]    COLONOSCOPY   09/16/2016     polyp, sigmoid colitis, diverticulosis ([facility])    SHOULDER ARTHROPLASTY Left      TOTAL KNEE ARTHROPLASTY Right      TOTAL KNEE ARTHROPLASTY Left      UMBILICAL HERNIA REPAIR        VENTRAL HERNIA REPAIR   01/07/2015         Social History            Tobacco Use    Smoking status: Never Smoker    Smokeless tobacco: Never Used   Substance Use Topics    Alcohol use: Yes       Comment: occasional    Drug use: No      Family History   Family History   Adopted:  Yes         Codeine; Morphine; and Singulair [montelukast]        Allergies   Allergen Reactions    Codeine         nausea    Morphine      Singulair [Montelukast] Hives      Current Facility-Administered Medications          Current Outpatient Medications   Medication Sig Dispense Refill    donepezil (ARICEPT) 10 MG tablet Take 1 tablet by mouth 2 times daily 180 tablet 1    hydrALAZINE (APRESOLINE) 10 MG tablet Take 1 tablet by mouth 3 times daily 270 tablet 1    dicyclomine (BENTYL) 10 MG capsule Take 1 capsule by mouth 4 times daily 120 capsule 3    atorvastatin (LIPITOR) 10 MG tablet Take 1 tablet by mouth daily 90 tablet 1    zafirlukast (ACCOLATE) 20 MG tablet Take 20 mg by mouth daily        DULERA 200-5 MCG/ACT inhaler     0    azelastine (ASTELIN) 0.1 % nasal spray 1 spray by Nasal route 2 times daily Use in each nostril as directed        mirabegron (MYRBETRIQ) 25 MG TB24 Take 25 mg by mouth        potassium chloride (KLOR-CON M) 20 MEQ extended release tablet Take 1 tablet by mouth daily 90 tablet 1    amLODIPine-benazepril (LOTREL) 5-20 MG per capsule Take 1 capsule by mouth 2 times daily 180 capsule 3    carbidopa-levodopa (SINEMET)  MG per tablet take 1 tablet by mouth every morning 4 days then twice a day 6 HOURS APART        Magnesium 400 MG TABS Take by mouth        albuterol sulfate HFA (PROAIR HFA) 108 (90 Base) MCG/ACT inhaler 2 puffs by Other route        propranolol (INDERAL LA) 120 MG extended release capsule Take 1 capsule by mouth daily 90 capsule 3    fluticasone (FLONASE) 50 MCG/ACT nasal spray 2 sprays by Nasal route daily 1 Bottle 5    ELIQUIS 5 MG TABS tablet          torsemide (DEMADEX) 20 MG tablet Take 40 mg by mouth daily    1    primidone (MYSOLINE) 50 MG tablet Take 1 tablet by mouth 2 times daily (Patient taking differently: Take 50 mg by mouth 3 times daily ) 90 tablet 3    aspirin 81 MG EC tablet Take 81 mg by mouth once a week           No current facility-administered medications for this visit.          /82   Temp 98.3 °F (36.8 °C) (Temporal)   Ht 5' 7\" (1.702 m)   Wt 187 lb (84.8 kg)   BMI 29.29 kg/m² Left inguinal hernia     Objective:   Physical Exam  Constitutional:       General: He is not in acute distress. Appearance: Normal appearance.  He is not ill-appearing. HENT:      Mouth/Throat:      Mouth: Mucous membranes are moist.      Pharynx: Oropharynx is clear. Eyes:      Pupils: Pupils are equal, round, and reactive to light. Neck:      Comments: Neck is supple without any masses, no thyromegaly, trachea midline  Cardiovascular:      Rate and Rhythm: Normal rate and regular rhythm. Heart sounds: No murmur. Pulmonary:      Effort: Pulmonary effort is normal. No respiratory distress. Breath sounds: Normal breath sounds. Abdominal:      Palpations: There is no hepatomegaly or splenomegaly. Tenderness: There is no abdominal tenderness. Hernia: A hernia is present. Hernia is present in the left inguinal area (large tender non reducible LIH). There is no hernia in the right inguinal area. Musculoskeletal:      Comments: Slow slightly unsteady gait   Skin:     Findings: No bruising, lesion or rash. Neurological:      Mental Status: He is alert and oriented to person, place, and time. Psychiatric:         Mood and Affect: Mood normal.         Judgment: Judgment normal.            Assessment:   Left inguinal hernia, symptomatic, painful, progressively worsening  Right inguinal hernia, recurrent                   Plan:   Recurrent right inguinal hernia repair  Left inguinal hernia repair  The risks, benefits and indications for repair of the inguinal hernia are reviewed with the patient. The potential risks and complications including but not exclusive to bleeding, infection, nerve damage, testicular damage, chronic pain and recurrence were reviewed. Anticipated convalescence is discussed. The probable use of prosthetic materials/ mesh is reviewed. All questions are answered and the patient. I discussed with him and his wife about the pandemic situation and that we are only doing selective cases during this time. We will need to stop his Eliquis for 3 days.    The patient was counseled at length about the risks of mariah Covid-19 in the perioperative period and any recovery window from their procedure.  The patient was made aware that mariah Covid-19  may worsen their prognosis for recovering from their procedure  and lend to a higher morbidity and/or mortality risk.  The patient was given the options of postponing their procedure. All material risks, benefits, and alternatives were discussed.  The patient does wish to proceed with the procedure at this time.                   Estefani Gutierres MD

## 2020-07-01 NOTE — ANESTHESIA PROCEDURE NOTES
Peripheral Block    Patient location during procedure: pre-op  Start time: 7/1/2020 7:45 AM  End time: 7/1/2020 7:55 AM  Staffing  Anesthesiologist: João Alberto MD  Performed: anesthesiologist   Preanesthetic Checklist  Completed: patient identified, site marked, surgical consent, pre-op evaluation, timeout performed, IV checked, risks and benefits discussed, monitors and equipment checked, anesthesia consent given, oxygen available and patient being monitored  Peripheral Block  Patient position: supine  Prep: ChloraPrep  Patient monitoring: cardiac monitor, continuous pulse ox, frequent blood pressure checks and IV access  Block type: TAP  Laterality: bilateral  Injection technique: single-shot  Procedures: ultrasound guided and nerve stimulator  Local infiltration: ropivacaine  Infiltration strength: 0.5 %  Dose: 40 mL  Provider prep: mask and sterile gloves (Sterile probe cover)  Local infiltration: ropivacaine  Needle  Needle type: combined needle/nerve stimulator   Needle gauge: 22 G  Needle length: 5 cm  Needle localization: anatomical landmarks and ultrasound guidance  Assessment  Injection assessment: negative aspiration for heme, no paresthesia on injection and local visualized surrounding nerve on ultrasound  Paresthesia pain: immediately resolved  Slow fractionated injection: yes  Hemodynamics: stable  Additional Notes  Ultrasound image printed and saved in patient chart.     Sterile probe cover used    Medications Administered  Ropivacaine (NAROPIN) injection 0.5%, 40 mL  Reason for block: post-op pain management and at surgeon's request

## 2020-07-12 NOTE — TELEPHONE ENCOUNTER
Pharmacy is requesting medication refill.  Please approve or deny this request.    Rx requested:  Requested Prescriptions     Pending Prescriptions Disp Refills    potassium chloride (KLOR-CON M) 20 MEQ extended release tablet [Pharmacy Med Name: POTASSIUM CL ER 20 MEQ TABLET] 90 tablet 1     Sig: Take 1 tablet by mouth daily         Last Office Visit:   2/3/2020      Next Visit Date:  Future Appointments   Date Time Provider Iqra Vinesi   7/15/2020  2:30 PM Samantha Guillaume  [facility]   8/4/2020  2:20 PM MD Manfred Johnson 94

## 2020-07-13 RX ORDER — POTASSIUM CHLORIDE 20 MEQ/1
20 TABLET, EXTENDED RELEASE ORAL DAILY
Qty: 90 TABLET | Refills: 1 | Status: SHIPPED | OUTPATIENT
Start: 2020-07-13 | End: 2021-01-12 | Stop reason: SDUPTHER

## 2020-07-15 ENCOUNTER — OFFICE VISIT (OUTPATIENT)
Dept: SURGERY | Age: 80
End: 2020-07-15

## 2020-07-15 VITALS
DIASTOLIC BLOOD PRESSURE: 70 MMHG | TEMPERATURE: 97.4 F | WEIGHT: 184 LBS | HEIGHT: 67 IN | SYSTOLIC BLOOD PRESSURE: 110 MMHG | BODY MASS INDEX: 28.88 KG/M2

## 2020-07-15 PROCEDURE — 99024 POSTOP FOLLOW-UP VISIT: CPT | Performed by: SURGERY

## 2020-07-16 NOTE — PROGRESS NOTES
Subjective:      Patient ID: Tristan Garcia is a 78 y.o. male. HPI   Tristan Garcia is status post repair of a large left inguinal hernia and a recurrent right inguinal hernia on 7/1/2020 . The patient is back on his Eliquis. The patient is convalescing very well. Pain control is excellent using Norco. Appetite is excellent. GI function is normal.   function is normal.  He has not returned to normal activities. He is complaining of some mild testicular swelling. Left greater than right. He is here with his wife. Review of Systems    Objective:   Physical Exam  Constitutional:       General: He is not in acute distress. Appearance: Normal appearance. HENT:      Mouth/Throat:      Mouth: Mucous membranes are moist.      Pharynx: Oropharynx is clear. Eyes:      Pupils: Pupils are equal, round, and reactive to light. Neck:      Comments: Neck is supple without any masses, no thyromegaly, trachea midline  Abdominal:      Palpations: There is no hepatomegaly or splenomegaly. Tenderness: There is no abdominal tenderness. Comments: Bilateral groin incisions are well approximated, erythema is not present, swelling is mild, no evidence of hernia, tenderness is mild, drainage is not present, skin glue/sutures are present   Genitourinary:     Scrotum/Testes:         Right: Tenderness or swelling not present. Left: Swelling present. Tenderness not present. Musculoskeletal:      Comments: Normal gait   Skin:     Findings: No bruising, lesion or rash. Neurological:      Mental Status: He is alert and oriented to person, place, and time.    Psychiatric:         Mood and Affect: Mood normal.         Judgment: Judgment normal.       /70   Temp 97.4 °F (36.3 °C) (Temporal)   Ht 5' 7\" (1.702 m)   Wt 184 lb (83.5 kg)   BMI 28.82 kg/m²   Assessment:      Doing well      Plan:      Return visit to see me in 1 month        Mirza Ortiz MD

## 2020-08-03 ENCOUNTER — TELEMEDICINE (OUTPATIENT)
Dept: FAMILY MEDICINE CLINIC | Age: 80
End: 2020-08-03
Payer: MEDICARE

## 2020-08-03 ENCOUNTER — TELEPHONE (OUTPATIENT)
Dept: FAMILY MEDICINE CLINIC | Age: 80
End: 2020-08-03

## 2020-08-03 PROBLEM — M25.519 SHOULDER PAIN: Status: ACTIVE | Noted: 2020-08-03

## 2020-08-03 PROCEDURE — 99214 OFFICE O/P EST MOD 30 MIN: CPT | Performed by: FAMILY MEDICINE

## 2020-08-03 ASSESSMENT — ENCOUNTER SYMPTOMS
NAUSEA: 0
COUGH: 0
ABDOMINAL PAIN: 0
VOMITING: 0
ANAL BLEEDING: 0
SHORTNESS OF BREATH: 0
CONSTIPATION: 0
DIARRHEA: 0
BLOOD IN STOOL: 0
CHEST TIGHTNESS: 0
WHEEZING: 0

## 2020-08-03 ASSESSMENT — PATIENT HEALTH QUESTIONNAIRE - PHQ9
2. FEELING DOWN, DEPRESSED OR HOPELESS: 0
1. LITTLE INTEREST OR PLEASURE IN DOING THINGS: 0
SUM OF ALL RESPONSES TO PHQ QUESTIONS 1-9: 0
SUM OF ALL RESPONSES TO PHQ9 QUESTIONS 1 & 2: 0
SUM OF ALL RESPONSES TO PHQ QUESTIONS 1-9: 0

## 2020-08-03 NOTE — PROGRESS NOTES
8/3/2020    TELEHEALTH EVALUATION -- Audio/Visual (During [facility] public health emergency)    HPI:    Janie Rosales (:  1940) has requested an audio/video evaluation for the following concern(s):    Patient presents accompanied by wife for virtual video visit for chronic atrial fibrillation, hypertension, CHF, hyperlipidemia, CAD, asthma, ulcerative colitis, BPH, and urge incontinence management. Patient reports taking medications as prescribed since the most recent visit. They deny adhering to any specific lower carbohydrate or lower salt diet. They deny any routine exercise outside of normal day-to-day activity. His only complaint from a cardiopulmonary standpoint is reported mild fatigue with noted lower heart rate over the past several weeks, usually in the low 50s and high 40's. Patient denies any chest pain, dyspnea, palpitations, lightheadedness, dizziness, worsening lower extremity edema, or syncope. Patient denies any dyspnea at rest, persistent wheezing, worsening cough, chest tightness, or limitation in normal day-to-day activity due to breathing. He reports having a upcoming visit with cardiology scheduled next week and prefers not to make any medication changes until he reviews things with the cardiologist.    He is s/p bilateral inguinal hernia repair on 2020 with no complaints. He denies any current pain or swelling in the areas of question. He denies any problems with appetite, N/V, abdominal pain, diarrhea or constipation. He reports being established with urology for urge incontinence and reports only 1-2 episodes per week, usually later at night. Review of Systems   Constitutional: Negative for appetite change, chills, diaphoresis, fatigue, fever and unexpected weight change. Eyes: Negative for visual disturbance. Respiratory: Negative for cough, chest tightness, shortness of breath and wheezing.     Cardiovascular: Negative for chest pain, palpitations and leg swelling. No orthopnea, No PND   Gastrointestinal: Negative for abdominal pain, anal bleeding, blood in stool, constipation, diarrhea, nausea and vomiting. No heartburn, No melena   Endocrine: Negative for cold intolerance, heat intolerance, polydipsia, polyphagia and polyuria. Genitourinary: Negative for dysuria and hematuria. +nocturia, No sensation of incomplete bladder emptying   Musculoskeletal: Negative for myalgias. Skin: Negative for rash. Neurological: Negative for dizziness, syncope, weakness, light-headedness, numbness and headaches. Psychiatric/Behavioral: Negative for dysphoric mood. The patient is not nervous/anxious. Prior to Visit Medications    Medication Sig Taking?  Authorizing Provider   potassium chloride (KLOR-CON M) 20 MEQ extended release tablet Take 1 tablet by mouth daily Yes [place], MD   donepezil (ARICEPT) 10 MG tablet Take 1 tablet by mouth 2 times daily Yes [place], MD   hydrALAZINE (APRESOLINE) 10 MG tablet Take 1 tablet by mouth 3 times daily Yes [place], MD   dicyclomine (BENTYL) 10 MG capsule Take 1 capsule by mouth 4 times daily Yes Sangeeta Junior MD   atorvastatin (LIPITOR) 10 MG tablet Take 1 tablet by mouth daily Yes [place], MD   zafirlukast (ACCOLATE) 20 MG tablet Take 20 mg by mouth daily Yes Historical Provider, MD Geogre Hernandez 200-5 MCG/ACT inhaler  Yes Historical Provider, MD   azelastine (ASTELIN) 0.1 % nasal spray 1 spray by Nasal route 2 times daily Use in each nostril as directed Yes Historical Provider, MD   amLODIPine-benazepril (LOTREL) 5-20 MG per capsule Take 1 capsule by mouth 2 times daily Yes [place], MD   Magnesium 400 MG TABS Take by mouth Yes Historical Provider, MD   albuterol sulfate HFA (PROAIR HFA) 108 (90 Base) MCG/ACT inhaler 2 puffs by Other route Yes Historical Provider, MD   propranolol (INDERAL LA) 120 MG extended release capsule Take 1 capsule by mouth daily Yes Niya Cm Salas MD   ELIQUIS 5 MG TABS tablet  Yes Historical Provider, MD   torsemide (DEMADEX) 20 MG tablet Take 20 mg by mouth daily bid Yes Historical Provider, MD   primidone (MYSOLINE) 50 MG tablet Take 1 tablet by mouth 2 times daily  Patient taking differently: Take 50 mg by mouth 3 times daily  Yes Lizzy Mendoza MD   aspirin 81 MG EC tablet Take 81 mg by mouth once a week  Yes Historical Provider, MD   mirabegron (MYRBETRIQ) 25 MG TB24 Take 25 mg by mouth  Historical Provider, MD       Social History     Tobacco Use    Smoking status: Never Smoker    Smokeless tobacco: Never Used   Substance Use Topics    Alcohol use: Never     Frequency: Never    Drug use: No            PHYSICAL EXAMINATION:  [ INSTRUCTIONS:  \"[x]\" Indicates a positive item  \"[]\" Indicates a negative item  -- DELETE ALL ITEMS NOT EXAMINED]  Vital Signs: (As obtained by patient/caregiver or practitioner observation)    Blood pressure-  Heart rate-    Respiratory rate-    Temperature-  Pulse oximetry-     Constitutional: [x] Appears well-developed and well-nourished [x] No apparent distress      [] Abnormal-   Mental status  [x] Alert and awake  [] Oriented to person/place/time [x]Able to follow commands      Eyes:  EOM    [x]  Normal  [] Abnormal-  Sclera  [x]  Normal  [] Abnormal -         Discharge [x]  None visible  [] Abnormal -    HENT:   [x] Normocephalic, atraumatic.   [] Abnormal   [x] Mouth/Throat: Mucous membranes are moist.     External Ears [x] Normal  [] Abnormal-     Neck: [x] No visualized mass     Pulmonary/Chest: [x] Respiratory effort normal.  [x] No visualized signs of difficulty breathing or respiratory distress        [] Abnormal-      Musculoskeletal:   [] Normal gait with no signs of ataxia         [] Normal range of motion of neck        [] Abnormal-       Neurological:        [x] No Facial Asymmetry (Cranial nerve 7 motor function) (limited exam to video visit)          [] No gaze palsy        [] Abnormal- Skin:        [x] No significant exanthematous lesions or discoloration noted on facial skin         [] Abnormal-            Psychiatric:       [x] Normal Affect [] No Hallucinations        [] Abnormal-     Other pertinent observable physical exam findings-     ASSESSMENT/PLAN:  1. Bradycardia  Patient with bradycardia reported over the past several weeks. I reviewed with him the potential need to decrease the dose of his propranolol and the likely association of his bradycardia to his mild fatigue. Patient declines any medication changes today, he states he wishes to discuss this further with his cardiologist.  I advised him that I would reach out to his cardiologist office to have them discussed with him even before his next visit potentially adjusting his dose of propranolol. I will have office staff reach out to cardiology office to inform them of his problems with bradycardia and ask if they could contact the patient to make recommendations even prior to patient's upcoming office visit. 2. Paroxysmal atrial fibrillation (HCC)  Stable. Currently asymptomatic and remains on anticoagulation without signs of bleeding. Patient is established with cardiology for long-term follow-up    - CBC Auto Differential; Future  - Comprehensive Metabolic Panel; Future  - TSH without Reflex; Future    3. Essential hypertension  Historically well controlled. We will continue to follow over time    - CBC Auto Differential; Future  - Comprehensive Metabolic Panel; Future    4. Chronic congestive heart failure, unspecified heart failure type ([address]  Stable. No reported worsening lower extremity edema or worsening respiratory complaints. We will follow over time    - CBC Auto Differential; Future  - Comprehensive Metabolic Panel; Future    5. Hyperlipidemia, unspecified hyperlipidemia type    - Comprehensive Metabolic Panel; Future  - Lipid Panel; Future    6.  Coronary artery disease involving native coronary artery of

## 2020-08-07 ENCOUNTER — TELEPHONE (OUTPATIENT)
Dept: FAMILY MEDICINE CLINIC | Age: 80
End: 2020-08-07

## 2020-08-07 RX ORDER — PROPRANOLOL HYDROCHLORIDE 80 MG/1
80 CAPSULE, EXTENDED RELEASE ORAL DAILY
Qty: 90 CAPSULE | Refills: 1 | Status: SHIPPED | OUTPATIENT
Start: 2020-08-07 | End: 2021-01-12 | Stop reason: SDUPTHER

## 2020-08-07 NOTE — TELEPHONE ENCOUNTER
Please call patient and review with him and wife that we have contacted cardiology office and his cardiologist agrees that his medication should be adjusted. Cardiology is recommending that we decrease his propranolol to 80 mg daily. I have sent a new prescription for the lower dose propranolol to his pharmacy. They should pick this up and start the new dosing. The old 120 mg propranolol pills should be put away so they are not mistakenly taken.

## 2020-08-19 ENCOUNTER — OFFICE VISIT (OUTPATIENT)
Dept: SURGERY | Age: 80
End: 2020-08-19

## 2020-08-19 VITALS
BODY MASS INDEX: 28.41 KG/M2 | DIASTOLIC BLOOD PRESSURE: 72 MMHG | WEIGHT: 181 LBS | SYSTOLIC BLOOD PRESSURE: 102 MMHG | TEMPERATURE: 97.5 F | HEIGHT: 67 IN

## 2020-08-19 PROCEDURE — 99024 POSTOP FOLLOW-UP VISIT: CPT | Performed by: SURGERY

## 2020-11-13 NOTE — TELEPHONE ENCOUNTER
Wife is calling in today to speak with a M. A. Patient Seen an infectious disease Dr. Lucio Talavera clinic they would like him to stop taking hydralazine 10 mg and would like you to prescribe something different in it's place, they think it could be causing his low grade fever.  They would like to see if this helps

## 2020-11-13 NOTE — TELEPHONE ENCOUNTER
Patient is already on a BB, ACE, CCB, and loop diuretic. An alpha-agonist like clonidine could also raise his temp and could also contribute to irritability. I would defer any further changes to cardiology office. Please advise infectious disease office to contact cardiology for discussion of change of hydralazine to another agent. Advise family we need input from cardiology to help determine next steps moving forward.

## 2020-12-06 NOTE — TELEPHONE ENCOUNTER
Patient  requesting medication refill.  Please approve or deny this request.    Rx requested:  Requested Prescriptions     Pending Prescriptions Disp Refills    amLODIPine-benazepril (LOTREL) 5-20 MG per capsule 180 capsule 3     Sig: Take 1 capsule by mouth 2 times daily         Last Office Visit:   8/3/2020      Next Visit Date:  Future Appointments   Date Time Provider Iqra Dang   1/12/2021 10:20 AM MD [facility]

## 2020-12-07 RX ORDER — AMLODIPINE BESYLATE AND BENAZEPRIL HYDROCHLORIDE 5; 20 MG/1; MG/1
1 CAPSULE ORAL 2 TIMES DAILY
Qty: 180 CAPSULE | Refills: 3 | Status: SHIPPED | OUTPATIENT
Start: 2020-12-07 | End: 2021-01-12

## 2020-12-21 ENCOUNTER — TELEPHONE (OUTPATIENT)
Dept: FAMILY MEDICINE CLINIC | Age: 80
End: 2020-12-21

## 2020-12-21 NOTE — TELEPHONE ENCOUNTER
Spoke to Dr. Gary Peguero earlier today. She mentions that creatinine has increased to 1.7, and to consider taking patient off of the ACE inhibitor. We reviewed blood work back to 2017 and baseline creatinine has fluctuated. I stated that this would be reasonable.   Please have patient make a follow-up appointment this week, to discuss making medication change

## 2020-12-21 NOTE — TELEPHONE ENCOUNTER
Dr. Dionne Woods called regarding Dr. Randi Thomson  Patient. She says she believes that the adjustment in his blood pressure medication may be causing issues with his kidneys. She (Dr. Dionne Woods) is aware that Dr. Randi Thomson is out of the office this week. She is asking for a call at [phone].  Patrice Aguayo

## 2020-12-23 ENCOUNTER — VIRTUAL VISIT (OUTPATIENT)
Dept: FAMILY MEDICINE CLINIC | Age: 80
End: 2020-12-23
Payer: MEDICARE

## 2020-12-23 PROCEDURE — 99442 PR PHYS/QHP TELEPHONE EVALUATION 11-20 MIN: CPT | Performed by: FAMILY MEDICINE

## 2020-12-23 RX ORDER — AMLODIPINE BESYLATE 5 MG/1
5 TABLET ORAL DAILY
Qty: 30 TABLET | Refills: 0 | Status: SHIPPED | OUTPATIENT
Start: 2020-12-23 | End: 2021-02-22 | Stop reason: ALTCHOICE

## 2020-12-23 ASSESSMENT — ENCOUNTER SYMPTOMS
CHEST TIGHTNESS: 0
SHORTNESS OF BREATH: 0
CONSTIPATION: 0
APNEA: 0
BLOOD IN STOOL: 0
COUGH: 0
VOMITING: 0
NAUSEA: 0
ABDOMINAL PAIN: 0
DIARRHEA: 0

## 2020-12-23 NOTE — TELEPHONE ENCOUNTER
Wants blood work repeated to check on kidney function to see if it's getting better or worse. Dr. Claudia Kauffman did not order the labs because pt wanted them done close to home. Amlodipine besylate   5 mg was added in place of the hydralazine 10 mg.  Pt's wife said they want to talk about the medication changes and repeat blood work. They would want a call or to be seen what ever is best. Please advise.

## 2020-12-24 NOTE — PROGRESS NOTES
2020    TELEHEALTH EVALUATION -- [facility]Visual [facility])    Due to COVID 19 outbreak, patient's office visit was converted to a virtual visit. Patient was contacted and agreed to proceed with a virtual visit via Telephone Visit  The risks and benefits of converting to a virtual visit were discussed in light of the current infectious disease epidemic. Patient also understood that insurance coverage and co-pays are up to their individual insurance plans. Patient location: Home  Provider location: [place] office  HPI:    Jay Petty (:  1940) has requested an audio/video evaluation for the following concern(s):    Patient is an 68-year-old male presents today due to recent evaluation by rheumatology. It was noted that patient had elevation in kidney function with a creatinine of 1.72. He has had baseline fluctuations between 1.7 and 1.4 over the past 3 years. It was mentioned that patient should consider removing ACE inhibitor and rechecking kidney function. Wife states that blood pressure remains around 140/70-80 on his current medication, but he has had values in the 616O for systolic blood pressure. He denies any shortness of breath or chest pain. . Rheumatologist was going to place orders for repeat BMP, but patient would like to have it closer to home and is requesting that we place the order      Review of Systems   Constitutional: Negative for activity change, appetite change and fatigue. Respiratory: Negative for apnea, cough, chest tightness and shortness of breath. Cardiovascular: Negative for chest pain, palpitations and leg swelling. Gastrointestinal: Negative for abdominal pain, blood in stool, constipation, diarrhea, nausea and vomiting. Musculoskeletal: Negative for arthralgias. Neurological: Negative for seizures and headaches. Psychiatric/Behavioral: Negative for hallucinations and suicidal ideas. Prior to Visit Medications    Medication Sig Taking?  Authorizing Provider   amLODIPine (NORVASC) 5 MG tablet Take 1 tablet by mouth daily Yes Art Johnson MD   amLODIPine-benazepril (LOTREL) 5-20 MG per capsule Take 1 capsule by mouth 2 times daily  [place], MD   dicyclomine (BENTYL) 10 MG capsule Take 1 capsule by mouth 4 times daily (before meals and nightly)  [place], MD   propranolol (INDERAL LA) 80 MG extended release capsule Take 1 capsule by mouth daily  [place], MD   potassium chloride (KLOR-CON M) 20 MEQ extended release tablet Take 1 tablet by mouth daily  [place], MD   donepezil (ARICEPT) 10 MG tablet Take 1 tablet by mouth 2 times daily  [place], MD   hydrALAZINE (APRESOLINE) 10 MG tablet Take 1 tablet by mouth 3 times daily  [place], MD   atorvastatin (LIPITOR) 10 MG tablet Take 1 tablet by mouth daily  [facility], MD   zafirlukast (ACCOLATE) 20 MG tablet Take 20 mg by mouth daily  Historical Provider, MD [place] Mary 200-5 MCG/ACT inhaler   Historical Provider, MD   azelastine (ASTELIN) 0.1 % nasal spray 1 spray by Nasal route 2 times daily Use in each nostril as directed  Historical Provider, MD   mirabegron (MYRBETRIQ) 25 MG TB24 Take 25 mg by mouth  Historical Provider, MD   Magnesium 400 MG TABS Take by mouth  Historical Provider, MD   albuterol sulfate HFA (PROAIR HFA) 108 (90 Base) MCG/ACT inhaler 2 puffs by Other route  Historical Provider, MD   ELIQUIS 5 MG TABS tablet   Historical Provider, MD   torsemide (DEMADEX) 20 MG tablet Take 20 mg by mouth daily bid  Historical Provider, MD   primidone (MYSOLINE) 50 MG tablet Take 1 tablet by mouth 2 times daily  Patient taking differently: Take 50 mg by mouth 3 times daily   Tino Hernandez MD   aspirin 81 MG EC tablet Take 81 mg by mouth once a week   Historical Provider, MD       Social History     Tobacco Use    Smoking status: Never Smoker    Smokeless tobacco: Never Used Substance Use Topics    Alcohol use: Never     Frequency: Never    Drug use: No        Allergies   Allergen Reactions    Codeine      nausea    Morphine Other (See Comments)     Confused and disoriented    Norco [Hydrocodone-Acetaminophen] Other (See Comments)     Confusion    Singulair [Montelukast] Hives   ,   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    CAD (coronary artery disease)     status post stents in 2006 and 2008    Cervical stenosis of spinal canal     status post laminectomies in cervical spine    CHF (congestive heart failure) ([address] 6/6/2017    Diverticulosis     colonoscopy 9/2016    Diverticulosis of colon 6/6/2017    Fracture of rib of right side     Hernia     umbilical hernia repair    History of TIA (transient ischemic attack) 11/16/2017    HTN (hypertension) 8/26/2014    Hyperlipidemia     Hypertension     Iron deficiency anemia     Melanoma ([address]     right arm 2004    Memory loss 3/4/2014    Mild persistent asthma without complication 9/6/5631    Osteoarthritis     Paroxysmal atrial fibrillation ([address] 11/16/2017    Renal insufficiency     Status post total knee replacement 1/4/2012    Thoracic spine pain     chronic    TIA (transient ischemic attack) 1/16/2017    Tremor     chronic    Ulcerative colitis ([address] 1/20/2017   ,   Past Surgical History:   Procedure Laterality Date    COLONOSCOPY  2005     [facility]    COLONOSCOPY  09/16/2016    polyp, sigmoid colitis, diverticulosis (DR ROSARIO)    HERNIA REPAIR Bilateral 7/1/2020    BILATERAL INGUINAL HERNIA REPAIR performed by Yamilet Sommers MD at [facility] [address] Left     TOTAL KNEE ARTHROPLASTY Right     TOTAL KNEE ARTHROPLASTY Left     UMBILICAL HERNIA [address]  01/07/2015   ,   Social History     Tobacco Use    Smoking status: Never Smoker    Smokeless tobacco: Never Used Substance Use Topics    Alcohol use: Never     Frequency: Never    Drug use: No   ,   Family History   Adopted: Yes   ,   Immunization History   Administered Date(s) Administered    DTaP/Hib/IPV (Pentacel) 10/13/2016    Influenza Vaccine, unspecified formulation 09/28/2011, 10/16/2016    Influenza Virus Vaccine 10/01/2014, 09/09/2015, 09/08/2016    Influenza, High Dose (Fluzone 65 yrs and older) 09/28/2017    Influenza, Darrall Spittle, IM, (6 mo and older Fluzone, Flulaval, Fluarix and 3 yrs and older Afluria) 09/08/2016, 11/01/2019    Influenza, Quadv, IM, PF (6 mo and older Fluzone, Flulaval, Fluarix, and 3 yrs and older Afluria) 09/09/2015    Influenza, Triv, inactivated, subunit, adjuvanted, IM (Fluad 65 yrs and older) 10/09/2018, 09/17/2019    PPD Test 08/04/2016    Pneumococcal Conjugate 13-valent ([id number]) 10/16/2016    Pneumococcal Polysaccharide ([id number]) 11/30/2017    Tdap (Boostrix, Adacel) 03/09/2018    Zoster Live (Zostavax) 10/11/2013    Zoster Recombinant (Shingrix) 08/28/2018, 12/12/2018   ,   Health Maintenance   Topic Date Due    Annual Wellness Visit (AWV)  01/02/2021    Lipid screen  01/09/2021    Potassium monitoring  06/25/2021    Creatinine monitoring  06/25/2021    Colon cancer screen colonoscopy  09/16/2026    DTaP/Tdap/Td vaccine (3 - Td) 03/09/2028    Flu vaccine  Completed    Shingles Vaccine  Completed    Pneumococcal 65+ years Vaccine  Completed    Hepatitis A vaccine  Aged Out    Hepatitis B vaccine  Aged Out    Hib vaccine  Aged Out    Meningococcal (ACWY) vaccine  Aged Out       PHYSICAL EXAMINATION:  Unable due to telephone encounter    [] OTHER:      Due to this being a TeleHealth encounter, evaluation of the following organ systems is limited: Vitals/Constitutional/EENT/Resp/CV/GI//MS/Neuro/Skin/Heme-Lymph-Imm.     ASSESSMENT/PLAN:  1. Stage 3 chronic kidney disease, unspecified whether stage 3a or 3b CKD

## 2021-01-12 ENCOUNTER — VIRTUAL VISIT (OUTPATIENT)
Dept: FAMILY MEDICINE CLINIC | Age: 81
End: 2021-01-12
Payer: MEDICARE

## 2021-01-12 VITALS — TEMPERATURE: 97.6 F

## 2021-01-12 DIAGNOSIS — N32.81 OVERACTIVE BLADDER: Chronic | ICD-10-CM

## 2021-01-12 DIAGNOSIS — Z86.73 HISTORY OF TIA (TRANSIENT ISCHEMIC ATTACK): Chronic | ICD-10-CM

## 2021-01-12 DIAGNOSIS — I25.10 CORONARY ARTERY DISEASE INVOLVING NATIVE CORONARY ARTERY OF NATIVE HEART WITHOUT ANGINA PECTORIS: Chronic | ICD-10-CM

## 2021-01-12 DIAGNOSIS — Z00.00 ROUTINE GENERAL MEDICAL EXAMINATION AT A HEALTH CARE FACILITY: Primary | ICD-10-CM

## 2021-01-12 DIAGNOSIS — I10 ESSENTIAL HYPERTENSION: Chronic | ICD-10-CM

## 2021-01-12 DIAGNOSIS — I48.0 PAROXYSMAL ATRIAL FIBRILLATION (HCC): Chronic | ICD-10-CM

## 2021-01-12 DIAGNOSIS — E55.9 VITAMIN D DEFICIENCY: ICD-10-CM

## 2021-01-12 DIAGNOSIS — R41.3 MEMORY LOSS: Chronic | ICD-10-CM

## 2021-01-12 DIAGNOSIS — G25.0 BENIGN HEAD TREMOR: Chronic | ICD-10-CM

## 2021-01-12 DIAGNOSIS — I50.9 CHRONIC CONGESTIVE HEART FAILURE, UNSPECIFIED HEART FAILURE TYPE (HCC): ICD-10-CM

## 2021-01-12 DIAGNOSIS — E78.5 HYPERLIPIDEMIA, UNSPECIFIED HYPERLIPIDEMIA TYPE: Chronic | ICD-10-CM

## 2021-01-12 DIAGNOSIS — K51.90 ULCERATIVE COLITIS WITHOUT COMPLICATIONS, UNSPECIFIED LOCATION (HCC): ICD-10-CM

## 2021-01-12 PROCEDURE — G0439 PPPS, SUBSEQ VISIT: HCPCS | Performed by: FAMILY MEDICINE

## 2021-01-12 RX ORDER — PRIMIDONE 50 MG/1
50 TABLET ORAL 3 TIMES DAILY
Qty: 90 TABLET | Refills: 1 | Status: SHIPPED | OUTPATIENT
Start: 2021-01-12

## 2021-01-12 RX ORDER — PROPRANOLOL HYDROCHLORIDE 80 MG/1
80 CAPSULE, EXTENDED RELEASE ORAL DAILY
Qty: 90 CAPSULE | Refills: 1 | Status: SHIPPED | OUTPATIENT
Start: 2021-01-12 | End: 2021-07-26

## 2021-01-12 RX ORDER — POTASSIUM CHLORIDE 20 MEQ/1
20 TABLET, EXTENDED RELEASE ORAL DAILY
Qty: 90 TABLET | Refills: 1 | Status: SHIPPED | OUTPATIENT
Start: 2021-01-12 | End: 2021-10-21

## 2021-01-12 RX ORDER — ATORVASTATIN CALCIUM 10 MG/1
10 TABLET, FILM COATED ORAL DAILY
Qty: 90 TABLET | Refills: 1 | Status: SHIPPED | OUTPATIENT
Start: 2021-01-12 | End: 2021-02-18 | Stop reason: SDUPTHER

## 2021-01-12 RX ORDER — TRAMADOL HYDROCHLORIDE 50 MG/1
50 TABLET ORAL PRN
COMMUNITY
Start: 2020-11-03 | End: 2021-11-03 | Stop reason: SDUPTHER

## 2021-01-12 RX ORDER — ACETAMINOPHEN 160 MG
1 TABLET,DISINTEGRATING ORAL DAILY
Qty: 90 CAPSULE | Refills: 3 | Status: SHIPPED | OUTPATIENT
Start: 2021-01-12

## 2021-01-12 ASSESSMENT — PATIENT HEALTH QUESTIONNAIRE - PHQ9
SUM OF ALL RESPONSES TO PHQ9 QUESTIONS 1 & 2: 0
1. LITTLE INTEREST OR PLEASURE IN DOING THINGS: 0
SUM OF ALL RESPONSES TO PHQ QUESTIONS 1-9: 0

## 2021-01-12 NOTE — PATIENT INSTRUCTIONS
Personalized Preventive Plan for Edna Zimmerman - 1/12/2021  Medicare offers a range of preventive health benefits. Some of the tests and screenings are paid in full while other may be subject to a deductible, co-insurance, and/or copay. Some of these benefits include a comprehensive review of your medical history including lifestyle, illnesses that may run in your family, and various assessments and screenings as appropriate. After reviewing your medical record and screening and assessments performed today your provider may have ordered immunizations, labs, imaging, and/or referrals for you. A list of these orders (if applicable) as well as your Preventive Care list are included within your After Visit Summary for your review. Other Preventive Recommendations:    · A preventive eye exam performed by an eye specialist is recommended every 1-2 years to screen for glaucoma; cataracts, macular degeneration, and other eye disorders. · A preventive dental visit is recommended every 6 months. · Try to get at least 150 minutes of exercise per week or 10,000 steps per day on a pedometer . · Order or download the FREE \"Exercise & Physical Activity: Your Everyday Guide\" from [employer]. Call 9-416.486.7121 or search [employer] online. · You need 6092-9292 mg of calcium and 4894-6041 IU of vitamin D per day. It is possible to meet your calcium requirement with diet alone, but a vitamin D supplement is usually necessary to meet this goal.  · When exposed to the sun, use a sunscreen that protects against both UVA and UVB radiation with an SPF of 30 or greater. Reapply every 2 to 3 hours or after sweating, drying off with a towel, or swimming. · Always wear a seat belt when traveling in a car. Always wear a helmet when riding a bicycle or motorcycle. Heart-Healthy Diet   Sodium, Fat, and Cholesterol Controlled Diet       What Is a Heart Healthy Diet? A heart-healthy diet is one that limits sodium , certain types of fat , and cholesterol . This type of diet is recommended for:   People with any form of cardiovascular disease (eg, coronary heart disease , peripheral vascular disease , previous heart attack , previous stroke )   People with risk factors for cardiovascular disease, such as high blood pressure , high cholesterol , or diabetes   Anyone who wants to lower their risk of developing cardiovascular disease   Sodium    Sodium is a mineral found in many foods. In general, most people consume much more sodium than they need. Diets high in sodium can increase blood pressure and lead to edema (water retention). On a heart-healthy diet, you should consume no more than 2,300 mg (milligrams) of sodium per dayabout the amount in one teaspoon of table salt. The foods highest in sodium include table salt (about 50% sodium), processed foods, convenience foods, and preserved foods. Cholesterol    Cholesterol is a fat-like, waxy substance in your blood. Our bodies make some cholesterol. It is also found in animal products, with the highest amounts in fatty meat, egg yolks, whole milk, cheese, shellfish, and organ meats. On a heart-healthy diet, you should limit your cholesterol intake to less than 200 mg per day. It is normal and important to have some cholesterol in your bloodstream. But too much cholesterol can cause plaque to build up within your arteries, which can eventually lead to a heart attack or stroke. The two types of cholesterol that are most commonly referred to are:   Low-density lipoprotein (LDL) cholesterol  Also known as bad cholesterol, this is the cholesterol that tends to build up along your arteries. Bad cholesterol levels are increased by eating fats that are saturated or hydrogenated. Optimal level of this cholesterol is less than 100. Over 130 starts to get risky for heart disease. High-density lipoprotein (HDL) cholesterol  Also known as good cholesterol, this type of cholesterol actually carries cholesterol away from your arteries and may, therefore, help lower your risk of having a heart attack. You want this level to be high (ideally greater than 60). It is a risk to have a level less than 40. You can raise this good cholesterol by eating olive oil, canola oil, avocados, or nuts. Exercise raises this level, too. Fat    Fat is calorie dense and packs a lot of calories into a small amount of food. Even though fats should be limited due to their high calorie content, not all fats are bad. In fact, some fats are quite healthful. Fat can be broken down into four main types.    The good-for-you fats are:   Monounsaturated fat  found in oils such as olive and canola, avocados, and nuts and natural nut butters; can decrease cholesterol levels, while keeping levels of HDL cholesterol high   Polyunsaturated fat  found in oils such as safflower, sunflower, soybean, corn, and sesame; can decrease total cholesterol and LDL cholesterol   Omega-3 fatty acids  particularly those found in fatty fish (such as salmon, trout, tuna, mackerel, herring, and sardines); can decrease risk of arrhythmias, decrease triglyceride levels, and slightly lower blood pressure   The fats that you want to limit are:   Saturated fat  found in animal products, many fast foods, and a few vegetables; increases total blood cholesterol, including LDL levels   Animal fats that are saturated include: butter, lard, whole-milk dairy products, meat fat, and poultry skin   Vegetable fats that are saturated include: hydrogenated shortening, palm oil, coconut oil, cocoa butter   Hydrogenated or trans fat  found in margarine and vegetable shortening, most shelf stable snack foods, and fried foods; increases LDL and decreases HDL It is generally recommended that you limit your total fat for the day to less than 30% of your total calories. If you follow an 1800-calorie heart healthy diet, for example, this would mean 60 grams of fat or less per day. Saturated fat and trans fat in your diet raises your blood cholesterol the most, much more than dietary cholesterol does. For this reason, on a heart-healthy diet, less than 7% of your calories should come from saturated fat and ideally 0% from trans fat. On an 1800-calorie diet, this translates into less than 14 grams of saturated fat per day, leaving 46 grams of fat to come from mono- and polyunsaturated fats.    Food Choices on a Heart Healthy Diet   Food Category   Foods Recommended   Foods to Avoid   Grains   Breads and rolls without salted tops Most dry and cooked cereals Unsalted crackers and breadsticks Low-sodium or homemade breadcrumbs or stuffing All rice and pastas   Breads, rolls, and crackers with salted tops High-fat baked goods (eg, muffins, donuts, pastries) Quick breads, self-rising flour, and biscuit mixes Regular bread crumbs Instant hot cereals Commercially prepared rice, pasta, or stuffing mixes   Vegetables   Most fresh, frozen, and low-sodium canned vegetables Low-sodium and salt-free vegetable juices Canned vegetables if unsalted or rinsed   Regular canned vegetables and juices, including sauerkraut and pickled vegetables Frozen vegetables with sauces Commercially prepared potato and vegetable mixes   Fruits   Most fresh, frozen, and canned fruits All fruit juices   Fruits processed with salt or sodium   Milk   Nonfat or low-fat (1%) milk Nonfat or low-fat yogurt Cottage cheese, low-fat ricotta, cheeses labeled as low-fat and low-sodium   Whole milk Reduced-fat (2%) milk Malted and chocolate milk Full fat yogurt Most cheeses (unless low-fat and low salt) Buttermilk (no more than 1 cup per week)   Meats and Beans Lean cuts of fresh or frozen beef, veal, lamb, or pork (look for the word loin) Fresh or frozen poultry without the skin Fresh or frozen fish and some shellfish Egg whites and egg substitutes (Limit whole eggs to three per week) Tofu Nuts or seeds (unsalted, dry-roasted), low-sodium peanut butter Dried peas, beans, and lentils   Any smoked, cured, salted, or canned meat, fish, or poultry (including story, chipped beef, cold cuts, hot dogs, sausages, sardines, and anchovies) Poultry skins Breaded and/or fried fish or meats Canned peas, beans, and lentils Salted nuts   Fats and Oils   Olive oil and canola oil Low-sodium, low-fat salad dressings and mayonnaise   Butter, margarine, coconut and palm oils, story fat   Snacks, Sweets, and Condiments   Low-sodium or unsalted versions of broths, soups, soy sauce, and condiments Pepper, herbs, and spices; vinegar, lemon, or lime juice Low-fat frozen desserts (yogurt, sherbet, fruit bars) Sugar, cocoa powder, honey, syrup, jam, and preserves Low-fat, trans-fat free cookies, cakes, and pies Mohsen and animal crackers, fig bars, emiliano snaps   High-fat desserts Broth, soups, gravies, and sauces, made from instant mixes or other high-sodium ingredients Salted snack foods Canned olives Meat tenderizers, seasoning salt, and most flavored vinegars   Beverages   Low-sodium carbonated beverages Tea and coffee in moderation Soy milk   Commercially softened water   Suggestions   Make whole grains, fruits, and vegetables the base of your diet. Choose heart-healthy fats such as canola, olive, and flaxseed oil, and foods high in heart-healthy fats, such as nuts, seeds, soybeans, tofu, and fish. Eat fish at least twice per week; the fish highest in omega-3 fatty acids and lowest in mercury include salmon, herring, mackerel, sardines, and canned chunk light tuna. If you eat fish less than twice per week or have high triglycerides, talk to your doctor about taking fish oil supplements. Read food labels. For products low in fat and cholesterol, look for fat free, low-fat, cholesterol free, saturated fat free, and trans fat freeAlso scan the Nutrition Facts Label, which lists saturated fat, trans fat, and cholesterol amounts. For products low in sodium, look for sodium free, very low sodium, low sodium, no added salt, and unsalted   Skip the salt when cooking or at the table; if food needs more flavor, get creative and try out different herbs and spices. Garlic and onion also add substantial flavor to foods. Trim any visible fat off meat and poultry before cooking, and drain the fat off after grijalva. Use cooking methods that require little or no added fat, such as grilling, boiling, baking, poaching, broiling, roasting, steaming, stir-frying, and sauting. Avoid fast food and convenience food. They tend to be high in saturated and trans fat and have a lot of added salt. Talk to a registered dietitian for individualized diet advice. Last Reviewed: March 2011 Shell Otero MS, MPH, RD   Updated: 3/29/2011   ·   Patient information: Weight loss treatments    INTRODUCTION  Obesity is a major international problem, and Americans are among the heaviest people in the world. The percentage of obese people in the [facility] has risen steadily. Many people find that although they initially lose weight by dieting, they quickly regain the weight after the diet ends. Because it so hard to keep weight off over time, it is important to have as much information and support as possible before starting a diet. You are most likely to be successful in losing weight and keeping it off when you believe that your body weight can be controlled. STARTING A WEIGHT LOSS PROGRAM  Some people like to talk to their doctor or nurse to get help choosing the best plan, monitoring progress, and getting advice and support along the way. To know what treatment (or combination of treatments) will work best, determine your body mass index (BMI) and waist circumference (measurement). The BMI is calculated from your height and weight. A person with a BMI between 25 and 29.9 is considered overweight   A person with a BMI of 30 or greater is considered to be obese  A waist circumference greater than 35 inches (88 cm) in women and 40 inches (102 cm) in men increases the risk of obesity-related complications, such as heart disease and diabetes. People who are obese and who have a larger waist size may need more aggressive weight loss treatment than others. Talk to your doctor or nurse for advice. Types of treatment  Based on your measurements and your medical history, your doctor or nurse can determine what combination of weight loss treatments would work best for you. Treatments may include changes in lifestyle, exercise, dieting, and, in some cases, weight loss medicines or weight loss surgery. Weight loss surgery, also called bariatric surgery, is reserved for people with severe obesity who have not responded to other weight loss treatments. SETTING A WEIGHT LOSS GOAL  It is important to set a realistic weight loss goal. Your first goal should be to avoid gaining more weight and staying at your current weight (or within 5 percent). Many people have a \"dream\" weight that is difficult or impossible to achieve. People at high risk of developing diabetes who are able to lose 5 percent of their body weight and maintain this weight will reduce their risk of developing diabetes by about 50 percent and reduce their blood pressure. This is a success. Losing more than 15 percent of your body weight and staying at this weight is an extremely good result, even if you never reach your \"dream\" or \"ideal\" weight. LIFESTYLE CHANGES  Programs that help you to change your lifestyle are usually run by psychologists or other professionals. The goals of lifestyle changes are to help you change your eating habits, become more active, and be more aware of how much you eat and exercise, helping you to make healthier choices. This type of treatment can be broken down into three steps: The triggers that make you want to eat   Eating   What happens after you eat  Triggers to eat  Determining what triggers you to eat involves figuring out what foods you eat and where and when you eat. To figure out what triggers you to eat, keep a record for a few days of everything you eat, the places where you eat, how often you eat, and the emotions you were feeling when you ate. For some people, the trigger is related to a certain time of day or night. For others, the trigger is related to a certain place, like sitting at a desk working. Eating  You can change your eating habits by breaking the chain of events between the trigger for eating and eating itself. There are many ways to do this.  For instance, you can:  Limit where you eat to a few places (eg, dining room)   Restrict the number of utensils (eg, only a fork) used for eating   Drink a sip of water between each bite Chew your food a certain number of times   Get up and stop eating every few minutes  What happens after you eat  Rewarding yourself for good eating behaviors can help you to develop better habits. This is not a reward for weight loss; instead, it is a reward for changing unhealthy behaviors. Do not use food as a reward. Some people find money, clothing, or personal care (eg, a hair cut, manicure, or massage) to be effective rewards. Treat yourself immediately after making better eating choices to reinforce the value of the good behavior. You need to have clear behavior goals, and you must have a time frame for reaching your goals. Reward small changes along the way to your final goal.  Other factors that contribute to successful weight loss  Changing your behavior involves more than just changing unhealthy eating habits; it also involves finding people around you to support your weight loss, reducing stress, and learning to be strong when tempted by food. Establish a \"coery\" system  Having a friend or family member available to provide support and reinforce good behavior is very helpful. The support person needs to understand your goals. Learn to be strong  Learning to be strong when tempted by food is an important part of losing weight. As an example, you will need to learn how to say \"no\" and continue to say no when urged to eat at parties and social gatherings. Develop strategies for events before you go, such as eating before you go or taking low-calorie snacks and drinks with you. Develop a support system  Having a support system is helpful when losing weight. This is why many commercial groups are successful. Family support is also essential; if your family does not support your efforts to lose weight, this can slow your progress or even keep you from losing weight. Positive thinking  People often have conversations with themselves in their head; these conversations can be positive or negative. If you eat a piece of cake that was not planned, you may respond by thinking, \"Oh, you stupid idiot, you've blown your diet! \" and as a result, you may eat more cake. A positive thought for the same event could be, \"Well, I ate cake when it was not on my plan. Now I should do something to get back on track. \" A positive approach is much more likely to be successful than a negative one. Reduce stress  Although stress is a part of everyday life, it can trigger uncontrolled eating in some people. It is important to find a way to get through these difficult times without eating or by eating low-calorie food, like raw vegetables. It may be helpful to imagine a relaxing place that allows you to temporarily escape from stress. With deep breaths and closed eyes, you can imagine this relaxing place for a few minutes. Self-help programs  Self-help programs like [employer] Watchers®, Overeaters Anonymous®, and Take Off Tonny (Neuropure)© work for some people. As with all weight loss programs, you are most likely to be successful with these plans if you make long-term changes in how you eat. CHOOSING A DIET  A calorie is a unit of energy found in food. Your body needs calories to function. The goal of any diet is to burn up more calories than you eat. How quickly you lose weight depends upon several factors, such as your age, gender, and starting weight. Older people have a slower metabolism than young people, so they lose weight more slowly. Men lose more weight than women of similar height and weight when dieting because they use more energy. People who are extremely overweight lose weight more quickly than those who are only mildly overweight. Try not to drink alcohol or drinks with added sugar, and most sweets (candy, cakes, cookies), since they rarely contain important nutrients. Portion-controlled diets  One simple way to diet is to buy packaged foods, like frozen low-calorie meals or meal-replacement canned drinks. A typical meal plan for one day may include:  A meal-replacement drink or breakfast bar for breakfast   A meal-replacement drink or a frozen low-calorie (250 to 350 calories) meal for lunch   A frozen low-calorie meal or other prepackaged, calorie-controlled meal, along with extra vegetables for dinner  This would give you 1000 to 1500 calories per day. Low-fat diet  To reduce the amount of fat in your diet, you can:  Eat low-fat foods. Low-fat foods are those that contain less than 30 percent of calories from fat. Fat is listed on the food facts label (figure 1). Count fat grams. For a 1500 calorie diet, this would mean about 45 g or fewer of fat per day. Low-carbohydrate diet  Low- and very-low-carbohydrate diets (eg, Atkins diet, [employer]) have become popular ways to lose weight quickly. With a very-low-carbohydrate diet, you eat between 0 and 60 grams of carbohydrates per day (a standard diet contains 200 to 300 grams of carbohydrates)   With a low-carbohydrate diet, you eat between 60 and 130 grams of carbohydrates per day  Carbohydrates are found in fruits, vegetables, and grains (including breads, rice, pasta, and cereal), alcoholic beverages, and in dairy products. Meat and fish do not contain carbohydrates. Side effects of very-low-carbohydrate diets can include constipation, headache, bad breath, muscle cramps, diarrhea, and weakness. Mediterranean diet  The term \"Mediterranean diet\" refers to a way of eating that is common in [place] regions around the [facility]. Although there is some variation in Mediterranean diets, there are some similarities.  Most Mediterranean diets include: A high level of monounsaturated fats (from olive or canola oil, walnuts, pecans, almonds) and a low level of saturated fats (from butter)   A high amount of vegetables, fruits, legumes, and grains (7 to 10 servings of fruits and vegetables per day)   A moderate amount of milk and dairy products, mostly in the form of cheese. Use low-fat dairy products (skim milk, fat-free yogurt, low-fat cheese). A relatively low amount of red meat and meat products. Substitute fish or poultry for red meat. For those who drink alcohol, a modest amount (mainly as red wine) may help to protect against cardiovascular disease. A modest amount is up to one (4 ounce) glass per day for women and up to two glasses per day for men. Which diet is best?  Studies have compared different diets, including:  Very-low-carbohydrate (Atkins)   Macronutrient balance controlling glycemic load (Zone®)   Reduced-calorie (Weight Watchers®)   Very-low-fat (Ornish)  No one diet is \"best\" for weight loss. Any diet will help you to lose weight if you stick with the diet. Therefore, it is important to choose a diet that includes foods you like. Fad diets  Fad diets often promise quick weight loss (more than 1 to 2 pounds per week) and may claim that you do not need to exercise or give up favorite foods. Some fad diets cost a lot of money, because you have to pay for seminars or pills. Fad diets generally lack any scientific evidence that they are safe and effective, but instead rely on \"before\" and \"after\" photos or testimonials. Diets that sound too good to be true usually are. These plans are a waste of time and money and are not recommended. A doctor, nurse, or nutritionist can help you find a safe and effective way to lose weight and keep it off. WEIGHT LOSS [place] a weight loss medicine may be helpful when used in combination with diet, exercise, and lifestyle changes. However, it is important to understand the risks and benefits of these medicines. It is also important to be realistic about your goal weight using a weight loss medicine; you may not reach your \"dream\" weight, but you may be able to reduce your risk of diabetes or heart disease. Weight loss medicines may be recommended for people who have not been able to lose weight with diet and exercise who have a:  BMI of 30 or more    BMI between 27 and 29.9 and have other medical problems, such as diabetes, high cholesterol, or high blood pressure  Two weight loss medicines are approved in the [place] for long-term use. These are sibutramine and orlistat. Other weight loss medicines (phentermine, diethylpropion) are available but are only approved for short-term use (up to 12 weeks). Sibutramine  Sibutramine (Meridia®, Reductil®) is a medicine that reduces your appetite. In people who take the medicine for one year, the average weight loss is 10 percent of the initial body weight (5 percent more than those who took a placebo treatment). Side effects of sibutramine include insomnia, dry mouth, and constipation. Increases in blood pressure can occur. Therefore, blood pressure is usually monitored during treatment. There is no evidence that sibutramine causes heart or lung problems (like dexfenfluramine and fenfluramine (Phen/Fen)). However, experts agree that sibutramine should not used by people with coronary heart disease, heart failure, uncontrolled hypertension, stroke, irregular heart rhythms, or peripheral vascular disease (poor circulation in the legs). Orlistat  Orlistat (Xenical® 120 mg capsules) is a medicine that reduces the amount of fat your body absorbs from the foods you eat. A lower-dose version is now available without a prescription (Mt® 60 mg capsules) in many countries, including the [place]. The medicine is recommended three times per day, taken with a meal; you can skip a dose if you skip a meal or if the meal contains no fat. After one year of treatment with orlistat, the average weight loss is approximately 8 to 10 percent of initial body weight (4 percent more than in those who took a placebo). Cholesterol levels often improve, and blood pressure sometimes falls. In people with diabetes, orlistat may help control blood sugar levels. Side effects occur in 15 to 10 percent of people and may include stomach cramps, gas, diarrhea, leakage of stool, or oily stools. These problems are more likely when you take orlistat with a high-fat meal (if more than 30 percent of calories in the meal are from fat). Side effects usually improve as you learn to avoid high-fat foods. Severe liver injury has been reported rarely in patients taking orlistat, but it is not known if orlistat caused the liver problems. Diet supplements  Diet supplements are widely used by people who are trying to lose weight, although the safety and efficacy of these supplements are often unproven. A few of the more common diet supplements are discussed below; none of these are recommended because they have not been studied carefully, and there is no proof they are safe or effective. Chitosan and wheat dextrin are ineffective for weight loss, and their use is not recommended. Ephedra, a compound related to ephedrine, is no longer available in the [facility] due to safety concerns. Many nonprescription diet pills previously contained ephedra. Although some studies have shown that ephedra helps with weight loss, there can be serious side effects (psychiatric symptoms, palpitations, and stomach upset), including death. There are not enough data about the safety and efficacy of chromium, ginseng, glucomannan, green tea, hydroxycitric acid, L carnitine, psyllium, pyruvate supplements, Edgar wort, and conjugated linoleic acid. Two supplements from [facility], [address] [place] (also known as the Zander Kwok 15 pill) and Herbathin dietary supplement, have been shown to contain prescription drugs. Hoodia gordonii is a dietary supplement derived from a plant in [place]. It is not recommended because there is no proof that it is safe or effective. Bitter orange (Citrus aurantium) can increase your heart rate and blood pressure and is not recommended. SHOULD I HAVE SURGERY TO LOSE WEIGHT?  Weight loss surgery is recommended ONLY for people with one of the following:  Severe obesity (body mass index above 40) (calculator 1 and calculator 2) who have not responded to diet, exercise, or weight loss medicines   Body mass index between 35 and 40, along with a serious medical problem (including diabetes, severe joint pain, or sleep apnea) that would improve with weight loss  You should be sure that you understand the potential risks and benefits of weight loss surgery. You must be motivated and willing to make lifelong changes in how you eat to reach and maintain a healthier weight after surgery. You must also be realistic about weight loss after surgery (see 'Effectiveness of weight loss surgery' below). PREPARING FOR WEIGHT LOSS SURGERY  Most people who have weight loss surgery will meet with several specialists before surgery is scheduled. This often includes a dietitian, mental health counselor, a doctor who specializes in care of obese people, and a surgeon who performs weight loss surgery (bariatric surgeon). You may need to work with these providers for several weeks or months before surgery. The nutritionist will explain what and how much you will be able to eat after surgery. You may also need to lose a small amount of weight before surgery. The mental health specialist will help you to cope with stress and other factors that can make it harder to lose weight or trigger you to eat   The medical doctor will determine whether you need other tests, counseling, or treatment before surgery. He or she might also help you begin a medical weight loss program so that you can lose some weight before surgery. The bariatric surgeon will meet with you to discuss the surgeries available to treat obesity. He or she will also make sure you are a good candidate for surgery. TYPES OF WEIGHT LOSS SURGERY  There are several types of weight loss surgeries, the most common being lap banding, gastric bypass, and gastric sleeve. Lap banding  Laparoscopic adjustable gastric banding (LAGB), or lap banding, is a surgery that uses an adjustable band around the opening to the stomach (figure 1). This reduces the amount of food that you can eat at one time. Lap banding is done through small incisions, with a laparoscope. The band can be adjusted after surgery, allowing you to eat more or less food. Adjustments to the size and tightness of the band are made by using a needle to add or remove fluid from a port (a small container under the skin that is connected to the band). Adding fluid to the band makes it tighter which restricts the amount of food you can eat and may help you to lose more weight. Lap banding is a popular choice because it is relatively simple to perform, can be adjusted or removed, and has a low risk of serious complications immediately after surgery. However, weight loss with the lap band depends on your ability to follow the program closely. You will need to prepare nutritious meals that [facility] with\" the band, not against it. For example, the lap band will not work well if you eat or drink a large amount of liquid calories (like ice cream). The band will not help you to feel full when you eat/drink liquid calories. Weight loss ranges from 45 to 75 percent after two years. As an example, a person who is 120 pounds overweight could expect to lose approximately 54 to 90 pounds in the two years after lap banding. Gastric bypass  Desiree-en-Y gastric bypass, also called gastric bypass, helps you to lose weight by reducing the amount of food you can eat and reducing the number of calories and nutrients you absorb from the food you eat. To perform gastric bypass, a surgeon creates a small stomach pouch by dividing the stomach and attaching it to the small intestine. This helps you to lose weight in two ways: The smaller stomach can hold less food than before surgery. This causes you to feel full after eating a very small amount of food or liquid. Over time, the pouch might stretch, allowing you to eat more food. The body absorbs fewer calories, since food bypasses most of the stomach as well as the upper small intestine. This new arrangement seems to decrease your appetite and change how you break down foods by changing the release of various hormones. Gastric bypass can be performed as open surgery (through an incision on the abdomen) or laparoscopically, which uses smaller incisions and smaller instruments. Both the laparoscopic and open techniques have risks and benefits. You and your surgeon should work together to decide which surgery, if any, is right for you. A team of support staff (dietitians, counselors, nurses)   Long-term follow-up after surgery   Hospital staff experienced with the care of weight loss patients. This includes nurses who are trained in the care of patients immediately after surgery and anesthesiologists who are experienced in caring for the morbidly obese. EFFECTIVENESS OF WEIGHT LOSS SURGERY  The goal of weight loss surgery is to reduce the risk of illness or death associated with obesity. Weight loss surgery can also help you to feel and look better, reduce the amount of money you spend on medicines, and cut down on sick days. As an example, weight loss surgery can improve health problems related to obesity (diabetes, high blood pressure, high cholesterol, sleep apnea) to the point that you need less or no medicine. Finally, weight loss surgery might reduce your risk of developing heart disease, cancer, and certain infections. AFTER WEIGHT LOSS SURGERY  You will need to stay in the hospital until your team feels that it is safe for you to leave (on average, one to three days). Do not drive if you are taking prescription pain medicine. Begin exercising as soon as possible once you have healed; most weight loss centers will design an exercise program for you. Once you are home, it is important to eat and drink exactly what your doctor and dietitian recommend. You will see your doctor, nurse, and dietitian on a regular basis after surgery to monitor your health, diet, and weight loss.    You will be able to slowly increase how much you eat over time, although it will always be important to:  Eat small, frequent meals and not skip meals   Chew your food slowly and completely   Avoid eating while \"distracted\" (such as eating while watching TV)   Stop eating when you feel full   Drink liquids at least 30 minutes before or after eating   Avoid foods high in fat or sugar   Take vitamin supplements, as recommended It can take several months to learn to listen to your body so that you know when you are hungry and when you are full. You may dislike foods you previously loved, and you may begin to prefer new foods. This can be a frustrating process for some people, so talk to your dietitian if you are having trouble. It usually takes between one and two years to lose weight after surgery. After reaching their goal weight, some people have plastic surgery (called \"body contouring\") to remove excess skin from the body, particularly in the abdominal area. Before you decide to have weight loss surgery, you must commit to staying healthy for life. This includes following up with your healthcare team, exercising most days of the week, and eating a sensible diet every day. It can be difficult to develop new eating and exercise habits after weight loss surgery, and you will have to work hard to stick to your goals. Recovering from surgery and losing weight can be stressful and emotional, and it is important to have the support of family and friends. Working with a , therapist, or support group can help you through the ups and downs. WHERE TO GET MORE INFORMATION  Your healthcare provider is the best source of information for questions and concerns related to your medical problem. This article will be updated as needed every four months on our Web site ([email or web address])  ·     Keep Your Memory Da Lu       Many factors can affect your ability to remembera hectic lifestyle, aging, stress, chronic disease, and certain medicines. But, there are steps you can take to sharpen your mind and help preserve your memory. Challenge Your Brain   Regularly challenging your mind may help keeps it in top shape. Good mental exercises include:   Crossword puzzlesUse a dictionary if you need it; you will learn more that way. Brainteasers Try some!    Crafts, such as wood working and sewing Hobbies, such as gardening and building model airplanes   SocializingVisit old friends or join groups to meet new ones. Reading   Learning a new language   Taking a class, whether it be art history or alex chi   TravelingExperience the food, history, and culture of your destination   Learning to use a computer   Going to museums, the theater, or thought-provoking movies   Changing things in your daily life, such as reversing your pattern in the grocery store or brushing your teeth using your nondominant hand   Use Memory Aids   There is no need to remember every detail on your own. These memory aids can help:   Calendars and day planners   Electronic organizers to store all sorts of helpful informationThese devices can \"beep\" to remind you of appointments. A book of days to record birthdays, anniversaries, and other occasions that occur on the same date every year   Detailed \"to-do\" lists and strategically placed sticky notes   Quick \"study\" sessionsBefore a gathering, review who will be there so their names will be fresh in your mind. Establish routinesFor example, keep your keys, wallet, and umbrella in the same place all the time or take medicine with your 8:00 AM glass of juice   Live a Healthy Life   Many actions that will keep your body strong will do the same for your mind. For example:   Talk to Your Doctor About Herbs and Supplements    Malnutrition and vitamin deficiencies can impair your mental function. For example, vitamin B12 deficiency can cause a range of symptoms, including confusion. But, what if your nutritional needs are being met? Can herbs and supplements still offer a benefit? Researchers have investigated a range of natural remedies, such as ginkgo , ginseng , and the supplement phosphatidylserine (PS). So far, though, the evidence is inconsistent as to whether these products can improve memory or thinking. If you are interested in taking herbs and supplements, talk to your doctor first because they may interact with other medicines that you are taking. Exercise Regularly    Among the many benefits of regular exercise are increased blood flow to the brain and decreased risk of certain diseases that can interfere with memory function. One study found that even moderate exercise has a beneficial effect. Examples of \"moderate\" exercise include:   Playing 18 holes of golf once a week, without a cart   Playing tennis twice a week   Walking one mile per day   Manage Stress    It can be tough to remember what is important when your mind is cluttered. Make time for relaxation. Choose activities that calm you down, and make it routine. Manage Chronic Conditions    Side effects of high blood pressure , diabetes, and heart disease can interfere with mental function. Many of the lifestyle steps discussed here can help manage these conditions. Strive to eat a healthy diet, exercise regularly, get stress under control, and follow your doctor's advice for your condition. Minimize Medications    Talk to your doctor about the medicines that you take. Some may be unnecessary. Also, healthy lifestyle habits may lower the need for certain drugs. Last Reviewed: April 2010 Yessi Molina MD   Updated: 4/13/2010   ·     [address]       As we get older, changes in balance, gait, strength, vision, hearing, and cognition make even the most youthful senior more prone to accidents. Falls are one of the leading health risks for older people.  This increased risk of falling is related to:   Aging process (eg, decreased muscle strength, slowed reflexes)   Higher incidence of chronic health problems (eg, arthritis, diabetes) that may limit mobility, agility or sensory awareness Side effects of medicine (eg, dizziness, blurred vision)especially medicines like prescription pain medicines and drugs used to treat mental health conditions   Depending on the brittleness of your bones, the consequences of a fall can be serious and long lasting. Home Life   Research by the Association of Aging [facility]) shows that some home accidents among older adults can be prevented by making simple lifestyle changes and basic modifications and repairs to the home environment. Here are some lifestyle changes that experts recommend:   Have your hearing and vision checked regularly. Be sure to wear prescription glasses that are right for you. Speak to your doctor or pharmacist about the possible side effects of your medicines. A number of medicines can cause dizziness. If you have problems with sleep, talk to your doctor. Limit your intake of alcohol. If necessary, use a cane or walker to help maintain your balance. Wear supportive, rubber-soled shoes, even at home. If you live in a region that gets wintry weather, you may want to put special cleats on your shoes to prevent you from slipping on the snow and ice. Exercise regularly to help maintain muscle tone, agility, and balance. Always hold the banister when going up or down stairs. Also, use  bars when getting in or out of the bath or shower, or using the toilet. To avoid dizziness, get up slowly from a lying down position. Sit up first, dangling your legs for a minute or two before rising to a standing position. Overall Home Safety Check   According to the Consumer Product Safety Commision's \"Older Consumer Home Safety Checklist,\" it is important to check for potential hazards in each room. And remember, proper lighting is an essential factor in home safety. If you cannot see clearly, you are more likely to fall.    Important questions to ask yourself include: Are lamp, electric, extension, and telephone cords placed out of the flow of traffic and maintained in good condition? Have frayed cords been replaced? Are all small rugs and runners slip resistant? If not, you can secure them to the floor with a special double-sided carpet tape. Are smoke detectors properly locatedone on every floor of your home and one outside of every sleeping area? Are they in good working order? Are batteries replaced at least once a year? Do you have a well-maintained carbon monoxide detector outside every sleeping are in your home? Does your furniture layout leave plenty of space to maneuver between and around chairs, tables, beds, and sofas? Are hallways, stairs and passages between rooms well lit? Can you reach a lamp without getting out of bed? Are floor surfaces well maintained? Shag rugs, high-pile carpeting, tile floors, and polished wood floors can be particularly slippery. Stairs should always have handrails and be carpeted or fitted with a non-skid tread. Is your telephone easily reachable. Is the cord safely tucked away? Room by Room   According to the Association of Aging, bathrooms and jack are the two most potentially hazardous rooms in your home. In the Kitchen    Be sure your stove is in proper working order and always make sure burners and the oven are off before you go out or go to sleep. Keep pots on the back burners, turn handles away from the front of the stove, and keep stove clean and free of grease build-up. Kitchen ventilation systems and range exhausts should be working properly. Keep flammable objects such as towels and pot holders away from the cooking area except when in use. Make sure kitchen curtains are tied back. Move cords and appliances away from the sink and hot surfaces. If extension cords are needed, install wiring guides so they do not hang over the sink, range, or working areas. Look for coffee pots, kettles and toaster ovens with automatic shut-offs. Keep a mop handy in the kitchen so you can wipe up spills instantly. You should also have a small fire extinguisher. Arrange your kitchen with frequently used items on lower shelves to avoid the need to stand on a stepstool to reach them. Make sure countertops are well-lit to avoid injuries while cutting and preparing food. In the Bathroom    Use a non-slip mat or decals in the tub and shower, since wet, soapy tile or porcelain surfaces are extremely slippery. Make sure bathroom rugs are non-skid or tape them firmly to the floor. Bathtubs should have at least one, preferably two, grab bars, firmly attached to structural supports in the wall. (Do not use built-in soap holders or glass shower doors as grab bars.)    Tub seats fitted with non-slip material on the legs allow you to wash sitting down. For people with limited mobility, bathtub transfer benches allow you to slide safely into the tub. Raised toilet seats and toilet safety rails are helpful for those with knee or hip problems. In the [place]    Make sure you use a nightlight and that the area around your bed is clear of potential obstacles. Be careful with electric blankets and never go to sleep with a heating pad, which can cause serious burns even if on a low setting. Use fire-resistant mattress covers and pillows, and NEVER smoke in bed. Keep a phone next to the bed that is programmed to dial 911 at the push of a button. If you have a chronic condition, you may want to sign on with an automatic call-in service. Typically the system includes a small pendant that connects directly to an emergency medical voice-response system. You should also make arrangements to stay in contact with someonefriend, neighbor, family memberon a regular schedule.    Fire Prevention It can be hard to live with an illness that cannot be cured. But if your health is getting worse, you may want to make decisions about end-of-life care. Planning for the end of your life does not mean that you are giving up. It is a way to make sure that your wishes are met. Clearly stating your wishes can make it easier for your loved ones. Making plans while you are still able may also ease your mind and make your final days less stressful and more meaningful. Follow-up care is a key part of your treatment and safety. Be sure to make and go to all appointments, and call your doctor if you are having problems. It's also a good idea to know your test results and keep a list of the medicines you take. What can you do to plan for the end of life? You can bring these issues up with your doctor. You do not need to wait until your doctor starts the conversation. You might start with \"I would not be willing to live with . Grayson Savant Jc Savant Jc Savant \" When you complete this sentence it helps your doctor understand your wishes. Talk openly and honestly with your doctor. This is the best way to understand the decisions you will need to make as your health changes. Know that you can always change your mind. Ask your doctor about commonly used life-support measures. These include tube feedings, breathing machines, and fluids given through a vein (IV). Understanding these treatments will help you decide whether you want them. You may choose to have these life-supporting treatments for a limited time. This allows a trial period to see whether they will help you. You may also decide that you want your doctor to take only certain measures to keep you alive. It is important to spell out these conditions so that your doctor and family understand them. Talk to your doctor about how long you are likely to live. He or she may be able to give you an idea of what usually happens with your specific illness. Think about preparing papers that state your wishes. This way there will not be any confusion about what you want. You can change your instructions at any time. Which papers should you prepare? Advance directives are legal papers that tell doctors how you want to be cared for at the end of your life. You do not need a  to write these papers. Ask your doctor or your state health department for information on how to write your advance directives. They may have the forms for each of these types of papers. Make sure your doctor has a copy of these on file, and give a copy to a family member or close friend. Consider a do-not-resuscitate order (DNR). This order asks that no extra treatments be done if your heart stops or you stop breathing. Extra treatments may include cardiopulmonary resuscitation (CPR), electrical shock to restart your heart, or a machine to breathe for you. If you decide to have a DNR order, ask your doctor to explain and write it. Place the order in your home where everyone can easily see it. Consider a living will. A living will explains your wishes about life support and other treatments at the end of your life if you become unable to speak for yourself. Living pritchett tell doctors to use or not use treatments that would keep you alive. You must have one or two witnesses or a notary present when you sign this form. A living will may be called something else in your state. Consider a medical power of . This form allows you to name a person to make decisions about your care if you are not able to. Most people ask a close friend or family member. Talk to this person about the kinds of treatments you want and those that you do not want. Make sure this person understands your wishes. A medical power of  may be called something else in your state. These legal papers are simple to change. Tell your doctor what you want to change, and ask him or her to make a note in your medical file. Give your family updated copies of the papers. Where can you learn more? Go to [email or web address] and sign in to your [employer] account. Enter P184 in the [employer] box to learn more about \"Advance Care Planning: Care Instructions. \"     If you do not have an account, please click on the \"Sign Up Now\" link. Current as of: December 9, 2019               Content Version: 12.6  © 8269-2096 [employer]. Care instructions adapted under license by [facility] ([facility]). If you have questions about a medical condition or this instruction, always ask your healthcare professional. [address] any warranty or liability for your use of this information. ·        Learning About Living Shefali Chairez  What is a living will? A living will, also called a declaration, is a legal form. It tells your family and your doctor your wishes when you can't speak for yourself. It's used by the health professionals who will treat you as you near the end of your life or if you get seriously hurt or ill. If you put your wishes in writing, your loved ones and others will know what kind of care you want. They won't need to guess. This can ease your mind and be helpful to others. And you can change or cancel your living will at any time. A living will is not the same as an estate or property will. An estate will explains what you want to happen with your money and property after you die. How do you use it? A living will is used to describe the kinds of treatment or life support you want as you near the end of your life or if you get seriously hurt or ill. Keep these facts in mind about living pr[place]tt. Your living will is used only if you can't speak or make decisions for yourself. Most often, one or more doctors must certify that you can't speak or decide for yourself before your living will takes effect. If you get better and can speak for yourself again, you can accept or refuse any treatment. It doesn't matter what you said in your living will. Some states may limit your right to refuse treatment in certain cases. For example, you may need to clearly state in your living will that you don't want artificial hydration and nutrition, such as being fed through a tube. Is a living will a legal document? A living will is a legal document. Each state has its own laws about living pritchett. And a living will may be called something else in your state. Here are some things to know about living pritchett. You don't need an  to complete a living will. But legal advice can be helpful if your state's laws are unclear. It can also help if your health history is complicated or your family can't agree on what should be in your living will. You can change your living will at any time. Some people find that their wishes about end-of-life care change as their health changes. If you make big changes to your living will, complete a new form. If you move to another state, make sure that your living will is legal in the state where you now live. In most cases, doctors will respect your wishes even if you have a form from a different state. You might use a universal form that has been approved by many states. This kind of form can sometimes be filled out and stored online. Your digital copy will then be available wherever you have a connection to the internet. The doctors and nurses who need to treat you can find it right away. Your state may offer an online registry. This is another place where you can store your living will online. It's a good idea to get your living will notarized. This means using a person called a  to watch two people sign, or witness, your living will. What should you know when you create a living will? Here are some questions to ask yourself as you make your living will:  Do you know enough about life support methods that might be used? If not, talk to your doctor so you know what might be done if you can't breathe on your own, your heart stops, or you can't swallow. What things would you still want to be able to do after you receive life-support methods? Would you want to be able to walk? To speak? To eat on your own? To live without the help of machines? Do you want certain Congregational practices performed if you become very ill? If you have a choice, where do you want to be cared for? In your home? At a hospital or nursing home? If you have a choice at the end of your life, where would you prefer to die? At home? In a hospital or nursing home? Somewhere else? Would you prefer to be buried or cremated? Do you want your organs to be donated after you die? What should you do with your living will? Make sure that your family members and your health care agent have copies of your living will (also called a declaration). Give your doctor a copy of your living will. Ask him or her to keep it as part of your medical record. If you have more than one doctor, make sure that each one has a copy. Put a copy of your living will where it can be easily found. For example, some people may put a copy on their refrigerator door. If you are using a digital copy, be sure your doctor, family members, and health care agent know how to find and access it. Where can you learn more? Go to https://chpepiceweb.GoTunes. org and sign in to your Giphy account. Enter C205 in the [employer] box to learn more about \"Learning About Living [place]. \" If you do not have an account, please click on the \"Sign Up Now\" link. Current as of: December 9, 2019               Content Version: 12.6  © 2006-2020 [employer]. Care instructions adapted under license by [facility] ([facility]). If you have questions about a medical condition or this instruction, always ask your healthcare professional. [address] any warranty or liability for your use of this information. ·        Learning About Medical Power of   What is a medical power of ? A medical power of , also called a durable power of  for health care, is one type of the legal forms called advance directives. It lets you name the person you want to make treatment decisions for you if you can't speak or decide for yourself. The person you choose is called your health care agent. This person is also called a health care proxy or health care surrogate. A medical power of  may be called something else in your state. How do you choose a health care agent? Choose your health care agent carefully. This person may or may not be a family member. Talk to the person before you make your final decision. Make sure he or she is comfortable with this responsibility. It's a good idea to choose someone who:  Is at least 25years old. Knows you well and understands what makes life meaningful for you. Understands your Hindu and moral values. Will do what you want, not what he or she wants. Will be able to make difficult choices at a stressful time. Will be able to refuse or stop treatment, if that is what you would want, even if you could die. Will be firm and confident with health professionals if needed. Will ask questions to get needed information. Lives near you or agrees to travel to you if needed. Your family may help you make medical decisions while you can still be part of that process. But it's important to choose one person to be your health care agent in case you aren't able to make decisions for yourself. If you don't fill out the legal form and name a health care agent, the decisions your family can make may be limited. A health care agent may be called something else in your state. Who will make decisions for you if you don't have a health care agent? If you don't have a health care agent or a living will, you may not get the care you want. Decisions may be made by family members who disagree about your medical care. Or decisions may be made by a medical professional who doesn't know you well. In some cases, a  makes the decisions. When you name a health care agent, it is very clear who has the power to make health decisions for you. How do you name a health care agent? You name your health care agent on a legal form. This form is usually called a medical power of . Ask your hospital, state bar association, or office on aging where to find these forms. You must sign the form to make it legal. Some states require you to get the form notarized. This means that a person called a  watches you sign the form and then he or she signs the form. Some states also require that two or more witnesses sign the form. Be sure to tell your family members and doctors who your health care agent is. Where can you learn more? Go to https://chpenaheedewchi.[email or web address] org and sign in to your Not iT account. Enter [id number] in the [facility] box to learn more about \"Learning About [address]. \"     If you do not have an account, please click on the \"Sign Up Now\" link. Current as of: December 9, 2019               Content Version: 12.6  © 0451-4591 [employer]. Care instructions adapted under license by [facility] [address] If you have questions about a medical condition or this instruction, always ask your healthcare professional. [place] [address] any warranty or liability for your use of this information.     ·

## 2021-01-12 NOTE — PROGRESS NOTES
Medicare Annual Wellness Visit  Name: Rocio Leger Date: 2021   MRN: [id number] Sex: Male   Age: 78 y.o. Ethnicity: Non-/Non    : 1940 Race: Howie Sanchez is here for Medicare AWV    Screenings for behavioral, psychosocial and functional/safety risks, and cognitive dysfunction are all negative except as indicated below. These results, as well as other patient data from the [address] form, are documented in Flowsheets linked to this Encounter. Allergies   Allergen Reactions    Codeine      nausea    Morphine Other (See Comments)     Confused and disoriented    Norco [Hydrocodone-Acetaminophen] Other (See Comments)     Confusion    Singulair [Montelukast] Hives         Prior to Visit Medications    Medication Sig Taking? Authorizing Provider   traMADol (ULTRAM) 50 MG tablet Take 50 mg by mouth as needed.  Yes Historical Provider, MD   potassium chloride (KLOR-CON M) 20 MEQ extended release tablet Take 1 tablet by mouth daily Yes [place], MD   propranolol (INDERAL LA) 80 MG extended release capsule Take 1 capsule by mouth daily Yes Sangeeta Junior MD   atorvastatin (LIPITOR) 10 MG tablet Take 1 tablet by mouth daily Yes [place], MD   primidone (MYSOLINE) 50 MG tablet Take 1 tablet by mouth 3 times daily Yes Sangeeta Junior MD   Cholecalciferol (VITAMIN D3) 50 MCG (2000) CAPS Take 1 capsule by mouth daily Yes [place], MD   amLODIPine (NORVASC) 5 MG tablet Take 1 tablet by mouth daily Yes Magda Callahan MD   dicyclomine (BENTYL) 10 MG capsule Take 1 capsule by mouth 4 times daily (before meals and nightly) Yes [place], MD   donepezil (ARICEPT) 10 MG tablet Take 1 tablet by mouth 2 times daily Yes Sangeeta Junior MD   [place] 200-5 MCG/ACT inhaler  Yes Historical Provider, MD azelastine (ASTELIN) 0.1 % nasal spray 1 spray by Nasal route 2 times daily Use in each nostril as directed Yes Historical Provider, MD   mirabegron (MYRBETRIQ) 25 MG TB24 Take 25 mg by mouth Yes Historical Provider, MD   Magnesium 400 MG TABS Take by mouth Yes Historical Provider, MD   albuterol sulfate HFA (PROAIR HFA) 108 (90 Base) MCG/ACT inhaler 2 puffs by Other route Yes Historical Provider, MD   ELIQUIS 5 MG TABS tablet  Yes Historical Provider, MD   torsemide (DEMADEX) 20 MG tablet Take 20 mg by mouth daily bid Yes Historical Provider, MD   aspirin 81 MG EC tablet Take 81 mg by mouth once a week  Yes Historical Provider, MD         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    CAD (coronary artery disease)     status post stents in 2006 and 2008    Cervical stenosis of spinal canal     status post laminectomies in cervical spine    CHF (congestive heart failure) ([address] 6/6/2017    Diverticulosis     colonoscopy 9/2016    Diverticulosis of colon 6/6/2017    Fracture of rib of right side     Hernia     umbilical hernia repair    History of TIA (transient ischemic attack) 11/16/2017    HTN (hypertension) 8/26/2014    Hyperlipidemia     Hypertension     Iron deficiency anemia     Melanoma ([address]     right arm 2004    Memory loss 3/4/2014    Mild persistent asthma without complication 1/3/8483    Osteoarthritis     Paroxysmal atrial fibrillation ([address] 11/16/2017    Renal insufficiency     Status post total knee replacement 1/4/2012    Thoracic spine pain     chronic    TIA (transient ischemic attack) 1/16/2017    Tremor     chronic    Ulcerative colitis ([address] 1/20/2017       Past Surgical History:   Procedure Laterality Date    COLONOSCOPY  2005     [facility]    COLONOSCOPY  09/16/2016    polyp, sigmoid colitis, diverticulosis (DR ROSARIO)    HERNIA REPAIR Bilateral 7/1/2020 BILATERAL INGUINAL HERNIA REPAIR performed by Carlos [facility]egas MD at [facility] Left     TOTAL KNEE ARTHROPLASTY Right     TOTAL KNEE ARTHROPLASTY Left     UMBILICAL HERNIA REPAIR      VENTRAL HERNIA REPAIR  01/07/2015         Family History   Adopted: Yes       CareTeam (Including outside providers/suppliers regularly involved in providing care):   Patient Care Team:  Jensen Mcgee MD as PCP - General (Family Medicine)  Jensen Mcgee MD as PCP - [facility] Empaneled Provider  Reji Herman MD as Consulting Physician (Interventional Cardiology)  Elaina Leal MD as Consulting Physician (Urology)  Savannah Drummond MD as Consulting Physician (Gastroenterology)  Leena Hernandez ([facility] [address] (Optometry)  Darien Linton as Consulting Physician (Pulmonology)  RODOLFO Alvarenga (Physician Assistant)  Jay Mayfield MD as Consulting Physician (Urology)  Belen Alvarez MD as Surgeon (Orthopedic Surgery)  Amira Cuellar DO as Consulting Physician (Neurology)    Wt Readings from Last 3 Encounters:   08/19/20 181 lb (82.1 kg)   07/15/20 184 lb (83.5 kg)   07/01/20 174 lb (78.9 kg)     No flowsheet data found. There is no height or weight on file to calculate BMI. Based upon direct observation of the patient, evaluation of cognition reveals remote memory intact, recent memory impaired. Patient's complete Health Risk Assessment and screening values have been reviewed and are found in Flowsheets. The following problems were reviewed today and where indicated follow up appointments were made and/or referrals ordered. Positive Risk Factor Screenings with Interventions:     Fall Risk:  2 or more falls in past year?: (!) yes  Fall with injury in past year?: no  Fall Risk Interventions:    · PT/OT currently being done at home for help with gait/balance    Cognitive:   Words recalled: 2 Words Recalled  Total Score Interpretation: Positive Mini-Cog Cognitive Impairment Interventions:  · Patient established with neurology, is currently taking Aricept. Will help patient return to the specialist to discuss further management. General Health and ACP:  General  In general, how would you say your health is?: Fair  In the past 7 days, have you experienced any of the following?  New or Increased Pain, New or Increased Fatigue, Loneliness, Social Isolation, Stress or Anger?: (!) Stress  Do you get the social and emotional support that you need?: Yes  Do you have a Living Will?: (!) No  Advance Directives     Power of  Living Will ACP-Advance Directive ACP-Power of     Not on File Not on File Not on File Not on File      General Health Risk Interventions:  · Stress: patient's comments regarding reasons for stress and/or anger: related to pandemic and stay at home order  · No Living Will: Patient referred to [place] Habits/Nutrition:  Health Habits/Nutrition  Do you exercise for at least 20 minutes 2-3 times per week?: Yes  Have you lost any weight without trying in the past 3 months?: No  Do you eat fewer than 2 meals per day?: No  Have you seen a dentist within the past year?: (!) No     Health Habits/Nutrition Interventions:  · Nutritional issues:  educational materials for healthy, well-balanced diet provided, educational materials to promote weight loss provided  · Dental exam overdue:  patient encouraged to make appointment with his/her dentist    Hearing/Vision:  No exam data present  Hearing/Vision  Do you or your family notice any trouble with your hearing?: (!) Yes  Do you have difficulty driving, watching TV, or doing any of your daily activities because of your eyesight?: No  Have you had an eye exam within the past year?: Yes  Hearing/Vision Interventions:  · Hearing concerns:  patient declines any further evaluation/treatment for hearing issues    Safety:  Safety  Do you have working smoke detectors?: Yes Have all throw rugs been removed or fastened?: (!) No  Do you have non-slip mats or surfaces in all bathtubs/showers?: (!) No  Do all of your stairways have a railing or banister?: Yes  Are your doorways, halls and stairs free of clutter?: Yes  Do you always fasten your seatbelt when you are in a car?: Yes  Safety Interventions:  · Home safety tips provided    ADL:  ADLs  In the past 7 days, did you need help from others to perform any of the following everyday activities? Eating, dressing, grooming, bathing, toileting, or walking/balance?: (!) Dressing  In the past 7 days, did you need help from others to take care of any of the following?  Laundry, housekeeping, banking/finances, shopping, telephone use, food preparation, transportation, or taking medications?: (!) Food Preparation, Transportation, Taking Medications, Telephone Use  ADL Interventions:  · Patient declines any further evaluation/treatment for this issue    Personalized Preventive Plan   Current Health Maintenance Status  Immunization History   Administered Date(s) Administered    DTaP/Hib/IPV (Pentacel) 10/13/2016    Influenza Vaccine, unspecified formulation 09/28/2011, 10/16/2016    Influenza Virus Vaccine 10/01/2014, 09/09/2015, 09/08/2016    Influenza, High Dose (Fluzone 65 yrs and older) 09/28/2017    Influenza, Blanca Levans, IM, (6 mo and older Fluzone, Flulaval, Fluarix and 3 yrs and older Afluria) 09/08/2016, 11/01/2019    Influenza, Quadv, IM, PF (6 mo and older Fluzone, Flulaval, Fluarix, and 3 yrs and older Afluria) 09/09/2015    Influenza, Triv, inactivated, subunit, adjuvanted, IM (Fluad 65 yrs and older) 10/09/2018, 09/17/2019    PPD Test 08/04/2016    Pneumococcal Conjugate 13-valent ([id number]) 10/16/2016    Pneumococcal Polysaccharide ([id number]) 11/30/2017    Tdap (Boostrix, Adacel) 03/09/2018    Zoster Live (Zostavax) 10/11/2013    Zoster Recombinant (Shingrix) 08/28/2018, 12/12/2018        Health Maintenance Topic Date Due    Annual Wellness Visit (AWV)  05/29/2019    Lipid screen  01/01/2022    Potassium monitoring  01/02/2022    Creatinine monitoring  01/02/2022    Colon cancer screen colonoscopy  09/16/2026    DTaP/Tdap/Td vaccine (3 - Td) 03/09/2028    Flu vaccine  Completed    Shingles Vaccine  Completed    Pneumococcal 65+ years Vaccine  Completed    Hepatitis A vaccine  Aged Out    Hepatitis B vaccine  Aged Out    Hib vaccine  Aged Out    Meningococcal (ACWY) vaccine  Aged Out     Recommendations for [place] Due: see orders and patient instructions/AVS.  . Recommended screening schedule for the next 5-10 years is provided to the patient in written form: see Patient Instructions/AVS.    Ayse Bucio was seen today for medicare awv. Problem List Items Addressed This Visit        Circulatory    CAD (coronary artery disease) (Chronic)     Stable. No reported chest pain or change in activity tolerance. Relevant Medications    propranolol (INDERAL LA) 80 MG extended release capsule    atorvastatin (LIPITOR) 10 MG tablet    HTN (hypertension) (Chronic)     Blood pressure medication changes were recommended by rheumatology owing to reported fevers of unknown origin at home. Patient was seen by cardiology and hydralazine and benazepril have been discontinued. Since these changes patient and wife are reporting elevated blood pressure values at home. I instructed them to call cardiology office to discuss further medication recommendations since changes were recently made by the specialist.         Relevant Medications    potassium chloride (KLOR-CON M) 20 MEQ extended release tablet    propranolol (INDERAL LA) 80 MG extended release capsule    CHF (congestive heart failure) (HCC) (Chronic)     Stable. No reported worsening lower extremity edema or change in baseline activity tolerance.   We will continue to monitor symptomatically over time Paroxysmal atrial fibrillation (HCC) (Chronic)     Stable. Currently asymptomatic and historically well controlled. He remains on anticoagulation without signs of bleeding. Continue current management         Relevant Medications    propranolol (INDERAL LA) 80 MG extended release capsule       Digestive    Ulcerative colitis (HCC) (Chronic)     Stable. No reported abdominal complaints or change in appetite. No reported change in baseline stooling. We will continue to follow symptomatically over time            Nervous and Auditory    Benign head tremor (Chronic)    Relevant Medications    primidone (MYSOLINE) 50 MG tablet       Genitourinary    Overactive bladder (Chronic)     Patient and wife reporting more issues with urinary frequency causing disruption in normal daily routine and impacting quality of life. Medication has been prescribed by urology, but patient states he is not taking the medication due to reportedly being told at the specialist office that it was unlikely to provide any significant benefit. I advised patient and wife to try medication if it was prescribed and follow-up with urology to discuss any potential further management options. Other    Hyperlipidemia (Chronic)    Relevant Medications    propranolol (INDERAL LA) 80 MG extended release capsule    atorvastatin (LIPITOR) 10 MG tablet    Memory loss (Chronic)     Patient and wife reporting worsening memory despite use of Aricept. He has been established with a new neurologist and I will have office staff help him move up his next scheduled visit with the specialist to discuss further management options. History of TIA (transient ischemic attack) (Chronic)     Stable. Patient status post recent hospital admission. No persistent new onset neurologic deficit.   He has been established with new neurologist and I will have office staff help him move up his next scheduled visit with the specialist.

## 2021-01-13 NOTE — ASSESSMENT & PLAN NOTE
Patient and wife reporting worsening memory despite use of Aricept. He has been established with a new neurologist and I will have office staff help him move up his next scheduled visit with the specialist to discuss further management options.

## 2021-01-13 NOTE — ASSESSMENT & PLAN NOTE
Stable. Patient status post recent hospital admission. No persistent new onset neurologic deficit.   He has been established with new neurologist and I will have office staff help him move up his next scheduled visit with the specialist.

## 2021-01-13 NOTE — ASSESSMENT & PLAN NOTE
Stable. Currently asymptomatic and historically well controlled. He remains on anticoagulation without signs of bleeding.   Continue current management

## 2021-01-13 NOTE — ASSESSMENT & PLAN NOTE
Stable. No reported abdominal complaints or change in appetite. No reported change in baseline stooling.   We will continue to follow symptomatically over time

## 2021-01-13 NOTE — ASSESSMENT & PLAN NOTE
Stable. No reported worsening lower extremity edema or change in baseline activity tolerance.   We will continue to monitor symptomatically over time

## 2021-01-13 NOTE — ASSESSMENT & PLAN NOTE
Patient and wife reporting more issues with urinary frequency causing disruption in normal daily routine and impacting quality of life. Medication has been prescribed by urology, but patient states he is not taking the medication due to reportedly being told at the specialist office that it was unlikely to provide any significant benefit. I advised patient and wife to try medication if it was prescribed and follow-up with urology to discuss any potential further management options.

## 2021-01-14 NOTE — BRIEF OP NOTE
Brief Postoperative Note      Patient: Sheryle Bald  YOB: 1940  MRN: [id number]    Date of Procedure: 7/1/2020    Pre-Op Diagnosis: BILATERAL INGUINAL HERNIA    Post-Op Diagnosis: Same       Procedure(s):  BILATERAL INGUINAL HERNIA REPAIR    Surgeon(s):  Rudy Hill MD    Assistant:  First Assistant: [address]    Anesthesia: General    Estimated Blood Loss (mL): less than 50     Complications: None    Specimens:   * No specimens in log *    Implants:  Implant Name Type Inv.  Item Serial No.  Lot No. LRB No. Used Action   MESH SURG VELIA PLUG PERFIX XL 1.6X2IN Mesh MESH SURG VELIA PLUG PERFIX XL 1.6X2IN  [employer] [id number] Left 1 Implanted   MESH PLUG PERFIX LIGHT XL 1.5X2IN Mesh MESH PLUG PERFIX LIGHT XL 1.5X2IN  [employer] [id number] Right 1 Implanted         Drains: * No LDAs found *    Findings: Left side very large indirect hernia and right side medium indirect hernia    Electronically signed by Rudy Hill MD on 7/1/2020 at 9:00 AM Detail Level: None

## 2021-01-15 ENCOUNTER — TELEPHONE (OUTPATIENT)
Dept: SPIRITUAL SERVICES | Age: 81
End: 2021-01-15

## 2021-01-15 NOTE — TELEPHONE ENCOUNTER
Spoke with the patient's wife. She will bring a copy of their Advanced directives into the [address] [place] [address] office the next time they are scheduled to visit. Patient's wife was reluctant to give more information. She did note that she would be the primary decision maker for her  and that their daughter, Carrie Mcintosh, would be the secondary decision maker.

## 2021-01-15 NOTE — ACP (ADVANCE CARE PLANNING)
Spoke with the patient's wife. She stated that they had Advanced Directive documentation already. I encouraged her to take a copy of the documents to their doctor the next time they were scheduled to see him so he would have a copy for his records. Patient's wife was not to eager to discuss more information over the phone. She did state the she would be the primary decisionmaker for her  and that her daughter Talita Hartmann (would not provide phone number) would be the secondary.

## 2021-01-26 DIAGNOSIS — R41.3 MEMORY LOSS: Chronic | ICD-10-CM

## 2021-01-26 RX ORDER — DONEPEZIL HYDROCHLORIDE 10 MG/1
TABLET, FILM COATED ORAL
Qty: 180 TABLET | Refills: 1 | Status: SHIPPED | OUTPATIENT
Start: 2021-01-26 | End: 2021-10-21

## 2021-02-18 DIAGNOSIS — E78.5 HYPERLIPIDEMIA, UNSPECIFIED HYPERLIPIDEMIA TYPE: Chronic | ICD-10-CM

## 2021-02-18 RX ORDER — ATORVASTATIN CALCIUM 10 MG/1
10 TABLET, FILM COATED ORAL DAILY
Qty: 90 TABLET | Refills: 1 | Status: SHIPPED | OUTPATIENT
Start: 2021-02-18 | End: 2021-08-23 | Stop reason: SDUPTHER

## 2021-02-18 NOTE — TELEPHONE ENCOUNTER
Patient wife requesting medication refill.  Please approve or deny this request.    Rx requested:  Requested Prescriptions     Pending Prescriptions Disp Refills    atorvastatin (LIPITOR) 10 MG tablet 90 tablet 1     Sig: Take 1 tablet by mouth daily         Last Office Visit:   1/12/2021      Next Visit Date:  Future Appointments   Date Time Provider Iqra Dang   4/12/2021  1:40 PM MD Manfred Byrnes [address]

## 2021-02-22 ENCOUNTER — HOSPITAL ENCOUNTER (EMERGENCY)
Age: 81
Discharge: HOME | End: 2021-02-22
Attending: EMERGENCY MEDICINE
Payer: MEDICARE

## 2021-02-22 VITALS
HEIGHT: 67 IN | OXYGEN SATURATION: 93 % | HEART RATE: 59 BPM | DIASTOLIC BLOOD PRESSURE: 87 MMHG | TEMPERATURE: 99.6 F | WEIGHT: 190 LBS | BODY MASS INDEX: 29.82 KG/M2 | SYSTOLIC BLOOD PRESSURE: 168 MMHG | RESPIRATION RATE: 20 BRPM

## 2021-02-22 DIAGNOSIS — I16.0 HYPERTENSIVE URGENCY: Primary | ICD-10-CM

## 2021-02-22 PROCEDURE — 6370000000 HC RX 637 (ALT 250 FOR IP): Performed by: EMERGENCY MEDICINE

## 2021-02-22 PROCEDURE — 99283 EMERGENCY DEPT VISIT LOW MDM: CPT

## 2021-02-22 RX ORDER — CLONIDINE HYDROCHLORIDE 0.1 MG/1
0.1 TABLET ORAL PRN
Qty: 14 TABLET | Refills: 3 | Status: SHIPPED | OUTPATIENT
Start: 2021-02-22 | End: 2021-04-12

## 2021-02-22 RX ORDER — DICYCLOMINE HCL 20 MG
20 TABLET ORAL 3 TIMES DAILY
COMMUNITY
End: 2021-03-15

## 2021-02-22 RX ORDER — CLONIDINE HYDROCHLORIDE 0.1 MG/1
0.2 TABLET ORAL ONCE
Status: COMPLETED | OUTPATIENT
Start: 2021-02-22 | End: 2021-02-22

## 2021-02-22 RX ORDER — SPIRONOLACTONE 25 MG/1
25 TABLET ORAL DAILY
COMMUNITY
End: 2021-04-12

## 2021-02-22 RX ADMIN — CLONIDINE HYDROCHLORIDE 0.2 MG: 0.1 TABLET ORAL at 22:13

## 2021-02-23 NOTE — ED PROVIDER NOTES
 Iron deficiency anemia     Melanoma ([facility] [address]     right arm 2004    Memory loss 3/4/2014    Mild persistent asthma without complication 4/8/8724    Osteoarthritis     Paroxysmal atrial fibrillation (HCC) 11/16/2017    Renal insufficiency     Status post total knee replacement 1/4/2012    Thoracic spine pain     chronic    TIA (transient ischemic attack) 1/16/2017    Tremor     chronic    Ulcerative colitis ([facility] [address] 1/20/2017         SURGICAL HISTORY       Past Surgical History:   Procedure Laterality Date    [facility]OSCOPY  2005     [facility]    COLONOSCOPY  09/16/2016    polyp, sigmoid colitis, diverticulosis (DR ROSARIO)    HERNIA REPAIR Bilateral 7/1/2020    BILATERAL INGUINAL HERNIA REPAIR performed by Loulou Dennison MD at [facility] [address] Left     TOTAL KNEE ARTHROPLASTY Right     TOTAL KNEE ARTHROPLASTY Left     UMBILICAL HERNIA REPAIR      VENTRAL HERNIA REPAIR  01/07/2015         CURRENT MEDICATIONS       Previous Medications    ALBUTEROL SULFATE HFA (PROAIR HFA) 108 (90 BASE) MCG/ACT INHALER    2 puffs by Other route    ASPIRIN 81 MG EC TABLET    Take 81 mg by mouth once a week     ATORVASTATIN (LIPITOR) 10 MG TABLET    Take 1 tablet by mouth daily    AZELASTINE (ASTELIN) 0.1 % NASAL SPRAY    1 spray by Nasal route 2 times daily Use in each nostril as directed    CHOLECALCIFEROL (VITAMIN D3) 50 MCG (2000 UT) CAPS    Take 1 capsule by mouth daily    DICYCLOMINE (BENTYL) 20 MG TABLET    Take 20 mg by mouth 3 times daily    DONEPEZIL (ARICEPT) 10 MG TABLET    take 1 tablet by mouth twice a day    DULERA 200-5 MCG/ACT INHALER        ELIQUIS 5 MG TABS TABLET    5 mg 2 times daily     MAGNESIUM 400 MG TABS    Take by mouth    MIRABEGRON (MYRBETRIQ) 25 MG TB24    Take 25 mg by mouth    POTASSIUM CHLORIDE (KLOR-CON M) 20 MEQ EXTENDED RELEASE TABLET    Take 1 tablet by mouth daily    PRIMIDONE (MYSOLINE) 50 MG TABLET    Take 1 tablet by mouth 3 times daily PROPRANOLOL (INDERAL LA) 80 MG EXTENDED RELEASE CAPSULE    Take 1 capsule by mouth daily    SPIRONOLACTONE (ALDACTONE) 25 MG TABLET    Take 25 mg by mouth daily    TORSEMIDE (DEMADEX) 20 MG TABLET    Take 20 mg by mouth daily bid    TRAMADOL (ULTRAM) 50 MG TABLET    Take 50 mg by mouth as needed.        ALLERGIES     Codeine, Morphine, Norco [hydrocodone-acetaminophen], and Singulair [montelukast]    FAMILY HISTORY       Family History   Adopted: Yes          SOCIAL HISTORY       Social History     Socioeconomic History    Marital status:      Spouse name: None    Number of children: None    Years of education: None    Highest education level: None   Occupational History    None   Social Needs    Financial resource strain: None    Food insecurity     Worry: None     Inability: None    Transportation needs     Medical: None     Non-medical: None   Tobacco Use    Smoking status: Never Smoker    Smokeless tobacco: Never Used   Substance and Sexual Activity    Alcohol use: Never     Frequency: Never    Drug use: No    Sexual activity: Yes     Partners: Female   Lifestyle    Physical activity     Days per week: None     Minutes per session: None    Stress: None   Relationships    Social connections     Talks on phone: None     Gets together: None     Attends Hinduism service: None     Active member of club or organization: None     Attends meetings of clubs or organizations: None     Relationship status: None    Intimate partner violence     Fear of current or ex partner: None     Emotionally abused: None     Physically abused: None     Forced sexual activity: None   Other Topics Concern    None   Social History Narrative    None       SCREENINGS    [place] Coma Scale  Eye Opening: Spontaneous  Best Verbal Response: Oriented  Best Motor Response: Obeys commands  [place] Coma Scale Score: 15          PHYSICAL EXAM    (up to 7 for level 4, 8 or more for level 5)     ED Triage Vitals BP Temp Temp Source Pulse Resp SpO2 Height Weight   02/22/21 2202 02/22/21 2202 02/22/21 2202 02/22/21 2202 02/22/21 2202 02/22/21 2202 02/22/21 2159 02/22/21 2159   (!) 216/107 99.6 °F (37.6 °C) Temporal 60 20 96 % 5' 7\" (1.702 m) 190 lb (86.2 kg)       Physical Exam  Vitals signs and nursing note reviewed. Constitutional:       General: He is not in acute distress. Appearance: Normal appearance. He is well-developed. HENT:      Head: Normocephalic and atraumatic. Mouth/Throat:      Mouth: Mucous membranes are moist.      Pharynx: Oropharynx is clear. Eyes:      Extraocular Movements: Extraocular movements intact. Conjunctiva/sclera: Conjunctivae normal.   Neck:      Musculoskeletal: Normal range of motion and neck supple. Cardiovascular:      Rate and Rhythm: Normal rate and regular rhythm. Pulmonary:      Effort: Pulmonary effort is normal.      Breath sounds: Normal breath sounds. Abdominal:      General: Bowel sounds are normal.      Palpations: Abdomen is soft. Musculoskeletal: Normal range of motion. General: No deformity. Skin:     General: Skin is warm and dry. Capillary Refill: Capillary refill takes less than 2 seconds. Neurological:      General: No focal deficit present. Mental Status: He is alert and oriented to person, place, and time. Mental status is at baseline. Cranial Nerves: No cranial nerve deficit. Comments: Tremors (baseline)   Psychiatric:         Thought Content:  Thought content normal.         DIAGNOSTIC RESULTS     EKG: All EKG's are interpreted by the Emergency Department Physician who either signs or Co-signs this chart in the absence of a cardiologist.    RADIOLOGY:   Non-plain film images such as CT, Ultrasound and MRI are read by the radiologist. Plain radiographic images are visualized and preliminarily interpreted by the emergency physician with the below findings: Interpretation per the Radiologist below, if available at the time of this note:    No orders to display       LABS:  Labs Reviewed - No data to display    All other labs were within normal range or not returned as of this dictation. EMERGENCY DEPARTMENT COURSE and DIFFERENTIAL DIAGNOSIS/MDM:   Vitals:    Vitals:    02/22/21 2231 02/22/21 2245 02/22/21 2300 02/22/21 2330   BP: (!) 208/93 (!) 192/93 (!) 187/92 (!) 168/87   Pulse: 59      Resp: 20      Temp:       TempSrc:       SpO2: 96% 94% 94% 93%   Weight:       Height:           MDM  Number of Diagnoses or Management Options  Hypertensive urgency  Diagnosis management comments: 78-year-old male presenting with elevated blood pressure. Notes no symptoms. His blood pressure was quite elevated on arrival and he was given 0.2 mg of clonidine. Blood pressure improved throughout ED course. 168/87 on discharge. He may need blood pressure meds changed. I gave a prescription for clonidine to use if pressures become significantly elevated in the interim. Patient will be discharged home in good condition. Patient has been otherwise hemodynamically stable throughout ED course and is appropriate for outpatient follow up. Patient should follow up with PCP in 2-3 days for BP recheck or return to ED immediately for any new or worsening symptoms. Patient is well appearing on discharge and agreeable with plan of care. Procedures    CRITICAL CARE TIME   Total Critical Care time was 0 minutes, excluding separately reportable procedures. There was a high probability of clinically significant/life threatening deterioration in the patient's condition which required my urgent intervention. FINAL IMPRESSION      1.  Hypertensive urgency Improving         DISPOSITION/PLAN   DISPOSITION Decision To Discharge 02/22/2021 11:34:56 PM (Please note that portions of this note were completed with a voice recognition program.  Efforts were made to edit the dictations but occasionally words are mis-transcribed.)    Alfredo Polanco MD (electronically signed)  Attending Emergency Physician        Alfredo Polanco MD  02/22/21 9028

## 2021-02-24 ENCOUNTER — TELEPHONE (OUTPATIENT)
Dept: ADMINISTRATIVE | Age: 81
End: 2021-02-24

## 2021-02-24 ENCOUNTER — NURSE TRIAGE (OUTPATIENT)
Dept: OTHER | Facility: CLINIC | Age: 81
End: 2021-02-24

## 2021-02-24 NOTE — TELEPHONE ENCOUNTER
[facility]  with red flag complaint. Brief description of triage: Hypertension    Triage indicates for patient to consult w/ NP for office visit or ER. Care advice provided, patient verbalizes understanding; denies any other questions or concerns; instructed to call back for any new or worsening symptoms. Writer provided warm transfer to Jairo Jacobson at [facility] for appointment scheduling. Attention Provider: Thank you for allowing me to participate in the care of your patient. The patient was connected to triage in response to information provided to the [facility]. Please do not respond through this encounter as the response is not directed to a shared pool. 2nd level triage completed with Taisha Branham NP; provider recommends patient be seen be seen in office the am w/ PCP for ER follow up visit, if this is not possible pt/wife agrees to go to Urgent care/ER. Reason for Disposition   Systolic BP >= 682 OR Diastolic >= 681, and any cardiac or neurologic symptoms (e.g., chest pain, difficulty breathing, unsteady gait, blurred vision)    Answer Assessment - Initial Assessment Questions  1. BLOOD PRESSURE: \"What is the blood pressure? \" \"Did you take at least two measurements 5 minutes apart?\"  180/104 before taking any RX medications this am.    2. ONSET: \"When did you take your blood pressure? \"   Woke this am with elevated BP  3. HOW: \"How did you obtain the blood pressure? \" (e.g., visiting nurse, automatic home BP monitor)  LEFT arm automatic cuff. 4. HISTORY: \"Do you have a history of high blood pressure? \"  HTN hx, and wife reports recent CVA in January 2021. Seen in ER 2 nights ago for similar complaint. 5. MEDICATIONS: [place] Gram you taking any medications for blood pressure? \" \"Have you missed any doses recently? \"      NO,     6. OTHER SYMPTOMS: \"Do you have any symptoms? \" (e.g., headache, chest pain, blurred vision, difficulty breathing, weakness)    Headache, dizziness    7. PREGNANCY: \"Is there any chance you are pregnant? \" \"When was your last menstrual period? \"     NA    Protocols used: HIGH BLOOD PRESSURE-ADULT-[place]

## 2021-03-01 ENCOUNTER — TELEPHONE (OUTPATIENT)
Dept: FAMILY MEDICINE CLINIC | Age: 81
End: 2021-03-01

## 2021-03-01 NOTE — TELEPHONE ENCOUNTER
Pt is cardiologist today and [place] [address] 237/105 did not give any cause. They upped medication. Hydralazine 25 mg tid less than 462 systolic and Hydralazine 50 tid more 940 systolic. Pt's wife is wondering if you would want to see pt sooner than April appt. Please advise.

## 2021-03-01 NOTE — TELEPHONE ENCOUNTER
Please help patient schedule visit in the next 2-4 days for HTN management. Please obtain [facility] records to review so I can see them prior to his visit.

## 2021-03-03 NOTE — TELEPHONE ENCOUNTER
First attempt to reach patient. Called patient @ [phone] and left message on machine for patient to return call during normal business hours of 8:30 AM and 5 PM @ [phone] option 2.

## 2021-03-15 ENCOUNTER — VIRTUAL VISIT (OUTPATIENT)
Dept: FAMILY MEDICINE CLINIC | Age: 81
End: 2021-03-15
Payer: MEDICARE

## 2021-03-15 DIAGNOSIS — Z86.73 HISTORY OF TIA (TRANSIENT ISCHEMIC ATTACK): Chronic | ICD-10-CM

## 2021-03-15 DIAGNOSIS — D47.2 MGUS (MONOCLONAL GAMMOPATHY OF UNKNOWN SIGNIFICANCE): Chronic | ICD-10-CM

## 2021-03-15 DIAGNOSIS — I50.9 CHRONIC CONGESTIVE HEART FAILURE, UNSPECIFIED HEART FAILURE TYPE (HCC): Chronic | ICD-10-CM

## 2021-03-15 DIAGNOSIS — D72.810 LYMPHOPENIA: ICD-10-CM

## 2021-03-15 DIAGNOSIS — R25.1 TREMOR: ICD-10-CM

## 2021-03-15 DIAGNOSIS — I10 ESSENTIAL HYPERTENSION: Primary | Chronic | ICD-10-CM

## 2021-03-15 DIAGNOSIS — I48.0 PAROXYSMAL ATRIAL FIBRILLATION (HCC): Chronic | ICD-10-CM

## 2021-03-15 DIAGNOSIS — D75.89 MACROCYTOSIS: ICD-10-CM

## 2021-03-15 DIAGNOSIS — M48.02 CERVICAL STENOSIS OF SPINAL CANAL: Chronic | ICD-10-CM

## 2021-03-15 DIAGNOSIS — R53.1 WEAKNESS OF LEFT SIDE OF BODY: ICD-10-CM

## 2021-03-15 DIAGNOSIS — R20.2 PARESTHESIA OF LEFT ARM AND LEG: ICD-10-CM

## 2021-03-15 PROCEDURE — 99215 OFFICE O/P EST HI 40 MIN: CPT | Performed by: FAMILY MEDICINE

## 2021-03-15 RX ORDER — HYDRALAZINE HYDROCHLORIDE 25 MG/1
TABLET, FILM COATED ORAL
COMMUNITY
Start: 2021-02-25 | End: 2021-04-12

## 2021-03-15 RX ORDER — MEMANTINE HYDROCHLORIDE 5 MG/1
5 TABLET ORAL 2 TIMES DAILY
COMMUNITY
Start: 2021-03-08 | End: 2021-10-21

## 2021-03-15 RX ORDER — DICYCLOMINE HYDROCHLORIDE 10 MG/1
CAPSULE ORAL
COMMUNITY
Start: 2021-03-11 | End: 2021-07-12 | Stop reason: ALTCHOICE

## 2021-03-15 RX ORDER — HYDRALAZINE HYDROCHLORIDE 50 MG/1
50 TABLET, FILM COATED ORAL 3 TIMES DAILY
COMMUNITY
Start: 2021-02-24 | End: 2021-04-12

## 2021-03-15 ASSESSMENT — ENCOUNTER SYMPTOMS
SHORTNESS OF BREATH: 0
COUGH: 0
NAUSEA: 0
DIARRHEA: 0
VOMITING: 0
BLOOD IN STOOL: 0
ABDOMINAL PAIN: 0
ANAL BLEEDING: 0
CONSTIPATION: 0
WHEEZING: 0
BACK PAIN: 1
CHEST TIGHTNESS: 0

## 2021-03-15 NOTE — PROGRESS NOTES
Álvaro Sanchez (:  1940) is a 78 y.o. male,Established patient, here for evaluation of the following chief complaint(s): Hypertension (Patient wife reports patient being in and out of hospital over the last couple of months. Pt has had trouble controlling BP. )      ASSESSMENT/PLAN:  1. Essential hypertension  Assessment & Plan:  Blood pressure improved with restarting hydralazine at increased dose since most recent visit. Patient has follow-up in place with cardiology office and has a visit scheduled with nephrology to discuss potential further management as well. Patient and wife were instructed to go to the emergency room for any elevated blood pressure readings associated with chest pain, dyspnea, palpitations, worst headache of life, vision changes, acute onset muscle weakness or change in mental status. 2. Paresthesia of left arm and leg  Comments:  Evaluation underway per neurology with EEG and CTA head pending in the next several days. Patient instructed to go to ER for any worsening symptoms. 3. Weakness of left side of body  Comments:  Evaluation underway per neurology with EEG and CTA head pending in the next several days. Patient instructed to go to ER for any worsening symptoms. 4. History of TIA (transient ischemic attack)  5. Paroxysmal atrial fibrillation (HCC)  Assessment & Plan:  Stable. Currently asymptomatic and historically well controlled. He remains on anticoagulation without signs of bleeding. Continue current management. Orders:  -     Handicap Placard MISC; Starting Mon 3/15/2021, Disp-1 each, R-0, PrintDX: CHF (I50.9), Atrial Fibrillation (I48.0), Spinal stenosis (M48.02), Tremor (R25.1)      EXPIRES: 2026  6. Chronic congestive heart failure, unspecified heart failure type [facility])  Assessment & Plan:  Stable. No reported worsening lower extremity edema, orthopnea, or PND. We will continue to monitor symptomatically over time.   Orders:  -     Handicap Placard MISC; Starting Mon 3/15/2021, Disp-1 each, R-0, PrintDX: CHF (I50.9), Atrial Fibrillation (I48.0), Spinal stenosis (M48.02), Tremor (R25.1)      EXPIRES: 03/2026  7. MGUS (monoclonal gammopathy of unknown significance)  Assessment & Plan:  Stable. Patient established with hematology/oncology for long-term lab work monitoring and management. 8. Macrocytosis  Assessment & Plan:  Established with hematology/oncology for long-term follow-up and management  9. Lymphopenia  Assessment & Plan:  Established with hematology/oncology for long-term follow-up and management  10. Tremor  -     Handicap Placard MISC; Starting Mon 3/15/2021, Disp-1 each, R-0, PrintDX: CHF (I50.9), Atrial Fibrillation (I48.0), Spinal stenosis (M48.02), Tremor (R25.1)      EXPIRES: 03/2026  11. Cervical stenosis of spinal canal  -     Handicap Placard MISC; Starting Mon 3/15/2021, Disp-1 each, R-0, PrintDX: CHF (I50.9), Atrial Fibrillation (I48.0), Spinal stenosis (M48.02), Tremor (R25.1)      EXPIRES: 03/2026      Return for KEEP NEXT SCHEDULED VISIT APRIL 2021 - CHANGE TO IN PERSON VISIT. SUBJECTIVE/OBJECTIVE:  HPI    Patient presents for virtual video visit for hospital follow-up. He was seen at [facility] with persistently elevated blood pressure values in the 857J to 755M systolic and 11A to 059C diastolic. Due to known past history of TIA, patient and wife were concerned about these values and presented to the emergency room. Presenting vitals showed /107, otherwise unremarkable. He was given clonidine in the ER with improved BP down to 168/87. Exam in the emergency room was reported as unremarkable other than noted chronic tremor. He was found stable for discharge home with a prescription for clonidine to be used PRN for elevated BP values and was instructed to F/U with PCP and cardiology. Since ER discharge patient has discussed blood pressure medication with cardiology office over the phone.   He was instructed to restart hydralazine which was previously discontinued, and increase his dose from previous 50 mg dose to 75 mg 3 times daily with eventual goal of increasing medication to 100 mg TID. On 75 mg TID dosing currently, patient reports home BP values improved, now usually in 676S systolic and 61T diastolic. Patient denies any chest pain, dyspnea, palpitations, lightheadedness, dizziness, worsening lower extremity edema, or syncope. There is no reported vision change, persistent worsening headache, or new onset muscle weakness. Patient reports worsening baseline tremor of the upper extremities and head. There are also left upper and lower extremity paresthesias with tingling pain and subjective weakness for which evaluation is ongoing per neurology (Dr. Antoinette Goodman @ [facility]). He has been seen by the specialist since his ER discharge and further testing is pending with CTA of the head and EEG. Patient and wife report having a visit scheduled with nephrology (Dr. Deep Teixeira in [facility]) next week to discuss further blood pressure medication adjustments per the recommendation of their daughter who is a nurse. They also have follow-up visit scheduled with cardiology (Dr. Lori Arango) in the next 3 weeks for blood pressure reassessment. Since most recent visit patient has been established with hematology/oncology (Dr. Brennan Mills @ [facility]) for reported fever of unknown origin and sweats. Labwork has shown macrocytosis with lymphopenia, and MGUS. Recommendation was made to follow testing over time. Review of Systems   Constitutional: Positive for fever (intermittent, in the evening). Negative for appetite change, chills, diaphoresis, fatigue and unexpected weight change. Eyes: Negative for visual disturbance. Respiratory: Negative for cough, chest tightness, shortness of breath and wheezing. Cardiovascular: Negative for chest pain, palpitations and leg swelling.         No orthopnea, No PND   Gastrointestinal: Negative for abdominal pain, anal bleeding, blood in stool, constipation, diarrhea, nausea and vomiting. No heartburn, No melena   Endocrine: Negative for cold intolerance, heat intolerance, polydipsia, polyphagia and polyuria. Genitourinary: Negative for dysuria and hematuria. Musculoskeletal: Positive for back pain, myalgias and neck pain. Skin: Negative for rash. Neurological: Positive for tremors, weakness (intermittent, left-sided), numbness (intermittent, left-sided) and headaches (intermittent). Negative for dizziness, seizures, syncope, facial asymmetry, speech difficulty and light-headedness. No flowsheet data found.      Physical Exam    [INSTRUCTIONS:  \"[x]\" Indicates a positive item  \"[]\" Indicates a negative item  -- DELETE ALL ITEMS NOT EXAMINED]    Constitutional: [x] Appears well-developed and well-nourished [x] No apparent distress      [] Abnormal -     Mental status: [x] Alert and awake  [x] Oriented to person/place/time [x] Able to follow commands    [] Abnormal -     Eyes:   EOM    [x]  Normal    [] Abnormal -   Sclera  [x]  Normal    [] Abnormal -          Discharge [x]  None visible   [] Abnormal -     HENT: [x] Normocephalic, atraumatic  [] Abnormal -   [x] Mouth/Throat: Mucous membranes are moist    External Ears [x] Normal  [] Abnormal -    Neck: [x] No visualized mass [] Abnormal -     Pulmonary/Chest: [x] Respiratory effort normal   [x] No visualized signs of difficulty breathing or respiratory distress        [] Abnormal -      Musculoskeletal:   [] Normal gait with no signs of ataxia         [] Normal range of motion of neck        [] Abnormal -     Neurological:        [x] No Facial Asymmetry (Cranial nerve 7 motor function) (limited exam due to video visit)          [x] No gaze palsy        [x] Abnormal -   Tremors noted to head and upper extremities        Skin:        [x] No significant exanthematous lesions or discoloration noted on facial skin         [] Abnormal - Psychiatric:       [x] Normal Affect [] Abnormal -        [x] No Hallucinations    Other pertinent observable physical exam findings:-          On this date 3/15/2021 I have spent 65 minutes reviewing previous notes, test results and face to face (virtual) with the patient discussing the diagnosis and importance of compliance with the treatment plan as well as documenting on the day of the visit. Wilmar Sanchez, was evaluated through a synchronous (real-time) audio-video encounter. The patient (or guardian if applicable) is aware that this is a billable service. Verbal consent to proceed has been obtained within the past 12 months. The visit was conducted pursuant to the emergency declaration under the [facility], [address] [place] [address] authority and the [employer] and [place] [employer] Act. Patient identification was verified, and a caregiver was present when appropriate. The patient was located in a state where the provider was credentialed to provide care. An electronic signature was used to authenticate this note.     --James Gifford MD

## 2021-03-16 ENCOUNTER — TELEPHONE (OUTPATIENT)
Dept: FAMILY MEDICINE CLINIC | Age: 81
End: 2021-03-16

## 2021-03-16 NOTE — TELEPHONE ENCOUNTER
Patient wife came in and dropped off his FMLA forms I have scanned them in media and put the originals on your desk. cp

## 2021-04-12 ENCOUNTER — OFFICE VISIT (OUTPATIENT)
Dept: FAMILY MEDICINE CLINIC | Age: 81
End: 2021-04-12
Payer: MEDICARE

## 2021-04-12 VITALS
BODY MASS INDEX: 27.59 KG/M2 | OXYGEN SATURATION: 94 % | WEIGHT: 175.8 LBS | HEIGHT: 67 IN | DIASTOLIC BLOOD PRESSURE: 70 MMHG | TEMPERATURE: 96.4 F | RESPIRATION RATE: 18 BRPM | SYSTOLIC BLOOD PRESSURE: 110 MMHG | HEART RATE: 55 BPM

## 2021-04-12 DIAGNOSIS — K59.00 CONSTIPATION, UNSPECIFIED CONSTIPATION TYPE: ICD-10-CM

## 2021-04-12 DIAGNOSIS — J45.30 MILD PERSISTENT ASTHMA WITHOUT COMPLICATION: Chronic | ICD-10-CM

## 2021-04-12 DIAGNOSIS — Z91.81 AT HIGH RISK FOR FALLS: ICD-10-CM

## 2021-04-12 DIAGNOSIS — K51.90 ULCERATIVE COLITIS WITHOUT COMPLICATIONS, UNSPECIFIED LOCATION (HCC): Chronic | ICD-10-CM

## 2021-04-12 DIAGNOSIS — I50.9 CHRONIC CONGESTIVE HEART FAILURE, UNSPECIFIED HEART FAILURE TYPE (HCC): Chronic | ICD-10-CM

## 2021-04-12 DIAGNOSIS — I48.0 PAROXYSMAL ATRIAL FIBRILLATION (HCC): Chronic | ICD-10-CM

## 2021-04-12 DIAGNOSIS — J31.0 RHINITIS, UNSPECIFIED TYPE: ICD-10-CM

## 2021-04-12 DIAGNOSIS — I25.10 CORONARY ARTERY DISEASE INVOLVING NATIVE CORONARY ARTERY OF NATIVE HEART WITHOUT ANGINA PECTORIS: Chronic | ICD-10-CM

## 2021-04-12 DIAGNOSIS — I10 ESSENTIAL HYPERTENSION: Primary | Chronic | ICD-10-CM

## 2021-04-12 PROCEDURE — 99214 OFFICE O/P EST MOD 30 MIN: CPT | Performed by: FAMILY MEDICINE

## 2021-04-12 RX ORDER — NIFEDIPINE 30 MG/1
TABLET, FILM COATED, EXTENDED RELEASE ORAL
COMMUNITY
Start: 2021-03-23

## 2021-04-12 RX ORDER — PSEUDOEPHEDRINE HCL 30 MG
TABLET ORAL
COMMUNITY

## 2021-04-12 RX ORDER — IPRATROPIUM BROMIDE 42 UG/1
2 SPRAY, METERED NASAL 3 TIMES DAILY
Qty: 1 BOTTLE | Refills: 1 | Status: SHIPPED | OUTPATIENT
Start: 2021-04-12 | End: 2021-10-21

## 2021-04-12 ASSESSMENT — ENCOUNTER SYMPTOMS
NAUSEA: 0
BLOOD IN STOOL: 0
TROUBLE SWALLOWING: 0
SINUS PAIN: 0
CONSTIPATION: 1
ANAL BLEEDING: 0
SORE THROAT: 0
VOMITING: 0
WHEEZING: 0
SHORTNESS OF BREATH: 0
RHINORRHEA: 1
COUGH: 0
ABDOMINAL PAIN: 0
DIARRHEA: 0
CHEST TIGHTNESS: 0

## 2021-04-12 NOTE — PROGRESS NOTES
SUBJECTIVE/OBJECTIVE:    HPI    Patient presents for chronic hypertension, hyperlipidemia, A. fib, and heart failure follow-up visit. Cardiology and nephrology follow-up visits are in place for continued help with blood pressure management. Neurology management remains in place for left upper and lower extremity paresthesias with pain and subjective weakness. Patient has now been established with pain management and is scheduled for cervical spine injections for pain control with potential follow-up nerve ablation. 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 Patient and wife report blood pressure values within normal limits since most recent visit following adjustments to medication recommended by the specialists. Values usually range from 966Q to 641N systolic and 56A to 82M diastolic. Initially hydralazine was discontinued and losartan 50 mg daily and Nifedipine 60 mg daily. Subsequently losartan was discontinued due to worsening renal function and Nifedipine was increased to 90 mg daily. Patient denies any headaches, vision changes, chest pain, dyspnea, palpitations, lightheadedness, dizziness, worsening lower extremity edema, or syncope. Patient denies any dyspnea at rest, persistent wheezing, worsening cough, chest tightness, or limitation in normal day-to-day activity due to breathing.     Current Outpatient Medications on File Prior to Visit   Medication Sig Dispense Refill    NIFEdipine (ADALAT CC) 30 MG extended release tablet take 3 tablets by mouth daily      docusate (COLACE, DULCOLAX) 100 MG CAPS Take by mouth      Aspirin Buf,CaCarb-MgCarb-MgO, 81 MG TABS Take by mouth      dicyclomine (BENTYL) 10 MG capsule take 1 capsule by mouth four times a day before meals and every evening      memantine (NAMENDA) 5 MG tablet Take 5 mg by mouth 2 times daily      Handicap Placard MISC by Does not apply route DX: CHF (I50.9), Atrial Fibrillation (I48.0), Spinal stenosis (M48.02), Tremor (R25.1)      EXPIRES: 03/2026 1 each 0    atorvastatin (LIPITOR) 10 MG tablet Take 1 tablet by mouth daily 90 tablet 1    donepezil (ARICEPT) 10 MG tablet take 1 tablet by mouth twice a day (Patient taking differently: Take 10 mg by mouth daily ) 180 tablet 1    traMADol (ULTRAM) 50 MG tablet Take 50 mg by mouth as needed.  potassium chloride (KLOR-CON M) 20 MEQ extended release tablet Take 1 tablet by mouth daily 90 tablet 1    propranolol (INDERAL LA) 80 MG extended release capsule Take 1 capsule by mouth daily 90 capsule 1    primidone (MYSOLINE) 50 MG tablet Take 1 tablet by mouth 3 times daily 90 tablet 1    Cholecalciferol (VITAMIN D3) 50 MCG (2000 UT) CAPS Take 1 capsule by mouth daily 90 capsule 3    DULERA 200-5 MCG/ACT inhaler   0    azelastine (ASTELIN) 0.1 % nasal spray 1 spray by Nasal route 2 times daily Use in each nostril as directed      mirabegron (MYRBETRIQ) 25 MG TB24 Take 25 mg by mouth      Magnesium 400 MG TABS Take by mouth      albuterol sulfate HFA (PROAIR HFA) 108 (90 Base) MCG/ACT inhaler 2 puffs by Other route      ELIQUIS 5 MG TABS tablet 5 mg 2 times daily       torsemide (DEMADEX) 20 MG tablet Take 20 mg by mouth daily bid  1     No current facility-administered medications on file prior to visit. Allergies   Allergen Reactions    Codeine      nausea    Morphine Other (See Comments)     Confused and disoriented    Norco [Hydrocodone-Acetaminophen] Other (See Comments)     Confusion    Singulair [Montelukast] Hives        Review of Systems   Constitutional: Negative for appetite change, chills, diaphoresis, fatigue, fever and unexpected weight change. HENT: Positive for congestion, postnasal drip and rhinorrhea. Negative for ear pain, sinus pain, sore throat and trouble swallowing. Eyes: Negative for visual disturbance. Respiratory: Negative for cough, chest tightness, shortness of breath and wheezing.     Cardiovascular: Negative for chest pain, palpitations and leg swelling. No orthopnea, No PND   Gastrointestinal: Positive for constipation (hard BM with increased straining every 3-4 days). Negative for abdominal pain, anal bleeding, blood in stool, diarrhea, nausea and vomiting. No heartburn, No melena   Endocrine: Negative for cold intolerance, heat intolerance, polydipsia, polyphagia and polyuria. Genitourinary: Negative for dysuria and hematuria. Musculoskeletal: Negative for myalgias. Skin: Negative for rash. Neurological: Positive for tremors (chronic, head), weakness (left upper and lower extremities) and numbness (left upper and lower extremities). Negative for dizziness, syncope, light-headedness and headaches. Psychiatric/Behavioral: Negative for dysphoric mood. The patient is not nervous/anxious. Vitals:  /70 (Site: Left Upper Arm, Position: Sitting, Cuff Size: Medium Adult)   Pulse 55   Temp 96.4 °F (35.8 °C) (Temporal)   Resp 18   Ht 5' 7\" (1.702 m)   Wt 175 lb 12.8 oz (79.7 kg)   SpO2 94%   BMI 27.53 kg/m²     Physical Exam  Vitals signs reviewed. Constitutional:       General: He is not in acute distress. Appearance: He is well-developed. Neck:      Musculoskeletal: Neck supple. Thyroid: No thyromegaly. Vascular: No carotid bruit. Cardiovascular:      Rate and Rhythm: Normal rate. Rhythm irregularly irregular. Heart sounds: No murmur. Pulmonary:      Effort: Pulmonary effort is normal. No respiratory distress. Breath sounds: Normal breath sounds. No wheezing. Abdominal:      General: Bowel sounds are normal. There is no distension. Palpations: Abdomen is soft. Tenderness: There is no abdominal tenderness. There is no right CVA tenderness, left CVA tenderness, guarding or rebound. Musculoskeletal: Normal range of motion. Right lower leg: No edema. Left lower leg: No edema.    Lymphadenopathy:      Cervical: No cervical adenopathy. Skin:     General: Skin is warm. Findings: No rash. Neurological:      Mental Status: He is alert and oriented to person, place, and time. Psychiatric:         Mood and Affect: Mood normal.         Behavior: Behavior normal.         Ortho Exam (If Applicable)              An electronic signature was used to authenticate this note.      Naresh Luz MD

## 2021-04-12 NOTE — ASSESSMENT & PLAN NOTE
Blood pressure now within normal limits following adjustments per nephrology office. Patient will continue with current medication regimen and we will continue to follow over time.

## 2021-04-12 NOTE — PATIENT INSTRUCTIONS
For conservative constipation management:  1. Increase water intake to 48-64 oz daily  2. Eat a high fiber diet (fruits, vegetables, whole grains)  3. Start a daily over-the-counter fiber supplement (ex: Metamucil, Konsyl, Benefiber, Citrucel, FiberCon)   4. Take a daily over-the-counter stool softener (ex: Colace, Docusate)  5. Take a daily probiotic over-the-counter for 4 weeks (ex: Align, Culturelle, [employer])  6. After 3-4 days without a bowel movement despite the above measures, try Miralax over-the-counter  Patient Education        Preventing Falls: Care Instructions  Your Care Instructions     Getting around your home safely can be a challenge if you have injuries or health problems that make it easy for you to fall. Loose rugs and furniture in walkways are among the dangers for many older people who have problems walking or who have poor eyesight. People who have conditions such as arthritis, osteoporosis, or dementia also have to be careful not to fall. You can make your home safer with a few simple measures. Follow-up care is a key part of your treatment and safety. Be sure to make and go to all appointments, and call your doctor if you are having problems. It's also a good idea to know your test results and keep a list of the medicines you take. How can you care for yourself at home? Taking care of yourself  · You may get dizzy if you do not drink enough water. To prevent dehydration, drink plenty of fluids. Choose water and other caffeine-free clear liquids. If you have kidney, heart, or liver disease and have to limit fluids, talk with your doctor before you increase the amount of fluids you drink. · Exercise regularly to improve your strength, muscle tone, and balance. Walk if you can. Swimming may be a good choice if you cannot walk easily. · Have your vision and hearing checked each year or any time you notice a change.  If you have trouble seeing and hearing, you might not be able to avoid objects and could lose your balance. · Know the side effects of the medicines you take. Ask your doctor or pharmacist whether the medicines you take can affect your balance. Sleeping pills or sedatives can affect your balance. · Limit the amount of alcohol you drink. Alcohol can impair your balance and other senses. · Ask your doctor whether calluses or corns on your feet need to be removed. If you wear loose-fitting shoes because of calluses or corns, you can lose your balance and fall. · Talk to your doctor if you have numbness in your feet. Preventing falls at home  · Remove raised doorway thresholds, throw rugs, and clutter. Repair loose carpet or raised areas in the floor. · Move furniture and electrical cords to keep them out of walking paths. · Use nonskid floor wax, and wipe up spills right away, especially on ceramic tile floors. · If you use a walker or cane, put rubber tips on it. If you use crutches, clean the bottoms of them regularly with an abrasive pad, such as steel wool. · Keep your house well lit, especially Yadiel Riggs, and outside walkways. Use night-lights in areas such as hallways and bathrooms. Add extra light switches or use remote switches (such as switches that go on or off when you clap your hands) to make it easier to turn lights on if you have to get up during the night. · Install sturdy handrails on stairways. · Move items in your cabinets so that the things you use a lot are on the lower shelves (about waist level). · Keep a cordless phone and a flashlight with new batteries by your bed. If possible, put a phone in each of the main rooms of your house, or carry a cell phone in case you fall and cannot reach a phone. Or, you can wear a device around your neck or wrist. You push a button that sends a signal for help. · Wear low-heeled shoes that fit well and give your feet good support. Use footwear with nonskid soles.  Check the heels and soles of your shoes for wear. Repair or replace worn heels or soles. · Do not wear socks without shoes on wood floors. · Walk on the grass when the sidewalks are slippery. If you live in an area that gets snow and ice in the winter, sprinkle salt on slippery steps and sidewalks. Preventing falls in the bath  · Install grab bars and nonskid mats inside and outside your shower or tub and near the toilet and sinks. · Use shower chairs and bath benches. · Use a hand-held shower head that will allow you to sit while showering. · Get into a tub or shower by putting the weaker leg in first. Get out of a tub or shower with your strong side first.  · Repair loose toilet seats and consider installing a raised toilet seat to make getting on and off the toilet easier. · Keep your bathroom door unlocked while you are in the shower. Where can you learn more? Go to https://24PageBookspePikimal.[employer] Reseach. org and sign in to your [employer] account. Enter [id number] [id number] [phone] in the [facility] box to learn more about \"Preventing Falls: Care Instructions. \"     If you do not have an account, please click on the \"Sign Up Now\" link. Current as of: December 7, 2020               Content Version: 12.8  © 2414-2549 Healthwise, Incorporated. Care instructions adapted under license by [facility] ([facility]). If you have questions about a medical condition or this instruction, always ask your healthcare professional. [place] [address] any warranty or liability for your use of this information. Patient Education        Preventing Outdoor Falls: Care Instructions  Your Care Instructions     Worries about falls don't need to keep you indoors. Outdoor activities like walking have big benefits for your health. You will need to watch your step and learn a few safety measures. If you are worried about having a fall outdoors, ask your doctor about exercises, classes, or physical therapy that may help.  You can learn ways to gain strength, flexibility, and balance. Ask if it might help to use a cane or walker. You can make your time outdoors safer with a few simple measures. Follow-up care is a key part of your treatment and safety. Be sure to make and go to all appointments, and call your doctor if you are having problems. It's also a good idea to know your test results and keep a list of the medicines you take. How can you prevent falls outdoors? · Wear shoes with firm soles and low heels. If you have to walk on an icy surface, use grippers that can be worn over your shoes in bad weather. · Be extra careful if weather is bad. Walk on the grass when the sidewalks are slick. If you live in a place that gets snow and ice in the winter, sprinkle salt on slippery stairs and sidewalks. · Be careful getting on or off buses and trains or getting in and out of cars. If handrails are available, use them. · Be careful when you cross the street. Look for crosswalks or places where curb cuts or ramps are present. · Try not to hurry, especially if you are carrying something. · Be cautious in parking lots or garages. There may be curbs or changes in pavement, or the height of the pavement may vary. · Make sure to wear the correct eyeglasses, if you need them. Reading glasses or bifocals can make it harder to see hazards that might be in your way. · If you are walking outdoors for exercise, try to:  ? Walk in well-lighted, well-maintained areas. These include high school or college tracks, shopping malls, and public spaces. ? Walk with a partner. ? Watch out for cracked sidewalks, curbs, changes in the height of the pavement, exposed tree roots, and debris such as fallen leaves or branches. Where can you learn more? Go to https://Velo Labsluz.StemSave. org and sign in to your [employer] account. Enter W902 in the [place] box to learn more about \"Preventing Outdoor Falls: Care Instructions. \"     If you do not have an account, please click on the \"Sign Up Now\" link. Current as of: December 7, 2020               Content Version: 12.8  © 2006-2021 Healthwise, [employer]. Care instructions adapted under license by [facility] ([facility]). If you have questions about a medical condition or this instruction, always ask your healthcare professional. [address] any warranty or liability for your use of this information. Patient Education        How to Get Up Safely After a Fall: Care Instructions  Your Care Instructions     If you have injuries, health problems, or other reasons that may make it easy for you to fall at home, it is a good idea to learn how to get up safely after a fall. Learning how to get up correctly can help you avoid making an injury worse. Also, knowing what to do if you cannot get up can help you stay safe until help arrives. Follow-up care is a key part of your treatment and safety. Be sure to make and go to all appointments, and call your doctor if you are having problems. It's also a good idea to know your test results and keep a list of the medicines you take. How can you care for yourself after a fall? If you think you can get up  First lie still for a few minutes and think about how you feel. If your body feels okay and you think you can get up safely, follow the rest of the steps below:  1. Look for a chair or other piece of furniture that is close to you. 2. Roll onto your side and rest. Roll by turning your head in the direction you want to roll, move your shoulder and arm, then hip and leg in the same direction. 3. Lie still for a moment to let your blood pressure adjust.  4. Slowly push your upper body up, lift your head, and take a moment to rest.  5. Slowly get up on your hands and knees, and crawl to the chair or other stable piece of furniture. 6. Put your hands on the chair. 7. Move one foot forward, and place it flat on the floor. Your other leg should be bent with the knee on the floor. always ask your healthcare professional. [place] [address] any warranty or liability for your use of this information.

## 2021-05-03 ENCOUNTER — HOSPITAL ENCOUNTER (OUTPATIENT)
Dept: LAB | Age: 81
Discharge: HOME | End: 2021-05-03
Payer: MEDICARE

## 2021-05-03 LAB
ANION GAP SERPL CALCULATED.3IONS-SCNC: 9 MEQ/L (ref 9–15)
BUN BLDV-MCNC: 31 MG/DL (ref 8–23)
CALCIUM SERPL-MCNC: 8.6 MG/DL (ref 8.5–9.9)
CHLORIDE BLD-SCNC: 100 MEQ/L (ref 95–107)
CO2: 30 MEQ/L (ref 20–31)
CREAT SERPL-MCNC: 1.69 MG/DL (ref 0.7–1.2)
GFR AFRICAN AMERICAN: 47.1
GFR NON-AFRICAN AMERICAN: 38.9
GLUCOSE BLD-MCNC: 90 MG/DL (ref 70–99)
POTASSIUM SERPL-SCNC: 4.4 MEQ/L (ref 3.4–4.9)
SODIUM BLD-SCNC: 139 MEQ/L (ref 135–144)

## 2021-05-03 PROCEDURE — 80048 BASIC METABOLIC PNL TOTAL CA: CPT

## 2021-05-03 PROCEDURE — 36415 COLL VENOUS BLD VENIPUNCTURE: CPT

## 2021-07-12 ENCOUNTER — PATIENT MESSAGE (OUTPATIENT)
Dept: FAMILY MEDICINE CLINIC | Age: 81
End: 2021-07-12

## 2021-07-12 NOTE — TELEPHONE ENCOUNTER
From: Delroy Sanchez  To: Otto Diaz MD  Sent: 7/12/2021 9:46 AM EDT  Subject: Prescription Question    This message is being sent by Shashank Lizarraga on behalf of Ed Bueno. I need a refill for Dicyclomine 10 mg. I have enough for one more week. Thanks.

## 2021-07-12 NOTE — TELEPHONE ENCOUNTER
I called patient and wife to clarify use of medication. Patient was prescribed bentyl initially by GI for lower abdominal pain thought to be related to IBS. Medication was stopped and then restarted again by patient. Currently patient has no reported abdominal pain, but there is still reported intermittent constipation. I instructed them to stop the bentyl and continue with conservative management of constipation. They will call for any return of abdominal pain. They report understanding and agree with plan.

## 2021-07-24 DIAGNOSIS — I48.0 PAROXYSMAL ATRIAL FIBRILLATION (HCC): Chronic | ICD-10-CM

## 2021-07-24 DIAGNOSIS — I10 ESSENTIAL HYPERTENSION: Chronic | ICD-10-CM

## 2021-07-26 RX ORDER — PROPRANOLOL HYDROCHLORIDE 80 MG/1
CAPSULE, EXTENDED RELEASE ORAL
Qty: 90 CAPSULE | Refills: 1 | Status: SHIPPED | OUTPATIENT
Start: 2021-07-26 | End: 2021-10-21

## 2021-08-23 DIAGNOSIS — E78.5 HYPERLIPIDEMIA, UNSPECIFIED HYPERLIPIDEMIA TYPE: Chronic | ICD-10-CM

## 2021-08-23 RX ORDER — ATORVASTATIN CALCIUM 10 MG/1
10 TABLET, FILM COATED ORAL DAILY
Qty: 90 TABLET | Refills: 1 | Status: SHIPPED | OUTPATIENT
Start: 2021-08-23

## 2021-08-23 NOTE — TELEPHONE ENCOUNTER
Patients wife is requesting medication refill.  Please approve or deny this request.    Rx requested:  Requested Prescriptions     Pending Prescriptions Disp Refills    atorvastatin (LIPITOR) 10 MG tablet 90 tablet 1     Sig: Take 1 tablet by mouth daily         Last Office Visit:   4/12/2021      Next Visit Date:  Future Appointments   Date Time Provider Iqra Dang   8/25/2021 10:40 AM Jose Barraza MD [facility]

## 2021-10-21 ENCOUNTER — OFFICE VISIT (OUTPATIENT)
Dept: FAMILY MEDICINE CLINIC | Age: 81
End: 2021-10-21
Payer: MEDICARE

## 2021-10-21 VITALS
WEIGHT: 175.8 LBS | DIASTOLIC BLOOD PRESSURE: 78 MMHG | OXYGEN SATURATION: 92 % | HEIGHT: 67 IN | BODY MASS INDEX: 27.59 KG/M2 | SYSTOLIC BLOOD PRESSURE: 112 MMHG | HEART RATE: 55 BPM | TEMPERATURE: 97.2 F | RESPIRATION RATE: 18 BRPM

## 2021-10-21 DIAGNOSIS — R41.3 MEMORY LOSS: ICD-10-CM

## 2021-10-21 DIAGNOSIS — E78.5 HYPERLIPIDEMIA, UNSPECIFIED HYPERLIPIDEMIA TYPE: Chronic | ICD-10-CM

## 2021-10-21 DIAGNOSIS — I10 PRIMARY HYPERTENSION: Primary | Chronic | ICD-10-CM

## 2021-10-21 DIAGNOSIS — I48.0 PAROXYSMAL ATRIAL FIBRILLATION (HCC): Chronic | ICD-10-CM

## 2021-10-21 DIAGNOSIS — D47.2 MGUS (MONOCLONAL GAMMOPATHY OF UNKNOWN SIGNIFICANCE): ICD-10-CM

## 2021-10-21 DIAGNOSIS — I50.9 CHRONIC CONGESTIVE HEART FAILURE, UNSPECIFIED HEART FAILURE TYPE (HCC): Chronic | ICD-10-CM

## 2021-10-21 DIAGNOSIS — Z23 NEED FOR VACCINATION: ICD-10-CM

## 2021-10-21 DIAGNOSIS — R26.81 UNSTEADY GAIT: ICD-10-CM

## 2021-10-21 DIAGNOSIS — J45.30 MILD PERSISTENT ASTHMA WITHOUT COMPLICATION: Chronic | ICD-10-CM

## 2021-10-21 DIAGNOSIS — I25.10 CORONARY ARTERY DISEASE INVOLVING NATIVE CORONARY ARTERY OF NATIVE HEART WITHOUT ANGINA PECTORIS: Chronic | ICD-10-CM

## 2021-10-21 PROBLEM — J30.0 VASOMOTOR RHINITIS: Status: ACTIVE | Noted: 2021-10-21

## 2021-10-21 PROCEDURE — 90694 VACC AIIV4 NO PRSRV 0.5ML IM: CPT | Performed by: FAMILY MEDICINE

## 2021-10-21 PROCEDURE — G0008 ADMIN INFLUENZA VIRUS VAC: HCPCS | Performed by: FAMILY MEDICINE

## 2021-10-21 PROCEDURE — 99214 OFFICE O/P EST MOD 30 MIN: CPT | Performed by: FAMILY MEDICINE

## 2021-10-21 RX ORDER — DICYCLOMINE HYDROCHLORIDE 10 MG/1
CAPSULE ORAL
COMMUNITY
End: 2021-10-21

## 2021-10-21 RX ORDER — RIVASTIGMINE 4.6 MG/24H
4.6 PATCH, EXTENDED RELEASE TRANSDERMAL DAILY
COMMUNITY
Start: 2021-08-06 | End: 2021-10-21

## 2021-10-21 RX ORDER — SPIRONOLACTONE 25 MG/1
TABLET ORAL
COMMUNITY
Start: 2021-04-20

## 2021-10-21 RX ORDER — LOSARTAN POTASSIUM 50 MG/1
TABLET ORAL
COMMUNITY
Start: 2021-03-22 | End: 2021-10-21

## 2021-10-21 RX ORDER — MEMANTINE HYDROCHLORIDE 10 MG/1
TABLET ORAL
COMMUNITY
End: 2021-10-21

## 2021-10-21 RX ORDER — ACETAMINOPHEN 500 MG
500 TABLET ORAL EVERY 6 HOURS PRN
COMMUNITY

## 2021-10-21 SDOH — ECONOMIC STABILITY: FOOD INSECURITY: WITHIN THE PAST 12 MONTHS, YOU WORRIED THAT YOUR FOOD WOULD RUN OUT BEFORE YOU GOT MONEY TO BUY MORE.: NEVER TRUE

## 2021-10-21 SDOH — ECONOMIC STABILITY: FOOD INSECURITY: WITHIN THE PAST 12 MONTHS, THE FOOD YOU BOUGHT JUST DIDN'T LAST AND YOU DIDN'T HAVE MONEY TO GET MORE.: NEVER TRUE

## 2021-10-21 ASSESSMENT — ENCOUNTER SYMPTOMS
SHORTNESS OF BREATH: 0
WHEEZING: 0
ABDOMINAL PAIN: 0
COUGH: 0
ANAL BLEEDING: 0
NAUSEA: 0
DIARRHEA: 0
VOMITING: 0
BLOOD IN STOOL: 0
CONSTIPATION: 0
CHEST TIGHTNESS: 0

## 2021-10-21 ASSESSMENT — SOCIAL DETERMINANTS OF HEALTH (SDOH): HOW HARD IS IT FOR YOU TO PAY FOR THE VERY BASICS LIKE FOOD, HOUSING, MEDICAL CARE, AND HEATING?: NOT HARD AT ALL

## 2021-10-21 NOTE — ASSESSMENT & PLAN NOTE
Medications discontinued since most recent visit due to lack of effectiveness and continued worsening. There are occasional episodes of agitation/anxiety and hallucination reported by family. No further recommendations have been made per neurology office. I reviewed with patient, wife, and daughter the benefits of establishing care with palliative care service for help with long-term management and potential further medication.   They agreed to referral and we will follow peripherally over time with palliative care service

## 2021-10-21 NOTE — PROGRESS NOTES
Vaccine Information Sheet, \"Influenza - Inactivated\"  given to Roseann Berman, or parent/legal guardian of  Roseann Berman and verbalized understanding. Patient responses:    Have you ever had a reaction to a flu vaccine? No  Are you able to eat eggs without adverse effects? Yes  Do you have any current illness? No  Have you ever had Guillian Henrietta Syndrome? No    Flu vaccine given per order. Please see immunization tab.

## 2021-10-21 NOTE — ASSESSMENT & PLAN NOTE
Stable. No reported respiratory complaints or limitation in normal day-to-day activity due to breathing.   We will continue to monitor over time

## 2021-10-21 NOTE — PROGRESS NOTES
Heriberto Sanchez (: 1940) is a 78 y.o. male, Established patient, who presents today for:    Chief Complaint   Patient presents with    Hyperlipidemia     Patient presents today for a routine chronic follow up.  Hypertension         ASSESSMENT/PLAN    1. Primary hypertension  -     CBC Auto Differential; Future  -     Comprehensive Metabolic Panel; Future  2. Paroxysmal atrial fibrillation (HCC)  -     CBC Auto Differential; Future  -     TSH without Reflex; Future  3. Chronic congestive heart failure, unspecified heart failure type (HCC)  -     CBC Auto Differential; Future  4. Hyperlipidemia, unspecified hyperlipidemia type  -     Comprehensive Metabolic Panel; Future  -     Lipid Panel; Future  5. Coronary artery disease involving native coronary artery of native heart without angina pectoris  -     CBC Auto Differential; Future  -     Lipid Panel; Future  6. Mild persistent asthma without complication  Assessment & Plan:  Stable. No reported respiratory complaints or limitation in normal day-to-day activity due to breathing. We will continue to monitor over time   7. MGUS (monoclonal gammopathy of unknown significance)  -     CBC Auto Differential; Future  8. Memory loss  Assessment & Plan:  Medications discontinued since most recent visit due to lack of effectiveness and continued worsening. There are occasional episodes of agitation/anxiety and hallucination reported by family. No further recommendations have been made per neurology office. I reviewed with patient, wife, and daughter the benefits of establishing care with palliative care service for help with long-term management and potential further medication. They agreed to referral and we will follow peripherally over time with palliative care service  Orders:  -     Ambulatory referral to Palliative Care  9. Unsteady gait  Comments:  Patient declining home physical therapy or prescription for walker today in the office.   Patient and family advised to call office should he change his mind  Orders:  -     Ambulatory referral to Palliative Care  10. Need for vaccination  -     INFLUENZA, QUADV, ADJUVANTED, 72 YRS =, IM, PF, PREFILL SYR, 0.5ML (FLUAD)      Return in about 6 months (around 4/21/2022) for Annual Wellness Visit. SUBJECTIVE/OBJECTIVE:    HPI    Patient presents accompanied by wife and daughter for chronic hypertension, hyperlipidemia, atrial fibrillation, heart failure, and asthma follow-up visit. Cardiology and nephrology follow-ups are in place for continued help with blood pressure management. Medication adjustments have been made since most recent visit. Patient denies any headaches, vision changes, chest pain, dyspnea, palpitations, lightheadedness, dizziness, worsening lower extremity edema, or syncope. Patient denies any dyspnea at rest, persistent wheezing, worsening cough, chest tightness, or limitation in normal day-to-day activity due to breathing. Neurology management remains in placed for left upper and lower extremity paresthesias with pain and subjective weakness along with memory loss. Aricept and Namenda have been discontinued due to lack of effectiveness. Daughter and wife report concern regarding continued memory loss and declining function for patient at home. There are intermittent episodes of agitation with hallucinations making day-to-day care more difficult for wife. Patient and wife live alone, wife is doing meal preparation, housekeeping, and laundry along with caring for patient throughout the day. They have help from a son who lives next door and sees them daily, and help from a daughter who lives in [place] and usually sees them frequently on the weekend. Family is helping with shopping, transportation, and yardwork. Patient is established with pain management and status post cervical spine injections for pain control.   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 Current Outpatient Medications on File Prior to Visit   Medication Sig Dispense Refill    propranolol (INDERAL XL) 80 MG extended release capsule Take by mouth      spironolactone (ALDACTONE) 25 MG tablet Take by mouth      ASPIRIN 81 PO Take by mouth      acetaminophen (TYLENOL) 500 MG tablet Take 500 mg by mouth every 6 hours as needed for Pain      atorvastatin (LIPITOR) 10 MG tablet Take 1 tablet by mouth daily 90 tablet 1    NIFEdipine (ADALAT CC) 30 MG extended release tablet take 3 tablets by mouth daily      docusate (COLACE, DULCOLAX) 100 MG CAPS Take by mouth      Handicap Placard MISC by Does not apply route DX: CHF (I50.9), Atrial Fibrillation (I48.0), Spinal stenosis (M48.02), Tremor (R25.1)      EXPIRES: 03/2026 1 each 0    traMADol (ULTRAM) 50 MG tablet Take 50 mg by mouth as needed.  primidone (MYSOLINE) 50 MG tablet Take 1 tablet by mouth 3 times daily 90 tablet 1    Cholecalciferol (VITAMIN D3) 50 MCG (2000 UT) CAPS Take 1 capsule by mouth daily 90 capsule 3    DULERA 200-5 MCG/ACT inhaler   0    Magnesium 400 MG TABS Take by mouth      albuterol sulfate HFA (PROAIR HFA) 108 (90 Base) MCG/ACT inhaler 2 puffs by Other route      ELIQUIS 5 MG TABS tablet 5 mg 2 times daily       torsemide (DEMADEX) 20 MG tablet Take 20 mg by mouth daily bid  1     No current facility-administered medications on file prior to visit. Allergies   Allergen Reactions    Codeine      nausea    Morphine Other (See Comments)     Confused and disoriented    Norco [Hydrocodone-Acetaminophen] Other (See Comments)     Confusion    Singulair [Montelukast] Hives        Review of Systems   Constitutional: Negative for appetite change, chills, diaphoresis, fatigue, fever and unexpected weight change. Eyes: Negative for visual disturbance. Respiratory: Negative for cough, chest tightness, shortness of breath and wheezing.     Cardiovascular: Negative for chest pain, adenopathy. Skin:     Findings: No rash. Neurological:      Mental Status: He is alert. Motor: Tremor present. Gait: Gait abnormal (unsteady). Psychiatric:         Mood and Affect: Mood normal.         Behavior: Behavior normal.         Thought Content: Thought content normal.         Ortho Exam (If Applicable)              An electronic signature was used to authenticate this note.      Katlin Ramos MD

## 2021-11-03 ENCOUNTER — OFFICE VISIT (OUTPATIENT)
Dept: PALLATIVE CARE | Age: 81
End: 2021-11-03
Payer: MEDICARE

## 2021-11-03 VITALS
DIASTOLIC BLOOD PRESSURE: 74 MMHG | RESPIRATION RATE: 20 BRPM | HEART RATE: 68 BPM | SYSTOLIC BLOOD PRESSURE: 122 MMHG | OXYGEN SATURATION: 98 %

## 2021-11-03 DIAGNOSIS — Z51.5 ENCOUNTER FOR PALLIATIVE CARE: ICD-10-CM

## 2021-11-03 DIAGNOSIS — I50.9 CHRONIC CONGESTIVE HEART FAILURE, UNSPECIFIED HEART FAILURE TYPE (HCC): ICD-10-CM

## 2021-11-03 DIAGNOSIS — R60.9 EDEMA, UNSPECIFIED TYPE: ICD-10-CM

## 2021-11-03 DIAGNOSIS — J45.30 MILD PERSISTENT ASTHMA WITHOUT COMPLICATION: ICD-10-CM

## 2021-11-03 DIAGNOSIS — M15.9 PRIMARY OSTEOARTHRITIS INVOLVING MULTIPLE JOINTS: ICD-10-CM

## 2021-11-03 DIAGNOSIS — K59.00 CONSTIPATION, UNSPECIFIED CONSTIPATION TYPE: ICD-10-CM

## 2021-11-03 DIAGNOSIS — R41.3 MEMORY LOSS: ICD-10-CM

## 2021-11-03 DIAGNOSIS — F03.918 CHRONIC DEMENTIA WITH BEHAVIORAL DISTURBANCE: Primary | ICD-10-CM

## 2021-11-03 DIAGNOSIS — R06.02 SOB (SHORTNESS OF BREATH): ICD-10-CM

## 2021-11-03 PROCEDURE — 99204 OFFICE O/P NEW MOD 45 MIN: CPT | Performed by: FAMILY MEDICINE

## 2021-11-03 PROCEDURE — 99497 ADVNCD CARE PLAN 30 MIN: CPT | Performed by: FAMILY MEDICINE

## 2021-11-03 RX ORDER — QUETIAPINE FUMARATE 25 MG/1
TABLET, FILM COATED ORAL
Qty: 60 TABLET | Refills: 3 | Status: SHIPPED | OUTPATIENT
Start: 2021-11-03 | End: 2021-11-19 | Stop reason: SDUPTHER

## 2021-11-03 RX ORDER — TRAMADOL HYDROCHLORIDE 50 MG/1
50 TABLET ORAL EVERY 8 HOURS PRN
Qty: 90 TABLET | Refills: 0 | Status: SHIPPED | OUTPATIENT
Start: 2021-11-03 | End: 2021-12-03

## 2021-11-03 ASSESSMENT — ENCOUNTER SYMPTOMS
DIARRHEA: 0
WHEEZING: 0
VOMITING: 0
COUGH: 0
CONSTIPATION: 0
NAUSEA: 0
ABDOMINAL DISTENTION: 0
SHORTNESS OF BREATH: 1
EYES NEGATIVE: 1

## 2021-11-03 NOTE — PROGRESS NOTES
Subjective:      Patient Id: Seen Nadine Murdock at [facility], for initial palliative medicine consult for symptom management, advance care planning and goals of care discussion. He was accompanied to the appointment by: his wife and daughter. Chief Complaint   Patient presents with    Pain    Shortness of Breath    Follow-up    Anxiety     agitation, forgetfullness    Other      HPI       Syl Maynard is a 78 y.o. male referred to palliative care by Jmi Hilton for symptom management, advance care planning, and goals of care conversation. Nadine Murdock has complex medical history that includes CHF, Mild persistant asthma, OA, and chronic dementia with behavioral disturbances. Patient is very quite and withdrawn at visit. Wife and daughter state he has been having increase in dementia symptomology including anxiety, agitation, and hallucinations for past 2 months. He often fights and argues with them making it hard to care for him. Also, has frequent anxiety and/or withdrawness upon leaving home. Recently saw PCP with aricept and namenda being discontinued without improvement. Patient frequently wakes up and attempts to walk at  that has led to falls in past. Advised strongly to have care taker in hse at . States pain is well controlled on current regime. Rates the pain at a Pain Score:   4 on a scale of 0 to 10. States it occurs in his bilateral shoulders. Describes pain as an intermittent ache. Currently taking tylenol PRN and ultram when pain severe. Denies Sedation, constipation, or other adverse effects on current regime. He has had a PET scan on 7/19/21 at [facility] noting right lower and left upper lobe nodules with uptake suspicious for neoplasm though declined biopsy and further treatement as does not wish to pursue chemotherapy, radiation, or treatment if cancer. CT head on 8/5/21 at [facility] negative for lesions. Patient complains of occasional abd pain. States having BM every 2-3 days.  States stools are hard. Uses miralax when needed with relief. Denies further GI or  concerns. Denies jignesh blood in stool or urine. Per wife and daughter weight has been stable though must encourage patient to eat at times, Appetite is good when encouraged to eat. Tolerating diet. Ambulation and strength at baseline.     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    CAD (coronary artery disease)     status post stents in 2006 and 2008    Cervical stenosis of spinal canal     status post laminectomies in cervical spine    CHF (congestive heart failure) ([address] 6/6/2017    Diverticulosis     colonoscopy 9/2016    Diverticulosis of colon 6/6/2017    Fracture of rib of right side     Hernia     umbilical hernia repair    History of TIA (transient ischemic attack) 11/16/2017    HTN (hypertension) 8/26/2014    Hyperlipidemia     Hypertension     Iron deficiency anemia     Lymphopenia 3/15/2021    Macrocytosis 3/15/2021    Melanoma ([address]     right arm 2004    Memory loss 3/4/2014    MGUS (monoclonal gammopathy of unknown significance) 3/15/2021    Mild persistent asthma without complication 3/3/9471    Osteoarthritis     Paroxysmal atrial fibrillation ([address] 11/16/2017    Renal insufficiency     Status post total knee replacement 1/4/2012    Thoracic spine pain     chronic    TIA (transient ischemic attack) 1/16/2017    Tremor     chronic    Ulcerative colitis ([address] 1/20/2017     Past Surgical History:   Procedure Laterality Date    [facility]COPY  2005     [facility]    COLONOSCOPY  09/16/2016    polyp, sigmoid colitis, diverticulosis (DR ROSARIO)    HERNIA REPAIR Bilateral 7/1/2020    BILATERAL INGUINAL HERNIA REPAIR performed by Mu Suggs MD [facility] Left     TOTAL KNEE ARTHROPLASTY Right     TOTAL KNEE ARTHROPLASTY Left     UMBILICAL HERNIA REPAIR      VENTRAL HERNIA REPAIR  01/07/2015     Social History     Socioeconomic History    Marital status:      Spouse name: Not on file    Number of children: Not on file    Years of education: Not on file    Highest education level: Not on file   Occupational History    Not on file   Tobacco Use    Smoking status: Never Smoker    Smokeless tobacco: Never Used   Vaping Use    Vaping Use: Never used   Substance and Sexual Activity    Alcohol use: Never    Drug use: No    Sexual activity: Yes     Partners: Female   Other Topics Concern    Not on file   Social History Narrative    Not on file     Social Determinants of Health     Financial Resource Strain: Low Risk     Difficulty of Paying Living Expenses: Not hard at all   Food Insecurity: No Food Insecurity    Worried About Running Out of Food in the Last Year: Never true    [address] in the Last Year: Never true   Transportation Needs:     Lack of Transportation (Medical):  Lack of Transportation (Non-Medical):    Physical Activity:     Days of Exercise per Week:     Minutes of Exercise per Session:    Stress:     Feeling of Stress :    Social Connections:     Frequency of Communication with Friends and Family:     Frequency of Social Gatherings with Friends and Family:     Attends [employer] Services:     Active Member of Clubs or Organizations:     Attends Club or Organization Meetings:     Marital Status:    Intimate Partner Violence:     Fear of Current or Ex-Partner:     Emotionally Abused:     Physically Abused:     Sexually Abused:      Family History   Adopted:  Yes     Allergies   Allergen Reactions    Codeine      nausea    Morphine Other (See Comments)     Confused and disoriented    Norco [Hydrocodone-Acetaminophen] Other (See Comments)     Confusion    Singulair [Montelukast] Hives     Current Outpatient Medications on File Prior to Visit   Medication Sig Dispense Refill    propranolol (INDERAL XL) 80 MG extended release capsule Take by mouth      spironolactone (ALDACTONE) 25 MG tablet Take by mouth      ASPIRIN 81 PO Take by mouth      acetaminophen (TYLENOL) 500 MG tablet Take 500 mg by mouth every 6 hours as needed for Pain      atorvastatin (LIPITOR) 10 MG tablet Take 1 tablet by mouth daily 90 tablet 1    docusate (COLACE, DULCOLAX) 100 MG CAPS Take by mouth      primidone (MYSOLINE) 50 MG tablet Take 1 tablet by mouth 3 times daily 90 tablet 1    Cholecalciferol (VITAMIN D3) 50 MCG (2000 UT) CAPS Take 1 capsule by mouth daily 90 capsule 3    DULERA 200-5 MCG/ACT inhaler   0    Magnesium 400 MG TABS Take by mouth      albuterol sulfate HFA (PROAIR HFA) 108 (90 Base) MCG/ACT inhaler 2 puffs by Other route      ELIQUIS 5 MG TABS tablet 5 mg 2 times daily       torsemide (DEMADEX) 20 MG tablet Take 20 mg by mouth daily bid  1    NIFEdipine (ADALAT CC) 30 MG extended release tablet take 3 tablets by mouth daily      Handicap Placard MISC by Does not apply route DX: CHF (I50.9), Atrial Fibrillation (I48.0), Spinal stenosis (M48.02), Tremor (R25.1)      EXPIRES: 03/2026 1 each 0     No current facility-administered medications on file prior to visit. Review of Systems   Constitutional: Positive for activity change and fatigue. Negative for appetite change, fever and unexpected weight change. HENT: Negative. Eyes: Negative. Respiratory: Positive for shortness of breath. Negative for cough and wheezing. Cardiovascular: Positive for leg swelling. Gastrointestinal: Negative for abdominal distention, constipation, diarrhea, nausea and vomiting. Endocrine: Negative. Genitourinary: Negative. Musculoskeletal: Negative. Skin: Negative. Neurological: Positive for weakness. Negative for dizziness and syncope. Psychiatric/Behavioral: Positive for agitation, confusion, decreased concentration, dysphoric mood and hallucinations. Negative for sleep disturbance and suicidal ideas.  The patient is not nervous/anxious. Objective:   /74   Pulse 68   Resp 20   SpO2 98%    Wt Readings from Last 3 Encounters:   10/21/21 175 lb 12.8 oz (79.7 kg)   04/12/21 175 lb 12.8 oz (79.7 kg)   02/22/21 190 lb (86.2 kg)     Physical Exam  Vitals reviewed. Constitutional:       Appearance: He is ill-appearing. HENT:      Head: Normocephalic and atraumatic. Eyes:      Pupils: Pupils are equal, round, and reactive to light. Cardiovascular:      Rate and Rhythm: Normal rate. Pulmonary:      Effort: Pulmonary effort is normal.      Breath sounds: Normal breath sounds. No wheezing. Abdominal:      General: Bowel sounds are normal. There is no distension. Palpations: Abdomen is soft. Tenderness: There is no abdominal tenderness. There is no guarding. Musculoskeletal:      Cervical back: Normal range of motion. Right lower leg: Edema present. Left lower leg: Edema present. Comments: Trace BLE edema   Skin:     General: Skin is warm and dry. Neurological:      Mental Status: He is alert. Mental status is at baseline. Motor: Weakness present. Gait: Gait abnormal.      Comments: A+O x 1-2         Assessment and Plan:      1. Edema, unspecified type  2. Chronic congestive heart failure, unspecified heart failure type ([facility]  - Baseline    -Continue diuretic as prescribed  -Daily weights, call if you gain 3 lbs in one day or 5 lbs. in one week, or for increased edema, sob, cough, orthopnea, or chest pain  -Elevate BLE while in dependent position  -Avoid added salt; reviewed basic skin care.  -Compression socks on during day, off at night  -Maintain follow up with cardiology-      3. SOB (shortness of breath)  4. Mild persistent asthma without complication  - Baseline    Maintain current COPD Directed therapies  Call for increased SOB, cough, sputum production, excessive fatigue, fever, chills, or myalgias  Maintain follow up with PCP  Rinse out mouth after inhaler use.     5. Primary osteoarthritis involving multiple joints  Controlled on current regime    - traMADol (ULTRAM) 50 MG tablet; Take 1 tablet by mouth every 8 hours as needed for Pain for up to 30 days. Dispense: 90 tablet; Refill: 0    Pt is tolerating current pain meds without adverse effects or over sedation. Lowest effective dose used. Pt advised to call and notify palliative care for any adverse effects or sedation  Pt is able to maintain adequate functional level and participate in ADLs  OARRS reviewed. There is no indication of aberrant behavior  Pt advised to call for increasing or uncontrolled pain. Risk vs benefit assessed. Pt educated on risk of addiction. Pt advised to take only as prescribed and not to change frequency of pain meds without consulting palliative care first.    6. Memory loss  7. Chronic dementia with behavioral disturbance ([address]  - Due to symptom burden and agitation and anxiety with behavioral disturbances will start on seroquel. Advised on risks and benefits of such in detail. Call if any side effects or adverse reactions. - QUEtiapine (SEROQUEL) 25 MG tablet; Take 1/2 tab (12.5mg) in the Am and 1 tab (25 mg) in the PM  Dispense: 60 tablet; Refill: 3    8. Constipation, unspecified constipation type  - Advised to use miralax QOD to aid in BMs and abd pain status    9. Encounter for palliative care  - Call for any questions, concerns or change in condition. Much support, guidance and active listening provided. - Will transfere to home schedule due to symptom burden    Medications Discontinued During This Encounter   Medication Reason    traMADol (ULTRAM) 50 MG tablet REORDER        - Advance Care Planning   We discussed Living Will (LW), and [facility]) as well as their activation. Patient choice discussed. LW/HCPOA in place Yes; Tasked  for assistance with completing paperwork No; Code status discussed Yes; signed Yes. Code DNR/CCA.  All related questions were answered completely. - Goals of Care Discussion:  Disease process and goals of treatment were discussed in basic terms. Wilmar's goal is to optimize available comfort care measures. We discussed the palliative care philosophy in light of those goals. We discussed all care options contingent on treatment response and QOL. Much active listening, presence, and emotional support were given. Discussed with patient/surrogate: POC, Treatment risks/benefits, and alternatives. All questions were answered. Additionally, a printed copy of the CDC medications/opioid safety informational sheet was reviewed and given to patient for reference. Opioid contract signed:Yes    Due to acuity, symptomatology and high-risk medication management, I advised patient to No follow-ups on file. Thanks for the opportunity you have allowed us to provide palliative care to [facility]. We will be in touch as care progresses. Please feel free to reach out to us should you have any questions or requests.     Total Time 45 mins (Adv. Care planning 17 mins)   > 50% Time Spent Counseling/Care coordination yes       CHANELLE Bates - CNP    Collaborating physician: Dr. Loly Zuñiga

## 2021-11-19 ENCOUNTER — TELEPHONE (OUTPATIENT)
Dept: PALLATIVE CARE | Age: 81
End: 2021-11-19

## 2021-11-19 DIAGNOSIS — Z51.5 ENCOUNTER FOR PALLIATIVE CARE: ICD-10-CM

## 2021-11-19 DIAGNOSIS — F03.918 CHRONIC DEMENTIA WITH BEHAVIORAL DISTURBANCE: ICD-10-CM

## 2021-11-19 RX ORDER — QUETIAPINE FUMARATE 25 MG/1
TABLET, FILM COATED ORAL
Qty: 90 TABLET | Refills: 3 | Status: SHIPPED | OUTPATIENT
Start: 2021-11-19 | End: 2022-01-18 | Stop reason: SDUPTHER

## 2021-11-19 NOTE — TELEPHONE ENCOUNTER
Patient Education     Eating Out: Tips for Making Healthy Choices    It’s not easy to change the habits of a lifetime. Experts say that it can take 6 months just to change one old habit for a healthier one. That’s why gradual change is so important. These tips are reminders of the dozens of small ways you can change your habits when eating out. Don’t expect to follow each tip all the time. Think of the tips as options for handling situations that may have given you trouble in the past.  Fast food tips  · Choose grilled chicken sandwiches and plain hamburgers. Add vegetables to your burgers and sandwiches and go lightly on creamy sauces like mayonnaise and tartar sauce. Choose whole-grain buns or bread when available.  · Remove the skin from fried chicken and choose mashed potatoes, corn on the cob, or baked beans on the side.  · Order a broiled or grilled chicken salad and pile on fresh vegetables from the salad bar. Use a small amount of low-fat dressing.  · Top a baked potato with cottage cheese and vegetables from the salad bar.  · Ask for a pizza topped with vegetables.  · Watch portion size. Order regular size meals, not super-sized. A kid's meal may be enough and may have healthier options for sides.  · Stay away from milkshakes, sugary sodas, and sweetened drinks. Instead choose low-fat or skim milk, water, unsweetened ice tea, and sparkling water.  Restaurant tips  · Plan ahead. Decide what you will eat before you get to the restaurant. If you plan to eat high-fat foods, choose low-fat foods during the rest of the day.  · Don’t be afraid to ask how foods are prepared and whether they can be done without high-fat ingredients, such as cream, butter, cheese, and oil.  · Ask for sauces and salad dressings on the side and use just a tablespoon. Ask for low-fat dressings.  · Try starting your meal with a bowl of vegetable soup or a salad. This may help to prevent you from overeating during the meal.  · Order meat,  Pt's wife called, states that some medication was prescribed for the patient on 11/3/2021, she is unsure if it is helping. States that Dariusz Riley is hallucinating. They have lived in their home for 43 years, he is telling her that she moved to a new house behind his back, and is seeing people in the home all the time. Please call pt's wife back. poultry, and fish broiled, grilled, baked, poached, or steamed. Always remove the skin from chicken.  · Choose Mexican dishes made with soft, rather than crispy tortillas. For toppings, use salsa and lettuce, rather than sour cream and cheese.  · For dessert, enjoy fruit, sorbet, or low-fat frozen yogurt. Share a rich dessert with friends.  · Make a meal out of a low-fat appetizer (such as shrimp cocktail or fresh pasta), bread, and salad as your main meal. Ask for an appetizer-size portion of an entree.  Date Last Reviewed: 6/1/2017  © 8782-6800 [employer]. [address] [place] [address], [place] [address]. All rights reserved. This information is not intended as a substitute for professional medical care. Always follow your healthcare professional's instructions.

## 2021-11-19 NOTE — TELEPHONE ENCOUNTER
Spoke with patients wife in detail. States she has not had noticeable difference since starting Seroquel. States patient remains hallucinating with increased agitation behaviors, though while this has not worsened it has not improved on medication at current dose. Advised her that I will increase seroquel to 25mg in AM and 50mg at HS. Advised to givve 50mg at [facility] tonight prior to bed. Denies SOB, increased urination, changes in urine, cough, sputum production, excessive fatigue, fever, chills, or myalgias. If EPS or increased symptomology occur present to ER.

## 2021-12-09 DIAGNOSIS — J45.30 MILD PERSISTENT ASTHMA WITHOUT COMPLICATION: Primary | ICD-10-CM

## 2021-12-09 RX ORDER — MOMETASONE FUROATE AND FORMOTEROL FUMARATE DIHYDRATE 200; 5 UG/1; UG/1
2 AEROSOL RESPIRATORY (INHALATION) 2 TIMES DAILY
Qty: 39 G | Refills: 1 | Status: SHIPPED | OUTPATIENT
Start: 2021-12-09

## 2021-12-09 RX ORDER — MOMETASONE FUROATE AND FORMOTEROL FUMARATE DIHYDRATE 200; 5 UG/1; UG/1
AEROSOL RESPIRATORY (INHALATION)
Refills: 0 | OUTPATIENT
Start: 2021-12-09

## 2021-12-09 NOTE — TELEPHONE ENCOUNTER
Rx requested:  Requested Prescriptions     Pending Prescriptions Disp Refills    DULERA 200-5 MCG/ACT inhaler  0       Last Office Visit:   11/3/2021      Last filled:      Next Visit Date:  Future Appointments   Date Time Provider Iqra Dang   12/14/2021 11:00 AM CHANELLE Dailey - [facility]

## 2021-12-09 NOTE — TELEPHONE ENCOUNTER
Patient requesting medication refill.  Please approve or deny this request.    Rx requested:  Requested Prescriptions     Pending Prescriptions Disp Refills    DULERA 200-5 MCG/ACT inhaler  0         Last Office Visit:   10/21/2021      Next Visit Date:  Future Appointments   Date Time Provider Iqra Dang   12/14/2021 11:00 AM CHANELLE Tijerina CNP [facility] EMERGENCY [facility] [place]

## 2021-12-14 ENCOUNTER — OFFICE VISIT (OUTPATIENT)
Dept: PALLATIVE CARE | Age: 81
End: 2021-12-14
Payer: MEDICARE

## 2021-12-14 VITALS
SYSTOLIC BLOOD PRESSURE: 138 MMHG | RESPIRATION RATE: 20 BRPM | HEART RATE: 58 BPM | OXYGEN SATURATION: 95 % | DIASTOLIC BLOOD PRESSURE: 61 MMHG

## 2021-12-14 DIAGNOSIS — F03.918 CHRONIC DEMENTIA WITH BEHAVIORAL DISTURBANCE: Primary | ICD-10-CM

## 2021-12-14 DIAGNOSIS — Z51.5 ENCOUNTER FOR PALLIATIVE CARE: ICD-10-CM

## 2021-12-14 DIAGNOSIS — K59.00 CONSTIPATION, UNSPECIFIED CONSTIPATION TYPE: ICD-10-CM

## 2021-12-14 DIAGNOSIS — R06.02 SOB (SHORTNESS OF BREATH): ICD-10-CM

## 2021-12-14 DIAGNOSIS — R60.9 EDEMA, UNSPECIFIED TYPE: ICD-10-CM

## 2021-12-14 DIAGNOSIS — J45.30 MILD PERSISTENT ASTHMA WITHOUT COMPLICATION: ICD-10-CM

## 2021-12-14 DIAGNOSIS — M15.9 PRIMARY OSTEOARTHRITIS INVOLVING MULTIPLE JOINTS: ICD-10-CM

## 2021-12-14 DIAGNOSIS — R41.3 MEMORY LOSS: ICD-10-CM

## 2021-12-14 DIAGNOSIS — I50.9 CHRONIC CONGESTIVE HEART FAILURE, UNSPECIFIED HEART FAILURE TYPE (HCC): ICD-10-CM

## 2021-12-14 PROCEDURE — 99348 HOME/RES VST EST LOW MDM 30: CPT | Performed by: FAMILY MEDICINE

## 2021-12-14 ASSESSMENT — ENCOUNTER SYMPTOMS
NAUSEA: 0
ABDOMINAL DISTENTION: 0
CONSTIPATION: 0
VOMITING: 0
WHEEZING: 0
SHORTNESS OF BREATH: 1
EYES NEGATIVE: 1
DIARRHEA: 0
COUGH: 0

## 2021-12-14 NOTE — PROGRESS NOTES
Diverticulosis     colonoscopy 9/2016    Diverticulosis of colon 6/6/2017    Fracture of rib of right side     Hernia     umbilical hernia repair    History of TIA (transient ischemic attack) 11/16/2017    HTN (hypertension) 8/26/2014    Hyperlipidemia     Hypertension     Iron deficiency anemia     Lymphopenia 3/15/2021    Macrocytosis 3/15/2021    Melanoma ([facility] [address]     right arm 2004    Memory loss 3/4/2014    MGUS (monoclonal gammopathy of unknown significance) 3/15/2021    Mild persistent asthma without complication 6/4/4790    Osteoarthritis     Paroxysmal atrial fibrillation ([address] 11/16/2017    Renal insufficiency     Status post total knee replacement 1/4/2012    Thoracic spine pain     chronic    TIA (transient ischemic attack) 1/16/2017    Tremor     chronic    Ulcerative colitis ([facility] [address] 1/20/2017     Past Surgical History:   Procedure Laterality Date    COLONOSCOPY  2005     [facility]    COLONOSCOPY  09/16/2016    polyp, sigmoid colitis, diverticulosis (DR ROSARIO)    HERNIA REPAIR Bilateral 7/1/2020    BILATERAL INGUINAL HERNIA REPAIR performed by Agustin Herr MD at [address] [place] [address] Left     TOTAL KNEE ARTHROPLASTY Right     TOTAL KNEE ARTHROPLASTY Left     UMBILICAL HERNIA REPAIR      VENTRAL HERNIA REPAIR  01/07/2015     Social History     Socioeconomic History    Marital status:      Spouse name: Not on file    Number of children: Not on file    Years of education: Not on file    Highest education level: Not on file   Occupational History    Not on file   Tobacco Use    Smoking status: Never Smoker    Smokeless tobacco: Never Used   Vaping Use    Vaping Use: Never used   Substance and Sexual Activity    Alcohol use: Never    Drug use: No    Sexual activity: Yes     Partners: Female   Other Topics Concern    Not on file   Social History Narrative    Not on file     Social Determinants of Health     Financial Resource Strain: Low Risk     Difficulty of Paying Living Expenses: Not hard at all   Food Insecurity: No Food Insecurity    Worried About Running Out of Food in the Last Year: Never true    Ran Out of Food in the Last Year: Never true   Transportation Needs:     Lack of Transportation (Medical): Not on file    Lack of Transportation (Non-Medical): Not on file   Physical Activity:     Days of Exercise per Week: Not on file    Minutes of Exercise per Session: Not on file   Stress:     Feeling of Stress : Not on file   Social Connections:     Frequency of Communication with Friends and Family: Not on file    Frequency of Social Gatherings with Friends and Family: Not on file    Attends Restorationist Services: Not on file    Active Member of [address] [place] [address] [employer] or Organizations: Not on file    Attends Club or Organization Meetings: Not on file    Marital Status: Not on file   Intimate Partner Violence:     Fear of Current or Ex-Partner: Not on file    Emotionally Abused: Not on file    Physically Abused: Not on file    Sexually Abused: Not on file   Housing Stability:     Unable to Pay for Housing in the Last Year: Not on file    Number of [place] in the Last Year: Not on file    Unstable Housing in the Last Year: Not on file     Family History   Adopted:  Yes     Allergies   Allergen Reactions    Codeine      nausea    Morphine Other (See Comments)     Confused and disoriented    Norco [Hydrocodone-Acetaminophen] Other (See Comments)     Confusion    Singulair [Montelukast] Hives     Current Outpatient Medications on File Prior to Visit   Medication Sig Dispense Refill    DULERA 200-5 MCG/ACT inhaler Inhale 2 puffs into the lungs 2 times daily 39 g 1    QUEtiapine (SEROQUEL) 25 MG tablet Take 1 tab (25 mg) in the Am and 2 tabs (50 mg) in the PM 90 tablet 3    propranolol (INDERAL XL) 80 MG extended release capsule Take by mouth      spironolactone (ALDACTONE) 25 MG tablet Take by mouth      ASPIRIN 81 PO Take by mouth      acetaminophen (TYLENOL) 500 MG tablet Take 500 mg by mouth every 6 hours as needed for Pain      atorvastatin (LIPITOR) 10 MG tablet Take 1 tablet by mouth daily 90 tablet 1    NIFEdipine (ADALAT CC) 30 MG extended release tablet take 3 tablets by mouth daily      docusate (COLACE, DULCOLAX) 100 MG CAPS Take by mouth      Handicap Placard MISC by Does not apply route DX: CHF (I50.9), Atrial Fibrillation (I48.0), Spinal stenosis (M48.02), Tremor (R25.1)      EXPIRES: 03/2026 1 each 0    primidone (MYSOLINE) 50 MG tablet Take 1 tablet by mouth 3 times daily 90 tablet 1    Cholecalciferol (VITAMIN D3) 50 MCG (2000 UT) CAPS Take 1 capsule by mouth daily 90 capsule 3    Magnesium 400 MG TABS Take by mouth      albuterol sulfate HFA (PROAIR HFA) 108 (90 Base) MCG/ACT inhaler 2 puffs by Other route      ELIQUIS 5 MG TABS tablet 5 mg 2 times daily       torsemide (DEMADEX) 20 MG tablet Take 20 mg by mouth daily bid  1     No current facility-administered medications on file prior to visit. Review of Systems   Constitutional: Positive for activity change and fatigue. Negative for appetite change, fever and unexpected weight change. HENT: Negative. Eyes: Negative. Respiratory: Positive for shortness of breath. Negative for cough and wheezing. Cardiovascular: Positive for leg swelling. Gastrointestinal: Negative for abdominal distention, constipation, diarrhea, nausea and vomiting. Endocrine: Negative. Genitourinary: Negative. Musculoskeletal: Negative. Skin: Negative. Neurological: Positive for weakness. Negative for dizziness and syncope. Psychiatric/Behavioral: Positive for confusion, decreased concentration and dysphoric mood. Negative for agitation, sleep disturbance and suicidal ideas. The patient is not nervous/anxious.             Objective:   /61   Pulse 58   Resp 20   SpO2 95%    Wt Readings from Last 3 Encounters:   10/21/21 175 lb 12.8 oz (79.7 kg)   04/12/21 175 lb 12.8 oz (79.7 kg)   02/22/21 190 lb (86.2 kg)     Physical Exam  Vitals reviewed. Constitutional:       Appearance: He is ill-appearing. HENT:      Head: Normocephalic and atraumatic. Eyes:      Pupils: Pupils are equal, round, and reactive to light. Cardiovascular:      Rate and Rhythm: Normal rate. Pulmonary:      Effort: Pulmonary effort is normal.      Breath sounds: Normal breath sounds. No wheezing. Abdominal:      General: Bowel sounds are normal. There is no distension. Palpations: Abdomen is soft. Tenderness: There is no abdominal tenderness. There is no guarding. Musculoskeletal:      Cervical back: Normal range of motion. Right lower leg: Edema present. Left lower leg: Edema present. Comments: Trace BLE edema   Skin:     General: Skin is warm and dry. Neurological:      Mental Status: He is alert. Mental status is at baseline. Motor: Weakness present. Gait: Gait abnormal.      Comments: A+O x 2-3         Assessment and Plan:      1. Edema, unspecified type  2. Chronic congestive heart failure, unspecified heart failure type ([facility] [address]  - Baseline    -Continue diuretic as prescribed  -Daily weights, call if you gain 3 lbs in one day or 5 lbs. in one week, or for increased edema, sob, cough, orthopnea, or chest pain  -Elevate BLE while in dependent position  -Avoid added salt; reviewed basic skin care.  -Compression socks on during day, off at night  -Maintain follow up with cardiology-      3. SOB (shortness of breath)  4. Mild persistent asthma without complication  - Baseline    Maintain current COPD Directed therapies  Call for increased SOB, cough, sputum production, excessive fatigue, fever, chills, or myalgias  Maintain follow up with PCP  Rinse out mouth after inhaler use.     5. Primary osteoarthritis involving multiple joints  Controlled on current regime of ultram. Uses scantly    Pt is tolerating current pain meds without adverse effects or over sedation. Lowest effective dose used. Pt advised to call and notify palliative care for any adverse effects or sedation  Pt is able to maintain adequate functional level and participate in ADLs  OARRS reviewed. There is no indication of aberrant behavior  Pt advised to call for increasing or uncontrolled pain. Risk vs benefit assessed. Pt educated on risk of addiction. Pt advised to take only as prescribed and not to change frequency of pain meds without consulting palliative care first.    6. Memory loss  7. Chronic dementia with behavioral disturbance (HCC)  - Improved on seroquel. Would likely benefit from mid day dose though will wait as having scantly increased fatigue until medication related fatigue normaizes    8. Constipation, unspecified constipation type  - Controlled on current regime    9. Encounter for palliative care  - As with prior as patient has suspected cancer though family does not wish to pursue treatment they are aware he is hospice eligable though wish to juan carlos tat this time. Call for any questions, concerns or change in condition. Much support, guidance and active listening provided. There are no discontinued medications. Due to acuity, symptomatology and high-risk medication management, I advised patient to Return in about 1 month (around 1/14/2022), or if symptoms worsen or fail to improve.        Total Time 25 Mins   > 50% Time Spent Counseling/Care coordination yes       CHANELLE Mitchell - CNP    Collaborating physician: Dr. Jeanna Davidson

## 2021-12-29 DIAGNOSIS — R53.81 DECLINING FUNCTIONAL STATUS: ICD-10-CM

## 2021-12-29 DIAGNOSIS — R41.3 MEMORY LOSS: Primary | ICD-10-CM

## 2021-12-29 DIAGNOSIS — I50.9 CHRONIC CONGESTIVE HEART FAILURE, UNSPECIFIED HEART FAILURE TYPE (HCC): ICD-10-CM

## 2021-12-29 DIAGNOSIS — D47.2 MGUS (MONOCLONAL GAMMOPATHY OF UNKNOWN SIGNIFICANCE): ICD-10-CM

## 2022-01-04 ENCOUNTER — TELEPHONE (OUTPATIENT)
Dept: FAMILY MEDICINE CLINIC | Age: 82
End: 2022-01-04

## 2022-01-04 DIAGNOSIS — D47.2 MGUS (MONOCLONAL GAMMOPATHY OF UNKNOWN SIGNIFICANCE): ICD-10-CM

## 2022-01-04 DIAGNOSIS — R53.81 DECLINING FUNCTIONAL STATUS: ICD-10-CM

## 2022-01-04 DIAGNOSIS — R41.3 MEMORY LOSS: Primary | ICD-10-CM

## 2022-01-04 DIAGNOSIS — I50.9 CHRONIC CONGESTIVE HEART FAILURE, UNSPECIFIED HEART FAILURE TYPE (HCC): ICD-10-CM

## 2022-01-04 NOTE — TELEPHONE ENCOUNTER
Patient's wife stopped by, she stated that [facility] has not called her to schedule. She states they would like to go with [facility], and are planning to be at patient's home today at 2pm. She is requesting a new referral to be placed, and I will fax to [facility] @ [phone]. Please advise.

## 2022-01-18 ENCOUNTER — OFFICE VISIT (OUTPATIENT)
Dept: PALLATIVE CARE | Age: 82
End: 2022-01-18
Payer: MEDICARE

## 2022-01-18 VITALS
WEIGHT: 174 LBS | HEART RATE: 92 BPM | OXYGEN SATURATION: 97 % | SYSTOLIC BLOOD PRESSURE: 106 MMHG | RESPIRATION RATE: 20 BRPM | DIASTOLIC BLOOD PRESSURE: 62 MMHG | BODY MASS INDEX: 27.25 KG/M2

## 2022-01-18 DIAGNOSIS — R68.89 SUSPECTED MALIGNANT NEOPLASM OF LUNG: ICD-10-CM

## 2022-01-18 DIAGNOSIS — R09.02 HYPOXIA: Primary | ICD-10-CM

## 2022-01-18 DIAGNOSIS — Z51.5 ENCOUNTER FOR PALLIATIVE CARE: ICD-10-CM

## 2022-01-18 DIAGNOSIS — G47.00 INSOMNIA, UNSPECIFIED TYPE: ICD-10-CM

## 2022-01-18 DIAGNOSIS — M15.9 PRIMARY OSTEOARTHRITIS INVOLVING MULTIPLE JOINTS: ICD-10-CM

## 2022-01-18 DIAGNOSIS — R06.02 SOB (SHORTNESS OF BREATH): ICD-10-CM

## 2022-01-18 DIAGNOSIS — J45.30 MILD PERSISTENT ASTHMA WITHOUT COMPLICATION: ICD-10-CM

## 2022-01-18 DIAGNOSIS — I50.9 CHRONIC CONGESTIVE HEART FAILURE, UNSPECIFIED HEART FAILURE TYPE (HCC): ICD-10-CM

## 2022-01-18 DIAGNOSIS — F03.918 CHRONIC DEMENTIA WITH BEHAVIORAL DISTURBANCE: ICD-10-CM

## 2022-01-18 DIAGNOSIS — R91.1 LUNG NODULE SEEN ON IMAGING STUDY: ICD-10-CM

## 2022-01-18 DIAGNOSIS — K59.00 CONSTIPATION, UNSPECIFIED CONSTIPATION TYPE: ICD-10-CM

## 2022-01-18 DIAGNOSIS — R60.9 EDEMA, UNSPECIFIED TYPE: ICD-10-CM

## 2022-01-18 DIAGNOSIS — R41.3 MEMORY LOSS: ICD-10-CM

## 2022-01-18 PROCEDURE — 99497 ADVNCD CARE PLAN 30 MIN: CPT | Performed by: FAMILY MEDICINE

## 2022-01-18 PROCEDURE — 99349 HOME/RES VST EST MOD MDM 40: CPT | Performed by: FAMILY MEDICINE

## 2022-01-18 RX ORDER — QUETIAPINE FUMARATE 25 MG/1
TABLET, FILM COATED ORAL
Qty: 120 TABLET | Refills: 5 | Status: SHIPPED | OUTPATIENT
Start: 2022-01-18

## 2022-01-18 RX ORDER — PREDNISONE 10 MG/1
TABLET ORAL
Qty: 30 TABLET | Refills: 0 | Status: SHIPPED | OUTPATIENT
Start: 2022-01-18

## 2022-01-18 RX ORDER — CHOLECALCIFEROL (VITAMIN D3) 125 MCG
5 CAPSULE ORAL NIGHTLY
Qty: 90 TABLET | Refills: 3 | Status: SHIPPED | OUTPATIENT
Start: 2022-01-18

## 2022-01-18 RX ORDER — DOXYCYCLINE HYCLATE 100 MG/1
100 CAPSULE ORAL 2 TIMES DAILY
Qty: 20 CAPSULE | Refills: 0 | Status: SHIPPED | OUTPATIENT
Start: 2022-01-18 | End: 2022-01-28

## 2022-01-18 ASSESSMENT — ENCOUNTER SYMPTOMS
DIARRHEA: 0
SHORTNESS OF BREATH: 1
WHEEZING: 1
VOMITING: 0
COUGH: 0
CONSTIPATION: 0
ABDOMINAL DISTENTION: 0
EYES NEGATIVE: 1
NAUSEA: 0

## 2022-01-18 NOTE — PROGRESS NOTES
Subjective:      Patient Id: Seen Carmen Arnold at home for symptom management. He was accompanied to the appointment by: his wife and daughter. Chief Complaint   Patient presents with    Pain    Shortness of Breath    Other    Follow-up           Rboert Valencia is a 78 y.o. male seen for symptom management. Carmen Arnold has complex medical history that includes CHF, Mild persistant asthma, OA, and chronic dementia with behavioral disturbances. Home visit is necessary in lieu of office due to significant frailty and high symptom burden from comorbid illnesses. Patient is minimally talkative at visit. Per family agitation has decreased on seroquel and fatigue now at baseline. Remains having anxiety, agitation, and hallucinations though reduced in to minimal frequency. States pain is well controlled on current regime. States it occurs in his bilateral shoulders. Describes pain as an intermittent ache. Currently taking tylenol PRN and ultram when pain severe. Denies Sedation, constipation, or other adverse effects on current regime. Constipation controlled on miralax when needed. Appetite much improved and is well. Ambulation and strength at baseline. Has been having increased SOB with audible wheezing x 4-5 days. Sometimes misses dulera inhaller. Hx of PET scan on 7/19/21 at [facility] noting right lower and left upper lobe nodules with uptake suspicious for neoplasm though declined biopsy and further treatement. Per daughter BP was /104 prior to medications yesterday morning. /62 and Pox 97 at rest though noted desat to 78 with minimal activity. Lastly patient often waking at HS and unable to maintain sleep.      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    CAD (coronary artery disease)     status post stents in 2006 and 2008    Cervical stenosis of spinal canal     status post laminectomies Determinants of Health     Financial Resource Strain: Low Risk     Difficulty of Paying Living Expenses: Not hard at all   Food Insecurity: No Food Insecurity    Worried About Running Out of Food in the Last Year: Never true    Iva of Food in the Last Year: Never true   Transportation Needs:     Lack of Transportation (Medical): Not on file    Lack of Transportation (Non-Medical): Not on file   Physical Activity:     Days of Exercise per Week: Not on file    Minutes of Exercise per Session: Not on file   Stress:     Feeling of Stress : Not on file   Social Connections:     Frequency of Communication with Friends and Family: Not on file    Frequency of Social Gatherings with Friends and Family: Not on file    Attends Ep[employer] Services: Not on file    Active Member of [address] [place] [address] or Organizations: Not on file    Attends Club or Organization Meetings: Not on file    Marital Status: Not on file   Intimate Partner Violence:     Fear of Current or Ex-Partner: Not on file    Emotionally Abused: Not on file    Physically Abused: Not on file    Sexually Abused: Not on file   Housing Stability:     Unable to Pay for Housing in the Last Year: Not on file    Number of [place] in the Last Year: Not on file    Unstable Housing in the Last Year: Not on file     Family History   Adopted:  Yes     Allergies   Allergen Reactions    Codeine      nausea    Morphine Other (See Comments)     Confused and disoriented    Norco [Hydrocodone-Acetaminophen] Other (See Comments)     Confusion    Singulair [Montelukast] Hives     Current Outpatient Medications on File Prior to Visit   Medication Sig Dispense Refill    DULERA 200-5 MCG/ACT inhaler Inhale 2 puffs into the lungs 2 times daily 39 g 1    propranolol (INDERAL XL) 80 MG extended release capsule Take by mouth      spironolactone (ALDACTONE) 25 MG tablet Take by mouth      ASPIRIN 81 PO Take by mouth      acetaminophen (TYLENOL) 500 MG tablet Take 500 mg by mouth every 6 hours as needed for Pain      atorvastatin (LIPITOR) 10 MG tablet Take 1 tablet by mouth daily 90 tablet 1    NIFEdipine (ADALAT CC) 30 MG extended release tablet take 3 tablets by mouth daily      docusate (COLACE, DULCOLAX) 100 MG CAPS Take by mouth      Handicap Placard MISC by Does not apply route DX: CHF (I50.9), Atrial Fibrillation (I48.0), Spinal stenosis (M48.02), Tremor (R25.1)      EXPIRES: 03/2026 1 each 0    primidone (MYSOLINE) 50 MG tablet Take 1 tablet by mouth 3 times daily 90 tablet 1    Cholecalciferol (VITAMIN D3) 50 MCG (2000 UT) CAPS Take 1 capsule by mouth daily 90 capsule 3    Magnesium 400 MG TABS Take by mouth      albuterol sulfate HFA (PROAIR HFA) 108 (90 Base) MCG/ACT inhaler 2 puffs by Other route      ELIQUIS 5 MG TABS tablet 5 mg 2 times daily       torsemide (DEMADEX) 20 MG tablet Take 20 mg by mouth daily bid  1     No current facility-administered medications on file prior to visit. Review of Systems   Constitutional: Positive for activity change and fatigue. Negative for appetite change, fever and unexpected weight change. HENT: Negative. Eyes: Negative. Respiratory: Positive for shortness of breath and wheezing. Negative for cough. Cardiovascular: Positive for leg swelling. Gastrointestinal: Negative for abdominal distention, constipation, diarrhea, nausea and vomiting. Endocrine: Negative. Genitourinary: Negative. Musculoskeletal: Negative. Skin: Negative. Neurological: Positive for weakness. Negative for dizziness and syncope. Psychiatric/Behavioral: Positive for confusion, decreased concentration and dysphoric mood. Negative for agitation, sleep disturbance and suicidal ideas. The patient is not nervous/anxious.             Objective:   /62   Pulse 92   Resp 20   Wt 174 lb (78.9 kg)   SpO2 97%   BMI 27.25 kg/m²    Wt Readings from Last 3 Encounters:   01/18/22 174 lb (78.9 kg)   10/21/21 175 lb 12.8 oz (79.7 kg)   04/12/21 175 lb 12.8 oz (79.7 kg)     Physical Exam  Vitals reviewed. Constitutional:       Appearance: He is ill-appearing. HENT:      Head: Normocephalic and atraumatic. Eyes:      Pupils: Pupils are equal, round, and reactive to light. Cardiovascular:      Rate and Rhythm: Normal rate. Pulmonary:      Effort: Pulmonary effort is normal.      Breath sounds: Wheezing present. Comments: insp and exp wheezing noted  Abdominal:      General: Bowel sounds are normal. There is no distension. Palpations: Abdomen is soft. Tenderness: There is no abdominal tenderness. There is no guarding. Musculoskeletal:      Cervical back: Normal range of motion. Right lower leg: Edema present. Left lower leg: Edema present. Comments: Trace BLE edema   Skin:     General: Skin is warm and dry. Neurological:      Mental Status: He is alert. Mental status is at baseline. Motor: Weakness present. Gait: Gait abnormal.      Comments: A+O x 2-3         Assessment and Plan:      1. Edema, unspecified type  2. Chronic congestive heart failure, unspecified heart failure type ([address]  Baseline    -Continue diuretic as prescribed  -Daily weights, call if you gain 3 lbs in one day or 5 lbs. in one week, or for increased edema, sob, cough, orthopnea, or chest pain  -Elevate BLE while in dependent position  -Avoid added salt; reviewed basic skin care.  -Compression socks on during day, off at night  -Maintain follow up with cardiology-      3. SOB (shortness of breath)  4. Hypoxia  5. Mild persistent asthma without complication  6. Lung nodule seen on imaging study  7. Suspected malignant neoplasm of lung  - As O2 sat decreases to 78% and patient has suspected lung ca though opting for no further treatment will order home O2. Related to harsh insp and exp wheezing will start COPD ER pack    - doxycycline hyclate (VIBRAMYCIN) 100 MG capsule;  Take 1 capsule by mouth 2 times daily for 10 days  Dispense: 20 capsule; Refill: 0  - predniSONE (DELTASONE) 10 MG tablet; Take 4 tabs x 3 days then 3 tabs x 3 days then 2 tabs x 3 days then 1 tab x 3 days. Dispense: 30 tablet; Refill: 0  - DME for home oxygen    8. Primary osteoarthritis involving multiple joints  Controlled on current regime    9. Memory loss  - Exacerbated with recent illness    10. Chronic dementia with behavioral disturbance ([facility] [address]  - Will addd evening dose to medication    - QUEtiapine (SEROQUEL) 25 MG tablet; Take 1 tab (25 mg) in the Am 1 tab (25mg) around noon and 2 tabs (50 mg) in the PM  Dispense: 120 tablet; Refill: 5    11. Constipation, unspecified constipation type  Controlled on current regime    12. Insomnia, unspecified type    - melatonin 5 MG TABS tablet; Take 1 tablet by mouth nightly  Dispense: 90 tablet; Refill: 3    13. Encounter for palliative care  - Call for any questions, concerns or change in condition. Much support, guidance and active listening provided. Medications Discontinued During This Encounter   Medication Reason    QUEtiapine (SEROQUEL) 25 MG tablet REORDER       Due to acuity, symptomatology and high-risk medication management, I advised patient to Return in about 1 month (around 2/18/2022), or if symptoms worsen or fail to improve.        Total Time 40 Mins   > 50% Time Spent Counseling/Care coordination yes       CHANELLE Bhagat - CNP    Collaborating physician: Dr. Samuel Vicente

## 2022-01-24 ENCOUNTER — TELEPHONE (OUTPATIENT)
Dept: PALLATIVE CARE | Age: 82
End: 2022-01-24

## 2022-01-24 NOTE — TELEPHONE ENCOUNTER
Pt's wife called today stated that the prescribed Seroquel is not covered, can this medication be changed? Please call Pt's wife with questions.

## 2022-01-24 NOTE — TELEPHONE ENCOUNTER
Sidra Grimm could you please call patients wife. She states that pharmacy covered medication though for only 30 days and she received letter for possiable exemption to be filed. I would like to continue and file exemption for them to keep seroquel as changing may result in mood swings. Wife has letter/paperwork to go over with you regarding such if you need it.

## 2022-01-25 ENCOUNTER — TELEPHONE (OUTPATIENT)
Dept: FAMILY MEDICINE CLINIC | Age: 82
End: 2022-01-25

## 2022-01-25 ENCOUNTER — CLINICAL DOCUMENTATION (OUTPATIENT)
Dept: PALLATIVE CARE | Age: 82
End: 2022-01-25

## 2022-01-25 ENCOUNTER — FOLLOWUP TELEPHONE ENCOUNTER (OUTPATIENT)
Dept: PALLATIVE CARE | Age: 82
End: 2022-01-25

## 2022-01-25 DIAGNOSIS — I50.9 CHRONIC CONGESTIVE HEART FAILURE, UNSPECIFIED HEART FAILURE TYPE (HCC): ICD-10-CM

## 2022-01-25 DIAGNOSIS — I25.10 CORONARY ARTERY DISEASE INVOLVING NATIVE CORONARY ARTERY OF NATIVE HEART WITHOUT ANGINA PECTORIS: Primary | ICD-10-CM

## 2022-01-25 NOTE — TELEPHONE ENCOUNTER
Patient was referred to [facility] in December, but they determined patient was not appropriate for services. Per Edgardo Patel @ [facility], the director believes he would be appropriate for services there, and are requesting a new referral for [facility]. Please advise.

## 2022-01-25 NOTE — TELEPHONE ENCOUNTER
Spoke with pt's wife, sounds like medication just needs a PA when the next refill is due.  Pt's wife states she has a lot of this medication and will call earlier than normal for a refill so a PA can be completed without the patient running out of medication

## 2022-01-25 NOTE — PROGRESS NOTES
Reviewed case with Dr Mary Flores this AM and deemed appropriate for hospice services. Spoke with wife and she is amicable to speak with hospice given patients increased oxygenation needs and further weakness. Sent message regarding such to hospice intake nurse. Discussed goals of care with patient. Explained in extensive detail nuances between full code, DNR CCA and DNR CC.  Decision to remain [facility]    Advanced care planning > 17 mins

## 2022-01-25 NOTE — PROGRESS NOTES
Reviewed case with Dr Emiliano Schulte this AM and deemed appropriate for hospice services. Spoke with wife and she is amicable to speak with hospice given patients increased oxygenation needs and further weakness. Sent message regarding such to hospice intake nurse.

## 2022-05-08 NOTE — ASSESSMENT & PLAN NOTE
Blood pressure improved with restarting hydralazine at increased dose since most recent visit. Patient has follow-up in place with cardiology office and has a visit scheduled with nephrology to discuss potential further management as well. Patient and wife were instructed to go to the emergency room for any elevated blood pressure readings associated with chest pain, dyspnea, palpitations, worst headache of life, vision changes, acute onset muscle weakness or change in mental status.
Established with hematology/oncology for long-term follow-up and management
Stable. Currently asymptomatic and historically well controlled. He remains on anticoagulation without signs of bleeding. Continue current management.
Stable. No reported worsening lower extremity edema, orthopnea, or PND. We will continue to monitor symptomatically over time.
Stable. Patient established with hematology/oncology for long-term lab work monitoring and management.
No

## 2022-10-28 PROBLEM — Z51.5 HOSPICE CARE: Status: ACTIVE | Noted: 2022-10-28

## (undated) DEVICE — BLADE CLIPPER GEN PURP NS

## (undated) DEVICE — DRAIN SURG W0.5XL18IN FLAT GRAV FOR OPN WND PENROSE

## (undated) DEVICE — SUTURE VCRL SZ 2-0 L36IN ABSRB UD L36MM CT-1 1/2 CIR J945H

## (undated) DEVICE — SUTURE VCRL SZ 3-0 L27IN ABSRB UD L26MM SH 1/2 CIR J416H

## (undated) DEVICE — 2000CC GUARDIAN II: Brand: GUARDIAN

## (undated) DEVICE — CHLORAPREP 26ML ORANGE

## (undated) DEVICE — SUTURE VCRL SZ 2-0 L36IN ABSRB UD L40MM CT 1/2 CIR J957H

## (undated) DEVICE — BLADE ES ELASTOMERIC COAT INSUL DURABLE BEND UPTO 90DEG

## (undated) DEVICE — KITTNER SPONGE: Brand: DEROYAL

## (undated) DEVICE — SUTURE MCRYL SZ 4-0 L27IN ABSRB UD L19MM PS-2 1/2 CIR PRIM Y426H

## (undated) DEVICE — GARMENT REPROCESS CALF FLOWTRON

## (undated) DEVICE — INTENDED FOR TISSUE SEPARATION, AND OTHER PROCEDURES THAT REQUIRE A SHARP SURGICAL BLADE TO PUNCTURE OR CUT.: Brand: BARD-PARKER ®  SAFETY SCALPED

## (undated) DEVICE — MINOR: Brand: MEDLINE INDUSTRIES, INC.

## (undated) DEVICE — GLOVE SURG SZ 8 L11.77IN FNGR THK9.8MIL STRW LTX POLYMER

## (undated) DEVICE — SYR 10ML L/L CONTROL: Brand: MEDLINE INDUSTRIES, INC.

## (undated) DEVICE — COVER LT HNDL BLU PLAS

## (undated) DEVICE — 4-PORT MANIFOLD: Brand: NEPTUNE 2

## (undated) DEVICE — Z DISCONTINUED PER MEDLINE USE 2741942 DRESSING AQUACEL 6 IN ALG W9XL15CM SIL CVR WTRPRF VIR BACT BARR ANTIMIC

## (undated) DEVICE — NEEDLE HYPO 25GA L1.5IN BVL ORIENTED ECLIPSE